# Patient Record
Sex: FEMALE | Race: WHITE | NOT HISPANIC OR LATINO
[De-identification: names, ages, dates, MRNs, and addresses within clinical notes are randomized per-mention and may not be internally consistent; named-entity substitution may affect disease eponyms.]

---

## 2017-06-22 ENCOUNTER — APPOINTMENT (OUTPATIENT)
Dept: OTOLARYNGOLOGY | Facility: CLINIC | Age: 57
End: 2017-06-22

## 2017-06-22 VITALS
DIASTOLIC BLOOD PRESSURE: 93 MMHG | SYSTOLIC BLOOD PRESSURE: 124 MMHG | HEIGHT: 59 IN | BODY MASS INDEX: 24.19 KG/M2 | WEIGHT: 120 LBS | HEART RATE: 91 BPM

## 2017-06-22 DIAGNOSIS — J31.0 CHRONIC RHINITIS: ICD-10-CM

## 2017-06-22 DIAGNOSIS — H90.71 MIXED CONDUCTIVE AND SENSORINEURAL HEARING LOSS, UNILATERAL, RIGHT EAR, WITH UNRESTRICTED HEARING ON THE CONTRALATERAL SIDE: ICD-10-CM

## 2017-06-22 DIAGNOSIS — Z82.2 FAMILY HISTORY OF DEAFNESS AND HEARING LOSS: ICD-10-CM

## 2017-06-22 DIAGNOSIS — J34.2 DEVIATED NASAL SEPTUM: ICD-10-CM

## 2017-06-22 DIAGNOSIS — Z86.39 PERSONAL HISTORY OF OTHER ENDOCRINE, NUTRITIONAL AND METABOLIC DISEASE: ICD-10-CM

## 2017-06-22 DIAGNOSIS — H60.509 UNSPECIFIED ACUTE NONINFECTIVE OTITIS EXTERNA, UNSPECIFIED EAR: ICD-10-CM

## 2017-06-22 DIAGNOSIS — H61.23 IMPACTED CERUMEN, BILATERAL: ICD-10-CM

## 2017-06-22 DIAGNOSIS — H65.31 CHRONIC MUCOID OTITIS MEDIA, RIGHT EAR: ICD-10-CM

## 2017-06-22 RX ORDER — METHYLPREDNISOLONE 4 MG/1
4 TABLET ORAL
Qty: 90 | Refills: 0 | Status: ACTIVE | COMMUNITY
Start: 2017-03-21

## 2017-06-22 RX ORDER — URSODIOL 300 MG/1
300 CAPSULE ORAL
Qty: 180 | Refills: 0 | Status: ACTIVE | COMMUNITY
Start: 2017-01-24

## 2017-06-22 RX ORDER — LAMOTRIGINE 100 MG/1
100 TABLET ORAL
Qty: 90 | Refills: 0 | Status: ACTIVE | COMMUNITY
Start: 2017-05-17

## 2017-06-22 RX ORDER — HYDROCODONE BITARTRATE AND ACETAMINOPHEN 5; 325 MG/1; MG/1
5-325 TABLET ORAL
Qty: 12 | Refills: 0 | Status: ACTIVE | COMMUNITY
Start: 2017-02-06

## 2017-06-22 RX ORDER — METHYLPREDNISOLONE 4 MG/1
4 TABLET ORAL
Qty: 1 | Refills: 0 | Status: ACTIVE | COMMUNITY
Start: 2017-06-22 | End: 1900-01-01

## 2017-06-22 RX ORDER — PANTOPRAZOLE 40 MG/1
40 TABLET, DELAYED RELEASE ORAL
Qty: 30 | Refills: 0 | Status: ACTIVE | COMMUNITY
Start: 2017-05-23

## 2017-06-22 RX ORDER — HYDROXYCHLOROQUINE SULFATE 200 MG/1
200 TABLET, FILM COATED ORAL
Qty: 180 | Refills: 0 | Status: ACTIVE | COMMUNITY
Start: 2017-02-03

## 2017-06-22 RX ORDER — DIAZEPAM 5 MG/1
5 TABLET ORAL
Qty: 10 | Refills: 0 | Status: ACTIVE | COMMUNITY
Start: 2017-02-21

## 2017-06-22 RX ORDER — CHLORHEXIDINE GLUCONATE, 0.12% ORAL RINSE 1.2 MG/ML
0.12 SOLUTION DENTAL
Qty: 480 | Refills: 0 | Status: ACTIVE | COMMUNITY
Start: 2017-02-06

## 2017-06-22 RX ORDER — PREDNISOLONE ACETATE 10 MG/ML
1 SUSPENSION/ DROPS OPHTHALMIC
Qty: 10 | Refills: 0 | Status: ACTIVE | COMMUNITY
Start: 2017-05-17

## 2017-06-22 RX ORDER — NITROGLYCERIN 20 MG/G
2 OINTMENT TOPICAL
Qty: 48 | Refills: 0 | Status: ACTIVE | COMMUNITY
Start: 2017-03-13

## 2017-06-22 RX ORDER — DILTIAZEM HYDROCHLORIDE 120 MG/1
120 CAPSULE, EXTENDED RELEASE ORAL
Qty: 30 | Refills: 0 | Status: ACTIVE | COMMUNITY
Start: 2017-05-08

## 2017-06-22 RX ORDER — PREDNISONE 1 MG/1
1 TABLET ORAL
Qty: 120 | Refills: 0 | Status: ACTIVE | COMMUNITY
Start: 2017-06-15

## 2017-06-22 RX ORDER — METOCLOPRAMIDE 10 MG/1
10 TABLET ORAL
Qty: 30 | Refills: 0 | Status: ACTIVE | COMMUNITY
Start: 2017-05-23

## 2017-06-22 RX ORDER — ACETAMINOPHEN AND CODEINE 300; 30 MG/1; MG/1
300-30 TABLET ORAL
Qty: 16 | Refills: 0 | Status: ACTIVE | COMMUNITY
Start: 2017-03-06

## 2017-06-22 RX ORDER — METHYLPREDNISOLONE 4 MG/1
4 TABLET ORAL
Qty: 21 | Refills: 0 | Status: ACTIVE | COMMUNITY
Start: 2016-12-20

## 2017-06-22 RX ORDER — HYDROMORPHONE HYDROCHLORIDE 4 MG/1
4 TABLET ORAL
Qty: 15 | Refills: 0 | Status: ACTIVE | COMMUNITY
Start: 2017-03-02

## 2017-07-05 ENCOUNTER — APPOINTMENT (OUTPATIENT)
Dept: OTOLARYNGOLOGY | Facility: CLINIC | Age: 57
End: 2017-07-05

## 2017-07-21 ENCOUNTER — APPOINTMENT (OUTPATIENT)
Dept: OTOLARYNGOLOGY | Facility: CLINIC | Age: 57
End: 2017-07-21

## 2017-08-22 ENCOUNTER — APPOINTMENT (OUTPATIENT)
Dept: ORTHOPEDIC SURGERY | Facility: CLINIC | Age: 57
End: 2017-08-22

## 2017-08-31 ENCOUNTER — APPOINTMENT (OUTPATIENT)
Dept: OTOLARYNGOLOGY | Facility: CLINIC | Age: 57
End: 2017-08-31

## 2017-09-11 ENCOUNTER — APPOINTMENT (OUTPATIENT)
Dept: OTOLARYNGOLOGY | Facility: CLINIC | Age: 57
End: 2017-09-11

## 2017-10-02 ENCOUNTER — EMERGENCY (EMERGENCY)
Facility: HOSPITAL | Age: 57
LOS: 1 days | Discharge: HOME | End: 2017-10-02
Attending: EMERGENCY MEDICINE | Admitting: EMERGENCY MEDICINE
Payer: COMMERCIAL

## 2017-10-02 VITALS
HEART RATE: 110 BPM | RESPIRATION RATE: 20 BRPM | OXYGEN SATURATION: 98 % | DIASTOLIC BLOOD PRESSURE: 91 MMHG | SYSTOLIC BLOOD PRESSURE: 142 MMHG | TEMPERATURE: 98 F

## 2017-10-02 DIAGNOSIS — Z90.49 ACQUIRED ABSENCE OF OTHER SPECIFIED PARTS OF DIGESTIVE TRACT: Chronic | ICD-10-CM

## 2017-10-02 DIAGNOSIS — Z98.89 OTHER SPECIFIED POSTPROCEDURAL STATES: Chronic | ICD-10-CM

## 2017-10-02 DIAGNOSIS — Z90.89 ACQUIRED ABSENCE OF OTHER ORGANS: Chronic | ICD-10-CM

## 2017-10-02 PROCEDURE — 73060 X-RAY EXAM OF HUMERUS: CPT | Mod: 26,LT

## 2017-10-02 PROCEDURE — 73090 X-RAY EXAM OF FOREARM: CPT | Mod: 26,LT

## 2017-10-02 PROCEDURE — 73030 X-RAY EXAM OF SHOULDER: CPT | Mod: 26,LT

## 2017-10-02 PROCEDURE — 99284 EMERGENCY DEPT VISIT MOD MDM: CPT | Mod: 25

## 2017-10-02 PROCEDURE — 71010: CPT | Mod: 26

## 2017-10-02 PROCEDURE — 93010 ELECTROCARDIOGRAM REPORT: CPT

## 2017-10-02 PROCEDURE — 73080 X-RAY EXAM OF ELBOW: CPT | Mod: 26,LT

## 2017-10-02 RX ORDER — OXYCODONE AND ACETAMINOPHEN 5; 325 MG/1; MG/1
2 TABLET ORAL ONCE
Qty: 0 | Refills: 0 | Status: DISCONTINUED | OUTPATIENT
Start: 2017-10-02 | End: 2017-10-02

## 2017-10-02 RX ADMIN — OXYCODONE AND ACETAMINOPHEN 2 TABLET(S): 5; 325 TABLET ORAL at 18:11

## 2017-10-02 NOTE — ED PROVIDER NOTE - CARE PLAN
Principal Discharge DX:	Elbow pain  Instructions for follow-up, activity and diet:	Rest, Advance activity as tolerated.  Continue all previously prescribed medications as directed.  Follow up with your primary care physician in 48-72 hours- bring copies of your results.  Follow up with Orthopedics as needed- referral list provided. Return to the Emergency Department for worsening or persistent symptoms OR ANY NEW OR CONCERNING SYMPTOMS.

## 2017-10-02 NOTE — ED PROVIDER NOTE - PLAN OF CARE
Rest, Advance activity as tolerated.  Continue all previously prescribed medications as directed.  Follow up with your primary care physician in 48-72 hours- bring copies of your results.  Follow up with Orthopedics as needed- referral list provided. Return to the Emergency Department for worsening or persistent symptoms OR ANY NEW OR CONCERNING SYMPTOMS.
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
## 2017-10-02 NOTE — ED PROVIDER NOTE - NEUROLOGICAL, MLM
Alert and oriented, no focal deficits, no motor or sensory deficits. Alert and oriented, no focal deficits, no motor or sensory deficits. Cn 2-12 intact. Normal gait. No spinal ttp

## 2017-10-02 NOTE — ED PROVIDER NOTE - PROGRESS NOTE DETAILS
edgar rowell: spoke to othopedics- no acute pathology- rec ROM exercises , sling and if pain continues outpatient f/u int he office. pt agrees with plan, will dc hmoe with pain control , PMD /fu and ortho referral. edgar rowell: spoke to othopedics- no acute pathology- rec ROM exercises , sling and if pain continues outpatient f/u int he office. pt agrees with plan, will dc home with pain control , PMD /fu and ortho referral. edgar rowell: spoke to othopedics who reviewed xray and state no acute fracture- rec ROM exercises , sling and if pain continues outpatient f/u int he office. pt agrees with plan, will dc home with pain control , PMD /fu and ortho referral. Pt has her own orthopedist she will call this week.

## 2017-10-02 NOTE — ED PROVIDER NOTE - UPPER EXTREMITY EXAM, LEFT
tenderness on the left shoulder, humorous and elbow, pain w/ flexion and extension, pain w/ pronation and supination, decreased strength secondary to pain, strong radial pulse b/l, sensation intact b/l, cap refill less than 2 secs tenderness on the left shoulder, humorus and elbow, pain w/ flexion and extension, pain w/ pronation and supination, decreased strength secondary to pain, strong radial pulse b/l, sensation intact b/l, cap refill less than 2 secs

## 2019-01-29 ENCOUNTER — RESULT REVIEW (OUTPATIENT)
Age: 59
End: 2019-01-29

## 2019-05-18 ENCOUNTER — EMERGENCY (EMERGENCY)
Facility: HOSPITAL | Age: 59
LOS: 1 days | Discharge: HOME | End: 2019-05-18
Attending: STUDENT IN AN ORGANIZED HEALTH CARE EDUCATION/TRAINING PROGRAM | Admitting: STUDENT IN AN ORGANIZED HEALTH CARE EDUCATION/TRAINING PROGRAM
Payer: COMMERCIAL

## 2019-05-18 VITALS
SYSTOLIC BLOOD PRESSURE: 134 MMHG | TEMPERATURE: 98 F | OXYGEN SATURATION: 99 % | DIASTOLIC BLOOD PRESSURE: 71 MMHG | RESPIRATION RATE: 16 BRPM | HEART RATE: 72 BPM

## 2019-05-18 VITALS
TEMPERATURE: 99 F | DIASTOLIC BLOOD PRESSURE: 63 MMHG | SYSTOLIC BLOOD PRESSURE: 111 MMHG | RESPIRATION RATE: 18 BRPM | HEART RATE: 82 BPM | OXYGEN SATURATION: 98 %

## 2019-05-18 DIAGNOSIS — Z90.89 ACQUIRED ABSENCE OF OTHER ORGANS: Chronic | ICD-10-CM

## 2019-05-18 DIAGNOSIS — Z90.49 ACQUIRED ABSENCE OF OTHER SPECIFIED PARTS OF DIGESTIVE TRACT: Chronic | ICD-10-CM

## 2019-05-18 DIAGNOSIS — Z98.89 OTHER SPECIFIED POSTPROCEDURAL STATES: Chronic | ICD-10-CM

## 2019-05-18 LAB
ALBUMIN SERPL ELPH-MCNC: 3.9 G/DL — SIGNIFICANT CHANGE UP (ref 3.3–5)
ALP SERPL-CCNC: 79 U/L — SIGNIFICANT CHANGE UP (ref 40–120)
ALT FLD-CCNC: 9 U/L — SIGNIFICANT CHANGE UP (ref 4–33)
ANION GAP SERPL CALC-SCNC: 11 MMO/L — SIGNIFICANT CHANGE UP (ref 7–14)
AST SERPL-CCNC: 13 U/L — SIGNIFICANT CHANGE UP (ref 4–32)
BASE EXCESS BLDV CALC-SCNC: 4.2 MMOL/L — SIGNIFICANT CHANGE UP
BASOPHILS # BLD AUTO: 0.07 K/UL — SIGNIFICANT CHANGE UP (ref 0–0.2)
BASOPHILS NFR BLD AUTO: 0.7 % — SIGNIFICANT CHANGE UP (ref 0–2)
BILIRUB SERPL-MCNC: < 0.2 MG/DL — LOW (ref 0.2–1.2)
BLOOD GAS VENOUS - CREATININE: 0.77 MG/DL — SIGNIFICANT CHANGE UP (ref 0.5–1.3)
BLOOD GAS VENOUS - FIO2: 21 — SIGNIFICANT CHANGE UP
BUN SERPL-MCNC: 11 MG/DL — SIGNIFICANT CHANGE UP (ref 7–23)
CALCIUM SERPL-MCNC: 9 MG/DL — SIGNIFICANT CHANGE UP (ref 8.4–10.5)
CHLORIDE BLDV-SCNC: 105 MMOL/L — SIGNIFICANT CHANGE UP (ref 96–108)
CHLORIDE SERPL-SCNC: 105 MMOL/L — SIGNIFICANT CHANGE UP (ref 98–107)
CO2 SERPL-SCNC: 28 MMOL/L — SIGNIFICANT CHANGE UP (ref 22–31)
CREAT SERPL-MCNC: 0.79 MG/DL — SIGNIFICANT CHANGE UP (ref 0.5–1.3)
EOSINOPHIL # BLD AUTO: 0.63 K/UL — HIGH (ref 0–0.5)
EOSINOPHIL NFR BLD AUTO: 6.3 % — HIGH (ref 0–6)
FLU A RESULT: NOT DETECTED — SIGNIFICANT CHANGE UP
FLU A RESULT: NOT DETECTED — SIGNIFICANT CHANGE UP
FLUAV AG NPH QL: NOT DETECTED — SIGNIFICANT CHANGE UP
FLUBV AG NPH QL: NOT DETECTED — SIGNIFICANT CHANGE UP
GAS PNL BLDV: 139 MMOL/L — SIGNIFICANT CHANGE UP (ref 136–146)
GLUCOSE BLDV-MCNC: 87 MG/DL — SIGNIFICANT CHANGE UP (ref 70–99)
GLUCOSE SERPL-MCNC: 92 MG/DL — SIGNIFICANT CHANGE UP (ref 70–99)
HCO3 BLDV-SCNC: 27 MMOL/L — SIGNIFICANT CHANGE UP (ref 20–27)
HCT VFR BLD CALC: 31.8 % — LOW (ref 34.5–45)
HCT VFR BLDV CALC: 31.1 % — LOW (ref 34.5–45)
HGB BLD-MCNC: 9.8 G/DL — LOW (ref 11.5–15.5)
HGB BLDV-MCNC: 10 G/DL — LOW (ref 11.5–15.5)
IMM GRANULOCYTES NFR BLD AUTO: 0.5 % — SIGNIFICANT CHANGE UP (ref 0–1.5)
LACTATE BLDV-MCNC: 1.6 MMOL/L — SIGNIFICANT CHANGE UP (ref 0.5–2)
LIDOCAIN IGE QN: 17.8 U/L — SIGNIFICANT CHANGE UP (ref 7–60)
LYMPHOCYTES # BLD AUTO: 1.07 K/UL — SIGNIFICANT CHANGE UP (ref 1–3.3)
LYMPHOCYTES # BLD AUTO: 10.7 % — LOW (ref 13–44)
MCHC RBC-ENTMCNC: 24.9 PG — LOW (ref 27–34)
MCHC RBC-ENTMCNC: 30.8 % — LOW (ref 32–36)
MCV RBC AUTO: 80.9 FL — SIGNIFICANT CHANGE UP (ref 80–100)
MONOCYTES # BLD AUTO: 0.89 K/UL — SIGNIFICANT CHANGE UP (ref 0–0.9)
MONOCYTES NFR BLD AUTO: 8.9 % — SIGNIFICANT CHANGE UP (ref 2–14)
NEUTROPHILS # BLD AUTO: 7.27 K/UL — SIGNIFICANT CHANGE UP (ref 1.8–7.4)
NEUTROPHILS NFR BLD AUTO: 72.9 % — SIGNIFICANT CHANGE UP (ref 43–77)
NRBC # FLD: 0 K/UL — SIGNIFICANT CHANGE UP (ref 0–0)
PCO2 BLDV: 56 MMHG — HIGH (ref 41–51)
PH BLDV: 7.35 PH — SIGNIFICANT CHANGE UP (ref 7.32–7.43)
PLATELET # BLD AUTO: 277 K/UL — SIGNIFICANT CHANGE UP (ref 150–400)
PMV BLD: 10 FL — SIGNIFICANT CHANGE UP (ref 7–13)
PO2 BLDV: 31 MMHG — LOW (ref 35–40)
POTASSIUM BLDV-SCNC: 3.7 MMOL/L — SIGNIFICANT CHANGE UP (ref 3.4–4.5)
POTASSIUM SERPL-MCNC: 3.9 MMOL/L — SIGNIFICANT CHANGE UP (ref 3.5–5.3)
POTASSIUM SERPL-SCNC: 3.9 MMOL/L — SIGNIFICANT CHANGE UP (ref 3.5–5.3)
PROT SERPL-MCNC: 6.2 G/DL — SIGNIFICANT CHANGE UP (ref 6–8.3)
RBC # BLD: 3.93 M/UL — SIGNIFICANT CHANGE UP (ref 3.8–5.2)
RBC # FLD: 15.2 % — HIGH (ref 10.3–14.5)
RSV RESULT: SIGNIFICANT CHANGE UP
RSV RNA RESP QL NAA+PROBE: SIGNIFICANT CHANGE UP
SAO2 % BLDV: 52.3 % — LOW (ref 60–85)
SODIUM SERPL-SCNC: 144 MMOL/L — SIGNIFICANT CHANGE UP (ref 135–145)
WBC # BLD: 9.98 K/UL — SIGNIFICANT CHANGE UP (ref 3.8–10.5)
WBC # FLD AUTO: 9.98 K/UL — SIGNIFICANT CHANGE UP (ref 3.8–10.5)

## 2019-05-18 PROCEDURE — 71045 X-RAY EXAM CHEST 1 VIEW: CPT | Mod: 26

## 2019-05-18 PROCEDURE — 74177 CT ABD & PELVIS W/CONTRAST: CPT | Mod: 26

## 2019-05-18 PROCEDURE — 99285 EMERGENCY DEPT VISIT HI MDM: CPT

## 2019-05-18 RX ORDER — SODIUM CHLORIDE 9 MG/ML
1000 INJECTION INTRAMUSCULAR; INTRAVENOUS; SUBCUTANEOUS ONCE
Refills: 0 | Status: COMPLETED | OUTPATIENT
Start: 2019-05-18 | End: 2019-05-18

## 2019-05-18 RX ORDER — SODIUM CHLORIDE 9 MG/ML
1000 INJECTION, SOLUTION INTRAVENOUS ONCE
Refills: 0 | Status: COMPLETED | OUTPATIENT
Start: 2019-05-18 | End: 2019-05-18

## 2019-05-18 RX ORDER — DIPHENHYDRAMINE HCL 50 MG
25 CAPSULE ORAL ONCE
Refills: 0 | Status: COMPLETED | OUTPATIENT
Start: 2019-05-18 | End: 2019-05-18

## 2019-05-18 RX ORDER — HYDROMORPHONE HYDROCHLORIDE 2 MG/ML
1 INJECTION INTRAMUSCULAR; INTRAVENOUS; SUBCUTANEOUS ONCE
Refills: 0 | Status: DISCONTINUED | OUTPATIENT
Start: 2019-05-18 | End: 2019-05-18

## 2019-05-18 RX ADMIN — HYDROMORPHONE HYDROCHLORIDE 1 MILLIGRAM(S): 2 INJECTION INTRAMUSCULAR; INTRAVENOUS; SUBCUTANEOUS at 12:59

## 2019-05-18 RX ADMIN — SODIUM CHLORIDE 1000 MILLILITER(S): 9 INJECTION INTRAMUSCULAR; INTRAVENOUS; SUBCUTANEOUS at 17:52

## 2019-05-18 RX ADMIN — Medication 25 MILLIGRAM(S): at 14:37

## 2019-05-18 RX ADMIN — HYDROMORPHONE HYDROCHLORIDE 1 MILLIGRAM(S): 2 INJECTION INTRAMUSCULAR; INTRAVENOUS; SUBCUTANEOUS at 12:35

## 2019-05-18 RX ADMIN — SODIUM CHLORIDE 1000 MILLILITER(S): 9 INJECTION, SOLUTION INTRAVENOUS at 12:35

## 2019-05-18 NOTE — ED ADULT NURSE REASSESSMENT NOTE - NS ED NURSE REASSESS COMMENT FT1
Pt c/o itchiness to back and right arm.  No rash noted.  MD Bauer aware, will evaluate pt.  Pt medicated as ordered for itching.

## 2019-05-18 NOTE — ED ADULT NURSE NOTE - NSIMPLEMENTINTERV_GEN_ALL_ED
Implemented All Fall Risk Interventions:  [place] to call system. Call bell, personal items and telephone within reach. Instruct patient to call for assistance. Room bathroom lighting operational. Non-slip footwear when patient is off stretcher. Physically safe environment: no spills, clutter or unnecessary equipment. Stretcher in lowest position, wheels locked, appropriate side rails in place. Provide visual cue, wrist band, yellow gown, etc. Monitor gait and stability. Monitor for mental status changes and reorient to person, place, and time. Review medications for side effects contributing to fall risk. Reinforce activity limits and safety measures with patient and family.

## 2019-05-18 NOTE — ED ADULT NURSE NOTE - CHIEF COMPLAINT QUOTE
Notification to r/o measles. Pt. c/o fevers, running red eyes and rash noted to back and legs x 1wk ago. Pt. brought to rm 6 through outside door.

## 2019-05-18 NOTE — ED ADULT NURSE REASSESSMENT NOTE - NS ED NURSE REASSESS COMMENT FT1
Per MD, pt does not have measles; Airborne precautions removed.  Pt placed on Droplet precautions pending RVP results.  Family of pt educated on handwashing and wearing masks; verbalizes understanding.  Droplet sign placed on door of room 6.

## 2019-05-18 NOTE — ED PROVIDER NOTE - OBJECTIVE STATEMENT
58F lupus, sjogren, primary biliary sclerosis, neuropathy, anemia with prior transfusions, pw 2 week fatigue, dizziness, intermittent fevers, vomiting, SOB with exertion, dry cough, and RUQ pain.  Patient does not have rash (contrary to triage note). dizziness and lightheadeness most noticble with position change, Fever 101 max 2-3 days ago. NBNB vomiting, 3-4 times, no diarrhea, no melena or BRBPR. Pt endorse decreased PO intake, denies CP, SOB at baseline, UTI symptoms. 1.5 weeks ago, pt evaluated @[facility] ED and was d.c home with no sig finding.

## 2019-05-18 NOTE — ED PROVIDER NOTE - NS ED ROS FT
GENERAL: + fevers, generalized weakness.   EYES: no vision changes, no discharge.   HEENT: no difficulty swallowing or speaking   CARDIAC: no chest pain/pressure, SOB, lower ex edema  PULMONARY: +cough, +SOB  GI: + abdominal pain, +n, V, no d/c  : no dysuria, frequency, hematuria  SKIN: no rashes, lesions, vesicles  NEURO: no headache, +lightheadedness, +paraesthesias.   MSK: No joint pain, myalgia, weakness.

## 2019-05-18 NOTE — ED PROVIDER NOTE - NSFOLLOWUPINSTRUCTIONS_ED_ALL_ED_FT
You were seen in the Emergency Department (ED) for generalized malaise. Your CT scan was negative for life threatening conditions.     Please take over the counter Tylenol 650mg every 4 hours.   Please follow up with your primary care doctor in the next 72 hours.    Please return to the ED if you experience any new or concerning symptoms, such as: worsening pain, chest pain, difficulty breathing, passing out, unable to eat or drink, unable to move or feel part of your body, fever, chills.     Thank you for choosing a [facility] ED.

## 2019-05-18 NOTE — ED PROVIDER NOTE - ATTENDING CONTRIBUTION TO CARE
59 yo female with PMH lupus, sjogren presents to ED for evaluation of 2 week h/o generalized weakness and fatigue, dizziness, fevers, cough, abd pain, nausea, sore throat. Patient denies any sick contacts. Denies chest pain, shortness of breath. States her symptoms have not been improved. Denies known measles exposure - states "if you think me visiting [place] is an exposure, that's my only exposure."  s/p appendectomy and cholecystectomy    Gen: tired appearing, awake and oriented x 3  Cardiac: regular rate and rhythm, +S1S2  HEENT: normocephalic, atraumatic, PERRL, EOMI, injected conjunctiva, no rhinorrhea, oropharynx mildly erythematous, no tonsillar swelling or exudates noted, no ant cervical lymphadenopathy  Pulm: Clear to auscultation bilaterally  Abd: soft, +ttp right side abd, nondistended, no guarding  Back: neg CVA ttp, nontender spine  Neuro: awake, alert, oriented x 3, sensorimotor intact    MDM  Female presents to ED for evaluation of multiple generalized symptoms, will check labs, imaging, medicate, reassess

## 2019-05-18 NOTE — ED PROVIDER NOTE - CLINICAL SUMMARY MEDICAL DECISION MAKING FREE TEXT BOX
58F lupus, sjogren, primary biliary sclerosis, anemia with prior transfusions, pw 2 week fatigue, dizziness, intermittent fevers, vomiting, SOB with exertion, dry cough, and RUQ pain. ddx: anemia, viral syndrome, URI, gastritis, pancreatits, hepatitis. CT abdomen and pelvis for RUQ tenderness, labs, CXR, fluids, pain control. Reassess for dispo.

## 2019-05-18 NOTE — ED PROVIDER NOTE - PHYSICAL EXAMINATION
General: Patient awake alert NAD.   HEENT: normocephalic, atraumatic, EMOI, no scleral icterus.   Cardiac: RRR, S1, S2, no murmur, rubs, gallop.   LUNGS: CTA B/L no wheeze, rhonchi, rales.   Abdomen: soft + RUQ TTP, no rebound no guarding, no CVA tenderness.   EXT: Moving all extremities, no edema.    Neuro: A&Ox3, gait normal, no focal neurological deficits, CN 2-12 grossly intact  Skin: warm, dry, no rash, no lesions

## 2019-05-19 ENCOUNTER — INPATIENT (INPATIENT)
Facility: HOSPITAL | Age: 59
LOS: 0 days | Discharge: HOME | End: 2019-05-20
Attending: INTERNAL MEDICINE | Admitting: INTERNAL MEDICINE
Payer: COMMERCIAL

## 2019-05-19 VITALS
RESPIRATION RATE: 18 BRPM | SYSTOLIC BLOOD PRESSURE: 126 MMHG | OXYGEN SATURATION: 99 % | DIASTOLIC BLOOD PRESSURE: 82 MMHG | HEART RATE: 107 BPM | TEMPERATURE: 98 F

## 2019-05-19 DIAGNOSIS — Z90.89 ACQUIRED ABSENCE OF OTHER ORGANS: Chronic | ICD-10-CM

## 2019-05-19 DIAGNOSIS — K74.3 PRIMARY BILIARY CIRRHOSIS: ICD-10-CM

## 2019-05-19 DIAGNOSIS — Z90.49 ACQUIRED ABSENCE OF OTHER SPECIFIED PARTS OF DIGESTIVE TRACT: Chronic | ICD-10-CM

## 2019-05-19 DIAGNOSIS — J45.20 MILD INTERMITTENT ASTHMA, UNCOMPLICATED: ICD-10-CM

## 2019-05-19 DIAGNOSIS — L29.9 PRURITUS, UNSPECIFIED: ICD-10-CM

## 2019-05-19 DIAGNOSIS — Z29.9 ENCOUNTER FOR PROPHYLACTIC MEASURES, UNSPECIFIED: ICD-10-CM

## 2019-05-19 DIAGNOSIS — G89.29 OTHER CHRONIC PAIN: ICD-10-CM

## 2019-05-19 DIAGNOSIS — N30.00 ACUTE CYSTITIS WITHOUT HEMATURIA: ICD-10-CM

## 2019-05-19 DIAGNOSIS — M35.00 SJOGREN SYNDROME, UNSPECIFIED: ICD-10-CM

## 2019-05-19 DIAGNOSIS — I73.00 RAYNAUD'S SYNDROME WITHOUT GANGRENE: ICD-10-CM

## 2019-05-19 DIAGNOSIS — Z98.89 OTHER SPECIFIED POSTPROCEDURAL STATES: Chronic | ICD-10-CM

## 2019-05-19 LAB
ALBUMIN SERPL ELPH-MCNC: 4.4 G/DL — SIGNIFICANT CHANGE UP (ref 3.3–5)
ALP SERPL-CCNC: 91 U/L — SIGNIFICANT CHANGE UP (ref 40–120)
ALT FLD-CCNC: 12 U/L — SIGNIFICANT CHANGE UP (ref 4–33)
ANION GAP SERPL CALC-SCNC: 14 MMO/L — SIGNIFICANT CHANGE UP (ref 7–14)
APPEARANCE UR: SIGNIFICANT CHANGE UP
APTT BLD: 26.5 SEC — LOW (ref 27.5–36.3)
AST SERPL-CCNC: 19 U/L — SIGNIFICANT CHANGE UP (ref 4–32)
B PERT DNA SPEC QL NAA+PROBE: NOT DETECTED — SIGNIFICANT CHANGE UP
BACTERIA # UR AUTO: SIGNIFICANT CHANGE UP
BASE EXCESS BLDV CALC-SCNC: 2 MMOL/L — SIGNIFICANT CHANGE UP
BASOPHILS # BLD AUTO: 0.07 K/UL — SIGNIFICANT CHANGE UP (ref 0–0.2)
BASOPHILS NFR BLD AUTO: 0.8 % — SIGNIFICANT CHANGE UP (ref 0–2)
BILIRUB SERPL-MCNC: < 0.2 MG/DL — LOW (ref 0.2–1.2)
BILIRUB UR-MCNC: NEGATIVE — SIGNIFICANT CHANGE UP
BLOOD GAS VENOUS - CREATININE: 0.7 MG/DL — SIGNIFICANT CHANGE UP (ref 0.5–1.3)
BLOOD GAS VENOUS - FIO2: 21 — SIGNIFICANT CHANGE UP
BLOOD UR QL VISUAL: NEGATIVE — SIGNIFICANT CHANGE UP
BUN SERPL-MCNC: 8 MG/DL — SIGNIFICANT CHANGE UP (ref 7–23)
C PNEUM DNA SPEC QL NAA+PROBE: NOT DETECTED — SIGNIFICANT CHANGE UP
CALCIUM SERPL-MCNC: 9.7 MG/DL — SIGNIFICANT CHANGE UP (ref 8.4–10.5)
CHLORIDE BLDV-SCNC: 105 MMOL/L — SIGNIFICANT CHANGE UP (ref 96–108)
CHLORIDE SERPL-SCNC: 102 MMOL/L — SIGNIFICANT CHANGE UP (ref 98–107)
CO2 SERPL-SCNC: 24 MMOL/L — SIGNIFICANT CHANGE UP (ref 22–31)
COLOR SPEC: SIGNIFICANT CHANGE UP
CREAT SERPL-MCNC: 0.74 MG/DL — SIGNIFICANT CHANGE UP (ref 0.5–1.3)
EOSINOPHIL # BLD AUTO: 0.73 K/UL — HIGH (ref 0–0.5)
EOSINOPHIL NFR BLD AUTO: 8.4 % — HIGH (ref 0–6)
FLUAV H1 2009 PAND RNA SPEC QL NAA+PROBE: NOT DETECTED — SIGNIFICANT CHANGE UP
FLUAV H1 RNA SPEC QL NAA+PROBE: NOT DETECTED — SIGNIFICANT CHANGE UP
FLUAV H3 RNA SPEC QL NAA+PROBE: NOT DETECTED — SIGNIFICANT CHANGE UP
FLUAV SUBTYP SPEC NAA+PROBE: NOT DETECTED — SIGNIFICANT CHANGE UP
FLUBV RNA SPEC QL NAA+PROBE: NOT DETECTED — SIGNIFICANT CHANGE UP
GAS PNL BLDV: 137 MMOL/L — SIGNIFICANT CHANGE UP (ref 136–146)
GLUCOSE BLDV-MCNC: 89 MG/DL — SIGNIFICANT CHANGE UP (ref 70–99)
GLUCOSE SERPL-MCNC: 86 MG/DL — SIGNIFICANT CHANGE UP (ref 70–99)
GLUCOSE UR-MCNC: NEGATIVE — SIGNIFICANT CHANGE UP
HADV DNA SPEC QL NAA+PROBE: NOT DETECTED — SIGNIFICANT CHANGE UP
HCO3 BLDV-SCNC: 26 MMOL/L — SIGNIFICANT CHANGE UP (ref 20–27)
HCOV PNL SPEC NAA+PROBE: SIGNIFICANT CHANGE UP
HCT VFR BLD CALC: 35.6 % — SIGNIFICANT CHANGE UP (ref 34.5–45)
HCT VFR BLDV CALC: 31.4 % — LOW (ref 34.5–45)
HGB BLD-MCNC: 10.9 G/DL — LOW (ref 11.5–15.5)
HGB BLDV-MCNC: 10.2 G/DL — LOW (ref 11.5–15.5)
HMPV RNA SPEC QL NAA+PROBE: NOT DETECTED — SIGNIFICANT CHANGE UP
HPIV1 RNA SPEC QL NAA+PROBE: NOT DETECTED — SIGNIFICANT CHANGE UP
HPIV2 RNA SPEC QL NAA+PROBE: NOT DETECTED — SIGNIFICANT CHANGE UP
HPIV3 RNA SPEC QL NAA+PROBE: NOT DETECTED — SIGNIFICANT CHANGE UP
HPIV4 RNA SPEC QL NAA+PROBE: NOT DETECTED — SIGNIFICANT CHANGE UP
HYALINE CASTS # UR AUTO: HIGH
IMM GRANULOCYTES NFR BLD AUTO: 0.6 % — SIGNIFICANT CHANGE UP (ref 0–1.5)
INR BLD: 0.88 — SIGNIFICANT CHANGE UP (ref 0.88–1.17)
KETONES UR-MCNC: NEGATIVE — SIGNIFICANT CHANGE UP
LACTATE BLDV-MCNC: 1.8 MMOL/L — SIGNIFICANT CHANGE UP (ref 0.5–2)
LEUKOCYTE ESTERASE UR-ACNC: SIGNIFICANT CHANGE UP
LYMPHOCYTES # BLD AUTO: 1.28 K/UL — SIGNIFICANT CHANGE UP (ref 1–3.3)
LYMPHOCYTES # BLD AUTO: 14.8 % — SIGNIFICANT CHANGE UP (ref 13–44)
MCHC RBC-ENTMCNC: 24.7 PG — LOW (ref 27–34)
MCHC RBC-ENTMCNC: 30.6 % — LOW (ref 32–36)
MCV RBC AUTO: 80.7 FL — SIGNIFICANT CHANGE UP (ref 80–100)
MONOCYTES # BLD AUTO: 0.9 K/UL — SIGNIFICANT CHANGE UP (ref 0–0.9)
MONOCYTES NFR BLD AUTO: 10.4 % — SIGNIFICANT CHANGE UP (ref 2–14)
NEUTROPHILS # BLD AUTO: 5.64 K/UL — SIGNIFICANT CHANGE UP (ref 1.8–7.4)
NEUTROPHILS NFR BLD AUTO: 65 % — SIGNIFICANT CHANGE UP (ref 43–77)
NITRITE UR-MCNC: NEGATIVE — SIGNIFICANT CHANGE UP
NRBC # FLD: 0 K/UL — SIGNIFICANT CHANGE UP (ref 0–0)
PCO2 BLDV: 42 MMHG — SIGNIFICANT CHANGE UP (ref 41–51)
PH BLDV: 7.41 PH — SIGNIFICANT CHANGE UP (ref 7.32–7.43)
PH UR: 7 — SIGNIFICANT CHANGE UP (ref 5–8)
PLATELET # BLD AUTO: 298 K/UL — SIGNIFICANT CHANGE UP (ref 150–400)
PMV BLD: 10.6 FL — SIGNIFICANT CHANGE UP (ref 7–13)
PO2 BLDV: 48 MMHG — HIGH (ref 35–40)
POTASSIUM BLDV-SCNC: 3.4 MMOL/L — SIGNIFICANT CHANGE UP (ref 3.4–4.5)
POTASSIUM SERPL-MCNC: 3.7 MMOL/L — SIGNIFICANT CHANGE UP (ref 3.5–5.3)
POTASSIUM SERPL-SCNC: 3.7 MMOL/L — SIGNIFICANT CHANGE UP (ref 3.5–5.3)
PROT SERPL-MCNC: 6.9 G/DL — SIGNIFICANT CHANGE UP (ref 6–8.3)
PROT UR-MCNC: 30 — SIGNIFICANT CHANGE UP
PROTHROM AB SERPL-ACNC: 10 SEC — SIGNIFICANT CHANGE UP (ref 9.8–13.1)
RBC # BLD: 4.41 M/UL — SIGNIFICANT CHANGE UP (ref 3.8–5.2)
RBC # FLD: 15.2 % — HIGH (ref 10.3–14.5)
RBC CASTS # UR COMP ASSIST: HIGH (ref 0–?)
RSV RNA SPEC QL NAA+PROBE: NOT DETECTED — SIGNIFICANT CHANGE UP
RV+EV RNA SPEC QL NAA+PROBE: NOT DETECTED — SIGNIFICANT CHANGE UP
SAO2 % BLDV: 83.9 % — SIGNIFICANT CHANGE UP (ref 60–85)
SODIUM SERPL-SCNC: 140 MMOL/L — SIGNIFICANT CHANGE UP (ref 135–145)
SP GR SPEC: 1.02 — SIGNIFICANT CHANGE UP (ref 1–1.04)
SQUAMOUS # UR AUTO: SIGNIFICANT CHANGE UP
TROPONIN T, HIGH SENSITIVITY: < 6 NG/L — SIGNIFICANT CHANGE UP (ref ?–14)
TSH SERPL-MCNC: 5.26 UIU/ML — HIGH (ref 0.27–4.2)
UROBILINOGEN FLD QL: NORMAL — SIGNIFICANT CHANGE UP
WBC # BLD: 8.67 K/UL — SIGNIFICANT CHANGE UP (ref 3.8–10.5)
WBC # FLD AUTO: 8.67 K/UL — SIGNIFICANT CHANGE UP (ref 3.8–10.5)
WBC UR QL: >50 — HIGH (ref 0–?)

## 2019-05-19 PROCEDURE — 99223 1ST HOSP IP/OBS HIGH 75: CPT

## 2019-05-19 PROCEDURE — 70450 CT HEAD/BRAIN W/O DYE: CPT | Mod: 26

## 2019-05-19 RX ORDER — LEVOTHYROXINE SODIUM 125 MCG
75 TABLET ORAL DAILY
Refills: 0 | Status: DISCONTINUED | OUTPATIENT
Start: 2019-05-19 | End: 2019-05-20

## 2019-05-19 RX ORDER — DILTIAZEM HCL 120 MG
120 CAPSULE, EXT RELEASE 24 HR ORAL DAILY
Refills: 0 | Status: DISCONTINUED | OUTPATIENT
Start: 2019-05-19 | End: 2019-05-20

## 2019-05-19 RX ORDER — CEFTRIAXONE 500 MG/1
1 INJECTION, POWDER, FOR SOLUTION INTRAMUSCULAR; INTRAVENOUS ONCE
Refills: 0 | Status: COMPLETED | OUTPATIENT
Start: 2019-05-19 | End: 2019-05-19

## 2019-05-19 RX ORDER — HYDROMORPHONE HYDROCHLORIDE 2 MG/ML
1 INJECTION INTRAMUSCULAR; INTRAVENOUS; SUBCUTANEOUS ONCE
Refills: 0 | Status: DISCONTINUED | OUTPATIENT
Start: 2019-05-19 | End: 2019-05-19

## 2019-05-19 RX ORDER — SALIVA SUBSTITUTE COMB NO.11 351 MG
5 POWDER IN PACKET (EA) MUCOUS MEMBRANE
Refills: 0 | Status: DISCONTINUED | OUTPATIENT
Start: 2019-05-19 | End: 2019-05-20

## 2019-05-19 RX ORDER — URSODIOL 250 MG/1
300 TABLET, FILM COATED ORAL
Refills: 0 | Status: DISCONTINUED | OUTPATIENT
Start: 2019-05-19 | End: 2019-05-20

## 2019-05-19 RX ORDER — LAMOTRIGINE 25 MG/1
175 TABLET, ORALLY DISINTEGRATING ORAL DAILY
Refills: 0 | Status: DISCONTINUED | OUTPATIENT
Start: 2019-05-19 | End: 2019-05-20

## 2019-05-19 RX ORDER — SODIUM CHLORIDE 9 MG/ML
1000 INJECTION INTRAMUSCULAR; INTRAVENOUS; SUBCUTANEOUS ONCE
Refills: 0 | Status: COMPLETED | OUTPATIENT
Start: 2019-05-19 | End: 2019-05-19

## 2019-05-19 RX ORDER — DIPHENHYDRAMINE HCL 50 MG
25 CAPSULE ORAL EVERY 6 HOURS
Refills: 0 | Status: DISCONTINUED | OUTPATIENT
Start: 2019-05-19 | End: 2019-05-20

## 2019-05-19 RX ORDER — IPRATROPIUM/ALBUTEROL SULFATE 18-103MCG
3 AEROSOL WITH ADAPTER (GRAM) INHALATION ONCE
Refills: 0 | Status: COMPLETED | OUTPATIENT
Start: 2019-05-19 | End: 2019-05-19

## 2019-05-19 RX ORDER — ENOXAPARIN SODIUM 100 MG/ML
40 INJECTION SUBCUTANEOUS EVERY 24 HOURS
Refills: 0 | Status: DISCONTINUED | OUTPATIENT
Start: 2019-05-19 | End: 2019-05-20

## 2019-05-19 RX ORDER — ACETAMINOPHEN 500 MG
650 TABLET ORAL ONCE
Refills: 0 | Status: COMPLETED | OUTPATIENT
Start: 2019-05-19 | End: 2019-05-19

## 2019-05-19 RX ORDER — HYDROMORPHONE HYDROCHLORIDE 2 MG/ML
4 INJECTION INTRAMUSCULAR; INTRAVENOUS; SUBCUTANEOUS EVERY 8 HOURS
Refills: 0 | Status: DISCONTINUED | OUTPATIENT
Start: 2019-05-19 | End: 2019-05-20

## 2019-05-19 RX ORDER — HYDROXYCHLOROQUINE SULFATE 200 MG
200 TABLET ORAL
Refills: 0 | Status: DISCONTINUED | OUTPATIENT
Start: 2019-05-19 | End: 2019-05-20

## 2019-05-19 RX ORDER — CEFTRIAXONE 500 MG/1
1 INJECTION, POWDER, FOR SOLUTION INTRAMUSCULAR; INTRAVENOUS EVERY 24 HOURS
Refills: 0 | Status: DISCONTINUED | OUTPATIENT
Start: 2019-05-20 | End: 2019-05-20

## 2019-05-19 RX ORDER — DIPHENHYDRAMINE HCL 50 MG
25 CAPSULE ORAL ONCE
Refills: 0 | Status: COMPLETED | OUTPATIENT
Start: 2019-05-19 | End: 2019-05-19

## 2019-05-19 RX ORDER — LANOLIN ALCOHOL/MO/W.PET/CERES
6 CREAM (GRAM) TOPICAL AT BEDTIME
Refills: 0 | Status: DISCONTINUED | OUTPATIENT
Start: 2019-05-19 | End: 2019-05-20

## 2019-05-19 RX ORDER — DILTIAZEM HCL 120 MG
1 CAPSULE, EXT RELEASE 24 HR ORAL
Qty: 0 | Refills: 0 | DISCHARGE

## 2019-05-19 RX ORDER — SODIUM CHLORIDE 9 MG/ML
1000 INJECTION INTRAMUSCULAR; INTRAVENOUS; SUBCUTANEOUS
Refills: 0 | Status: DISCONTINUED | OUTPATIENT
Start: 2019-05-19 | End: 2019-05-20

## 2019-05-19 RX ADMIN — SODIUM CHLORIDE 75 MILLILITER(S): 9 INJECTION INTRAMUSCULAR; INTRAVENOUS; SUBCUTANEOUS at 19:03

## 2019-05-19 RX ADMIN — Medication 3 MILLILITER(S): at 12:08

## 2019-05-19 RX ADMIN — SODIUM CHLORIDE 1000 MILLILITER(S): 9 INJECTION INTRAMUSCULAR; INTRAVENOUS; SUBCUTANEOUS at 08:05

## 2019-05-19 RX ADMIN — Medication 25 MILLIGRAM(S): at 19:03

## 2019-05-19 RX ADMIN — Medication 3 MILLILITER(S): at 15:52

## 2019-05-19 RX ADMIN — HYDROMORPHONE HYDROCHLORIDE 1 MILLIGRAM(S): 2 INJECTION INTRAMUSCULAR; INTRAVENOUS; SUBCUTANEOUS at 16:04

## 2019-05-19 RX ADMIN — HYDROMORPHONE HYDROCHLORIDE 1 MILLIGRAM(S): 2 INJECTION INTRAMUSCULAR; INTRAVENOUS; SUBCUTANEOUS at 08:05

## 2019-05-19 RX ADMIN — HYDROMORPHONE HYDROCHLORIDE 4 MILLIGRAM(S): 2 INJECTION INTRAMUSCULAR; INTRAVENOUS; SUBCUTANEOUS at 22:15

## 2019-05-19 RX ADMIN — HYDROMORPHONE HYDROCHLORIDE 1 MILLIGRAM(S): 2 INJECTION INTRAMUSCULAR; INTRAVENOUS; SUBCUTANEOUS at 08:20

## 2019-05-19 RX ADMIN — Medication 6 MILLIGRAM(S): at 22:13

## 2019-05-19 RX ADMIN — SODIUM CHLORIDE 1000 MILLILITER(S): 9 INJECTION INTRAMUSCULAR; INTRAVENOUS; SUBCUTANEOUS at 13:00

## 2019-05-19 RX ADMIN — HYDROMORPHONE HYDROCHLORIDE 1 MILLIGRAM(S): 2 INJECTION INTRAMUSCULAR; INTRAVENOUS; SUBCUTANEOUS at 19:03

## 2019-05-19 RX ADMIN — HYDROMORPHONE HYDROCHLORIDE 1 MILLIGRAM(S): 2 INJECTION INTRAMUSCULAR; INTRAVENOUS; SUBCUTANEOUS at 15:52

## 2019-05-19 RX ADMIN — CEFTRIAXONE 100 GRAM(S): 500 INJECTION, POWDER, FOR SOLUTION INTRAMUSCULAR; INTRAVENOUS at 15:53

## 2019-05-19 RX ADMIN — Medication 75 MICROGRAM(S): at 19:03

## 2019-05-19 RX ADMIN — Medication 650 MILLIGRAM(S): at 09:34

## 2019-05-19 RX ADMIN — Medication 120 MILLIGRAM(S): at 19:03

## 2019-05-19 RX ADMIN — Medication 25 MILLIGRAM(S): at 08:05

## 2019-05-19 NOTE — H&P ADULT - NSICDXPASTMEDICALHX_GEN_ALL_CORE_FT
PAST MEDICAL HISTORY:  Asthma     Primary biliary cirrhosis     Sjogren's syndrome PAST MEDICAL HISTORY:  Asthma     Autoimmune skin disease     Hypothyroid     Neuropathy     Primary biliary cirrhosis     Sjogren's syndrome

## 2019-05-19 NOTE — H&P ADULT - PROBLEM SELECTOR PLAN 1
UA+, +CVAT on exam possible pyelo. CT from 5/18 w/o abscess or collections.   c/w IVF  c/w Ceftriaxone (h/o pcn allergy unknown. tolerated Ceftriaxone in ED)  f/u cultures and adjust abx

## 2019-05-19 NOTE — ED ADULT NURSE NOTE - ED STAT RN HANDOFF DETAILS
Patient is A&Ox4, aware of plan of care, in NAD, and has room available.  Report given to nurse on floor via phone.  Patient awaiting transportation.  Will continue to monitor patient closely. FRIEDA Green R.N.

## 2019-05-19 NOTE — ED PROVIDER NOTE - ATTENDING CONTRIBUTION TO CARE
58 F hx lupus, sjogren, PBS (seen in Emergency Department yesterday for 2wk generalized weakness and fatigue, dizziness, fevers, cough, abd pain, nausea, sore throat, itching now p/w same.    Gen: nontoxic, mildly uncomfortable.    HEENT: dry mucous membranes   Head: NC/AT  Neck: trachea midline  Resp:  No distress  Ext: no deformities  Neuro:  A&O appears non focal  Skin:  Warm and dry as visualized  Psych:  Normal affect and mood     MDM  bounceback for same multiple generalized symptoms, possible progression of mult comborbidities but now failing outpt management., will check labs, imaging, medicate, admit.  Pt & family @bedside agree to this plan. 58 F hx lupus, sjogren, PBS (seen in Emergency Department yesterday for 2wk generalized weakness and fatigue, dizziness, fevers, cough, abd pain, nausea, sore throat, itching now p/w same.    Gen: nontoxic, mildly uncomfortable.    HEENT: dry mucous membranes   Head: NC/AT  Neck: trachea midline  Resp:  No distress  Ext: no deformities  Neuro:  A&O appears non focal  Skin:  Warm and dry as visualized. no overt jaundice  Psych:  blunted    MDM  bounceback for same multiple generalized symptoms, possible progression of mult comorbidities but now failing outpt management., will check labs, imaging, medicate, admit.  Pt & family @bedside agree to this plan.

## 2019-05-19 NOTE — H&P ADULT - NSHPREVIEWOFSYSTEMS_GEN_ALL_CORE
CONSTITUTIONAL: +fever; +chills; No night sweats; No weight loss; +fatigue  EYES: No eye pain; No blurry vision  ENMT:  No difficulty hearing; No sinus or throat pain  NECK: No pain or stiffness  RESPIRATORY: No cough; No wheezing; No hemoptysis; No shortness of breath; No sputum production  CARDIOVASCULAR: No chest pain; No palpitations; No leg swelling  GASTROINTESTINAL: +abdominal pain; +nausea; No vomiting; No hematemesis; No diarrhea; No constipation. No melena or hematochezia.  GENITOURINARY: No dysuria; No frequency; No hematuria; No incontinence  NEUROLOGICAL: No headaches; No loss of strength; +numbness  SKIN: No itching/burning; No rashes or lesions   MUSCULOSKELETAL: No joint pain or swelling; No muscle, back, or extremity pain  HEME/LYMPH: No easy bruising, or bleeding gums

## 2019-05-19 NOTE — ED ADULT NURSE NOTE - NSIMPLEMENTINTERV_GEN_ALL_ED
Implemented All Universal Safety Interventions:  [place] to call system. Call bell, personal items and telephone within reach. Instruct patient to call for assistance. Room bathroom lighting operational. Non-slip footwear when patient is off stretcher. Physically safe environment: no spills, clutter or unnecessary equipment. Stretcher in lowest position, wheels locked, appropriate side rails in place. Implemented All Fall Risk Interventions:  [place] to call system. Call bell, personal items and telephone within reach. Instruct patient to call for assistance. Room bathroom lighting operational. Non-slip footwear when patient is off stretcher. Physically safe environment: no spills, clutter or unnecessary equipment. Stretcher in lowest position, wheels locked, appropriate side rails in place. Provide visual cue, wrist band, yellow gown, etc. Monitor gait and stability. Monitor for mental status changes and reorient to person, place, and time. Review medications for side effects contributing to fall risk. Reinforce activity limits and safety measures with patient and family.

## 2019-05-19 NOTE — H&P ADULT - ASSESSMENT
58F with Sjogren's Raynaud's, PBS, presented with metabolic encephalopathy secondary to UTI and clinical pyelonephritis.

## 2019-05-19 NOTE — ED PROVIDER NOTE - CLINICAL SUMMARY MEDICAL DECISION MAKING FREE TEXT BOX
izzy for same multiple generalized symptoms, possible progression of mult comborbidities but now failing outpt management., will check labs, imaging, medicate, admit.  Pt & family @bedside agree to this plan.

## 2019-05-19 NOTE — ED ADULT NURSE NOTE - OBJECTIVE STATEMENT
received pt to room 22 aox3 in no apparent distress. Pt discharged home from ED yesterday after complaints of itchiness, SOB, chest tightness, nausea, fatigue SOB, cough. Pt states when left yesterday felt better but coming back because symptoms returned.  Pt noted with EKG changes in tirage. Friend at bedside pt has not been acting herself during symptoms, states not being as interactive as usual, pt alert and oriented at this time but poor historian. Pt endorses dry cough endorses painful swallowing but no difficulty. Pt denies any nausea, vomiting, diarrhea, fever chills since discharge. Pt states itchiness is all over body. Sclera of eyes appear red. Breaths equal and unlabored, VSS, superficial scratches noted to upper and lower extremities from pt scratching . NSR on cardiac monitor afebrile, 18 G IV R AC placed labs sent, pt endorsed to primary JAGDEEP Fishman

## 2019-05-19 NOTE — H&P ADULT - NSHPPHYSICALEXAM_GEN_ALL_CORE
T(C): 36.8 (05-19-19 @ 17:18), Max: 37.1 (05-19-19 @ 07:20)  HR: 92 (05-19-19 @ 17:18) (92 - 107)  BP: 138/76 (05-19-19 @ 17:18) (117/67 - 138/76)  RR: 18 (05-19-19 @ 17:18) (17 - 18)  SpO2: 97% (05-19-19 @ 17:18) (97% - 100%)    GENERAL: no apparent distress, on room air  HEAD:  Atraumatic, Normocephalic  EYES: EOMI, PERRLA, conjunctiva and sclera clear b/l  NECK: No cervical lymphadenopathy; no obvious masses.   CHEST/LUNG: Clear to auscultation bilaterally; No wheezing or crackles  HEART: Regular rate and rhythm; No obvious murmurs; nl S1/S2; No peripheral edema  ABDOMEN: Soft, Nondistended; Bowel sounds present; TTP over R abdomen. +R CVAT.  EXTREMITIES:  2+ Peripheral Pulses; No joint swelling.  PSYCH: normal affect, calm demeanor  NEUROLOGY: Awake and alert; CN 2-12 grossly intact; no obvious FND  SKIN: No rashes or lesions

## 2019-05-19 NOTE — H&P ADULT - NSICDXPASTSURGICALHX_GEN_ALL_CORE_FT
PAST SURGICAL HISTORY:  S/P appendectomy     S/P cholecystectomy     S/P knee surgery     S/P tonsillectomy

## 2019-05-19 NOTE — ED ADULT TRIAGE NOTE - CHIEF COMPLAINT QUOTE
Pt KATHERIEN Wall from home, reports Shortness of breath, Itchiness all over her body, Weakness, Abdominal pain, dry cough Fever Tmax 101F at home, Seen here yesterday for same complaints and D/C and claimed symptoms worsened given Benadryl Dilaudid. Pt KATHERINE Wall from home, reports Shortness of breath, Itchiness all over her body, Weakness, Abdominal pain, dry cough Fever Tmax 101F at home, Seen here yesterday for same complaints and D/C and claimed symptoms worsened given Benadryl Dilaudid. Addendum,0650hrs, EKG seen by  Red Attending. with changes

## 2019-05-19 NOTE — H&P ADULT - NSHPLABSRESULTS_GEN_ALL_CORE
CAPILLARY BLOOD GLUCOSE                              10.9   8.67  )-----------( 298      ( 19 May 2019 07:15 )             35.6         140  |  102  |  8   ----------------------------<  86  3.7   |  24  |  0.74    Ca    9.7      19 May 2019 07:15    TPro  6.9  /  Alb  4.4  /  TBili  < 0.2<L>  /  DBili  x   /  AST  19  /  ALT  12  /  AlkPhos  91      PT/INR - ( 19 May 2019 07:15 )   PT: 10.0 SEC;   INR: 0.88          PTT - ( 19 May 2019 07:15 )  PTT:26.5 SEC      Urinalysis Basic - ( 19 May 2019 12:30 )    Color: LIGHT YELLOW / Appearance: Lt TURBID / S.023 / pH: 7.0  Gluc: NEGATIVE / Ketone: NEGATIVE  / Bili: NEGATIVE / Urobili: NORMAL   Blood: NEGATIVE / Protein: 30 / Nitrite: NEGATIVE   Leuk Esterase: LARGE / RBC: 11-25 / WBC >50   Sq Epi: FEW / Non Sq Epi: x / Bacteria: 1+    < from: CT Abdomen and Pelvis w/ IV Cont (19 @ 16:35) >    FINDINGS:    LOWER CHEST: The lung bases are clear. The visualized heart is normal in   size.    LIVER: Hepatic steatosis.  BILE DUCTS: Mild intrahepatic and extrahepatic biliary ductal dilatation.  GALLBLADDER: Cholecystectomy.  SPLEEN: The hypodense lesion in the spleen measures 3.9 cm and contains   peripheral calcifications.  PANCREAS: Within normal limits.  ADRENALS: Within normal limits.  KIDNEYS/URETERS: No hydronephrosis. Symmetrical bilateral enhancement.    BLADDER: Within normal limits.  REPRODUCTIVE ORGANS: Status post pessary placement. The adnexa are   unremarkable. Within the uterus is a subtle 5 mm hypervascular region   (series 2 image 84; series 6 on image 70). The remainder the uterus is   unremarkable.    BOWEL: The stomach is unremarkable. The small bowel is normal in caliber.   The large bowel is normal in caliber. No bowel obstruction. The appendix   is not visualized.  PERITONEUM: No ascites.  VESSELS:  Aorta is normal in caliber. The major branches of the aorta are   proximally patent. The portal vein and hepatic veins are patent.  RETROPERITONEUM: No lymphadenopathy.    ABDOMINAL WALL: Within normal limits.  BONES: Within normal limits.    IMPRESSION:     1.  No bowel obstruction.  2.  Subtle hypervascular lesion within the endometrial cavity. Ultrasound   is recommended after the acute symptoms resolve.    < end of copied text >

## 2019-05-19 NOTE — ED PROVIDER NOTE - OBJECTIVE STATEMENT
59yo F w/ pmhx of lupus, sjogren, primary biliary sclerosis, neuropathy, anemia, was seen yesterday in the ER for 1 week of fatigue, dizziness, intermittent fevers, vomiting, abd pain and itchiness, labs and CTap were unremarkable, brought in today by her friend, stating that itchiness returned and now worsening, despite taking Benadryl, pt is also newly altered, AAOx2, thought it was 2018, which is not her baseline, she is reporting headache as well. Denies any other symptoms.

## 2019-05-19 NOTE — ED ADULT NURSE NOTE - CHIEF COMPLAINT QUOTE
Pt KATHERINE Wall from home, reports Shortness of breath, Itchiness all over her body, Weakness, Abdominal pain, dry cough Fever Tmax 101F at home, Seen here yesterday for same complaints and D/C and claimed symptoms worsened given Benadryl Dilaudid. Addendum,0650hrs, EKG seen by  Red Attending. with changes

## 2019-05-20 ENCOUNTER — TRANSCRIPTION ENCOUNTER (OUTPATIENT)
Age: 59
End: 2019-05-20

## 2019-05-20 VITALS
HEART RATE: 78 BPM | HEIGHT: 59 IN | DIASTOLIC BLOOD PRESSURE: 81 MMHG | RESPIRATION RATE: 18 BRPM | SYSTOLIC BLOOD PRESSURE: 131 MMHG | OXYGEN SATURATION: 100 % | WEIGHT: 130.07 LBS | TEMPERATURE: 99 F

## 2019-05-20 DIAGNOSIS — E03.9 HYPOTHYROIDISM, UNSPECIFIED: ICD-10-CM

## 2019-05-20 LAB
BASOPHILS # BLD AUTO: 0.05 K/UL — SIGNIFICANT CHANGE UP (ref 0–0.2)
BASOPHILS NFR BLD AUTO: 0.6 % — SIGNIFICANT CHANGE UP (ref 0–2)
EOSINOPHIL # BLD AUTO: 0.59 K/UL — HIGH (ref 0–0.5)
EOSINOPHIL NFR BLD AUTO: 7.2 % — HIGH (ref 0–6)
HBA1C BLD-MCNC: 5.6 % — SIGNIFICANT CHANGE UP (ref 4–5.6)
HCT VFR BLD CALC: 33.7 % — LOW (ref 34.5–45)
HGB BLD-MCNC: 10.3 G/DL — LOW (ref 11.5–15.5)
IMM GRANULOCYTES NFR BLD AUTO: 0.4 % — SIGNIFICANT CHANGE UP (ref 0–1.5)
LYMPHOCYTES # BLD AUTO: 1.25 K/UL — SIGNIFICANT CHANGE UP (ref 1–3.3)
LYMPHOCYTES # BLD AUTO: 15.3 % — SIGNIFICANT CHANGE UP (ref 13–44)
MCHC RBC-ENTMCNC: 24.6 PG — LOW (ref 27–34)
MCHC RBC-ENTMCNC: 30.6 % — LOW (ref 32–36)
MCV RBC AUTO: 80.6 FL — SIGNIFICANT CHANGE UP (ref 80–100)
MONOCYTES # BLD AUTO: 0.79 K/UL — SIGNIFICANT CHANGE UP (ref 0–0.9)
MONOCYTES NFR BLD AUTO: 9.6 % — SIGNIFICANT CHANGE UP (ref 2–14)
NEUTROPHILS # BLD AUTO: 5.48 K/UL — SIGNIFICANT CHANGE UP (ref 1.8–7.4)
NEUTROPHILS NFR BLD AUTO: 66.9 % — SIGNIFICANT CHANGE UP (ref 43–77)
NRBC # FLD: 0 K/UL — SIGNIFICANT CHANGE UP (ref 0–0)
PLATELET # BLD AUTO: 278 K/UL — SIGNIFICANT CHANGE UP (ref 150–400)
PMV BLD: 10.4 FL — SIGNIFICANT CHANGE UP (ref 7–13)
RBC # BLD: 4.18 M/UL — SIGNIFICANT CHANGE UP (ref 3.8–5.2)
RBC # FLD: 15 % — HIGH (ref 10.3–14.5)
SPECIMEN SOURCE: SIGNIFICANT CHANGE UP
WBC # BLD: 8.19 K/UL — SIGNIFICANT CHANGE UP (ref 3.8–10.5)
WBC # FLD AUTO: 8.19 K/UL — SIGNIFICANT CHANGE UP (ref 3.8–10.5)

## 2019-05-20 PROCEDURE — 99239 HOSP IP/OBS DSCHRG MGMT >30: CPT

## 2019-05-20 RX ORDER — IPRATROPIUM/ALBUTEROL SULFATE 18-103MCG
3 AEROSOL WITH ADAPTER (GRAM) INHALATION ONCE
Refills: 0 | Status: COMPLETED | OUTPATIENT
Start: 2019-05-20 | End: 2019-05-20

## 2019-05-20 RX ORDER — ONDANSETRON 8 MG/1
4 TABLET, FILM COATED ORAL ONCE
Refills: 0 | Status: COMPLETED | OUTPATIENT
Start: 2019-05-20 | End: 2019-05-20

## 2019-05-20 RX ORDER — OXYCODONE HYDROCHLORIDE 5 MG/1
5 TABLET ORAL ONCE
Refills: 0 | Status: DISCONTINUED | OUTPATIENT
Start: 2019-05-20 | End: 2019-05-20

## 2019-05-20 RX ORDER — CEPHALEXIN 500 MG
1 CAPSULE ORAL
Qty: 6 | Refills: 0
Start: 2019-05-20 | End: 2019-05-22

## 2019-05-20 RX ORDER — LAMOTRIGINE 25 MG/1
1 TABLET, ORALLY DISINTEGRATING ORAL
Qty: 0 | Refills: 0 | DISCHARGE

## 2019-05-20 RX ORDER — LAMOTRIGINE 25 MG/1
7 TABLET, ORALLY DISINTEGRATING ORAL
Qty: 0 | Refills: 0 | DISCHARGE
Start: 2019-05-20

## 2019-05-20 RX ORDER — HYDROMORPHONE HYDROCHLORIDE 2 MG/ML
0.5 INJECTION INTRAMUSCULAR; INTRAVENOUS; SUBCUTANEOUS ONCE
Refills: 0 | Status: DISCONTINUED | OUTPATIENT
Start: 2019-05-20 | End: 2019-05-20

## 2019-05-20 RX ORDER — LANOLIN ALCOHOL/MO/W.PET/CERES
2 CREAM (GRAM) TOPICAL
Qty: 0 | Refills: 0 | DISCHARGE
Start: 2019-05-20

## 2019-05-20 RX ADMIN — HYDROMORPHONE HYDROCHLORIDE 4 MILLIGRAM(S): 2 INJECTION INTRAMUSCULAR; INTRAVENOUS; SUBCUTANEOUS at 06:06

## 2019-05-20 RX ADMIN — ENOXAPARIN SODIUM 40 MILLIGRAM(S): 100 INJECTION SUBCUTANEOUS at 06:05

## 2019-05-20 RX ADMIN — Medication 3 MILLILITER(S): at 06:06

## 2019-05-20 RX ADMIN — Medication 200 MILLIGRAM(S): at 08:55

## 2019-05-20 RX ADMIN — CEFTRIAXONE 100 GRAM(S): 500 INJECTION, POWDER, FOR SOLUTION INTRAMUSCULAR; INTRAVENOUS at 14:10

## 2019-05-20 RX ADMIN — Medication 120 MILLIGRAM(S): at 06:06

## 2019-05-20 RX ADMIN — Medication 25 MILLIGRAM(S): at 02:14

## 2019-05-20 RX ADMIN — Medication 75 MICROGRAM(S): at 06:06

## 2019-05-20 RX ADMIN — HYDROMORPHONE HYDROCHLORIDE 4 MILLIGRAM(S): 2 INJECTION INTRAMUSCULAR; INTRAVENOUS; SUBCUTANEOUS at 14:19

## 2019-05-20 RX ADMIN — HYDROMORPHONE HYDROCHLORIDE 4 MILLIGRAM(S): 2 INJECTION INTRAMUSCULAR; INTRAVENOUS; SUBCUTANEOUS at 00:10

## 2019-05-20 RX ADMIN — ONDANSETRON 4 MILLIGRAM(S): 8 TABLET, FILM COATED ORAL at 08:49

## 2019-05-20 RX ADMIN — HYDROMORPHONE HYDROCHLORIDE 4 MILLIGRAM(S): 2 INJECTION INTRAMUSCULAR; INTRAVENOUS; SUBCUTANEOUS at 13:01

## 2019-05-20 RX ADMIN — OXYCODONE HYDROCHLORIDE 5 MILLIGRAM(S): 5 TABLET ORAL at 01:20

## 2019-05-20 RX ADMIN — HYDROMORPHONE HYDROCHLORIDE 0.5 MILLIGRAM(S): 2 INJECTION INTRAMUSCULAR; INTRAVENOUS; SUBCUTANEOUS at 09:19

## 2019-05-20 RX ADMIN — LAMOTRIGINE 175 MILLIGRAM(S): 25 TABLET, ORALLY DISINTEGRATING ORAL at 13:35

## 2019-05-20 RX ADMIN — URSODIOL 300 MILLIGRAM(S): 250 TABLET, FILM COATED ORAL at 06:06

## 2019-05-20 RX ADMIN — OXYCODONE HYDROCHLORIDE 5 MILLIGRAM(S): 5 TABLET ORAL at 00:24

## 2019-05-20 RX ADMIN — HYDROMORPHONE HYDROCHLORIDE 0.5 MILLIGRAM(S): 2 INJECTION INTRAMUSCULAR; INTRAVENOUS; SUBCUTANEOUS at 08:49

## 2019-05-20 RX ADMIN — Medication 25 MILLIGRAM(S): at 10:19

## 2019-05-20 NOTE — DISCHARGE NOTE NURSING/CASE MANAGEMENT/SOCIAL WORK - NSDCDPATPORTLINK_GEN_ALL_CORE
You can access the [facility] Portal, offered by [facility], by registering with the following website: [email or web address]

## 2019-05-20 NOTE — DISCHARGE NOTE PROVIDER - HOSPITAL COURSE
58F with Sjogren's Raynaud's, PBS, presented with metabolic encephalopathy secondary to UTI and clinical pyelonephritis.             Hospital Course:            Acute cystitis without hematuria.      UA+, +CVAT on exam possible pyelo. CT from 5/18 w/o abscess or collections.     c/w IVF    c/w Ceftriaxone (h/o pcn allergy unknown. tolerated Ceftriaxone in ED)    f/u cultures and adjust abx.             Primary biliary cirrhosis.      c/w home ursodiol.              Mild intermittent asthma, unspecified whether complicated.      Duonebs prn    currently w/o any exacerbation.              Other chronic pain.       c/w dilaudid prn.                 Raynaud's disease without gangrene.      c/w home CCB.    BP hypertensive.         Dispo-  Stable for discharge home with PCP follow up in 1 week.  Continue Keflex for 3 additional days              Sjogren's syndrome, with unspecified organ involvement. Prophylactic measure      c/w Plaquenil. 58F with Sjogren's Raynaud's, PBS, presented with metabolic encephalopathy secondary to UTI and clinical pyelonephritis.             Hospital Course:            Acute cystitis without hematuria.      UA+,  CT from 5/18 w/o abscess or collections.     c/w IVF    c/w Ceftriaxone (h/o pcn allergy unknown. tolerated Ceftriaxone in ED)                Primary biliary cirrhosis.      c/w home ursodiol.              Mild intermittent asthma, unspecified whether complicated.      Duonebs prn    currently w/o any exacerbation.              Other chronic pain.       c/w dilaudid prn.                 Raynaud's disease without gangrene.      c/w home CCB.    BP hypertensive.         Dispo-  Stable for discharge home with PCP follow up in 1 week.  Continue Keflex for 3 additional days              Sjogren's syndrome, with unspecified organ involvement. Prophylactic measure      c/w Plaquenil.

## 2019-05-20 NOTE — PROGRESS NOTE ADULT - SUBJECTIVE AND OBJECTIVE BOX
Patient is a 58y old  Female who presents with a chief complaint of weakness (19 May 2019 18:52)      SUBJECTIVE / OVERNIGHT EVENTS: Pt in NAD no acute fever ON. denies N/V/D     MEDICATIONS  (STANDING):  cefTRIAXone   IVPB 1 Gram(s) IV Intermittent every 24 hours  diltiazem    milliGRAM(s) Oral daily  enoxaparin Injectable 40 milliGRAM(s) SubCutaneous every 24 hours  HYDROmorphone   Tablet 4 milliGRAM(s) Oral every 8 hours  hydroxychloroquine 200 milliGRAM(s) Oral two times a day  lamoTRIgine 175 milliGRAM(s) Oral daily  levothyroxine 75 MICROGram(s) Oral daily  melatonin 6 milliGRAM(s) Oral at bedtime  sodium chloride 0.9%. 1000 milliLiter(s) (75 mL/Hr) IV Continuous <Continuous>  ursodiol Capsule 300 milliGRAM(s) Oral two times a day    MEDICATIONS  (PRN):  Biotene Dry Mouth Oral Rinse 5 milliLiter(s) Swish and Spit four times a day PRN Dry mouth  diphenhydrAMINE 25 milliGRAM(s) Oral every 6 hours PRN Rash and/or Itching        CAPILLARY BLOOD GLUCOSE        I&O's Summary      T(C): 37.2 (19 @ 13:39), Max: 37.2 (19 @ 13:39)  HR: 78 (19 @ 13:39) (78 - 98)  BP: 131/81 (-19 @ 13:39) (123/66 - 141/89)  RR: 18 (19 @ 13:39) (17 - 18)  SpO2: 100% (19 @ 13:39) (97% - 100%)    PHYSICAL EXAM:  GENERAL: NAD, well-developed  HEAD:  Atraumatic, Normocephalic  EYES: EOMI, PERRLA, conjunctiva and sclera clear  NECK: Supple, No JVD  CHEST/LUNG: Clear to auscultation bilaterally; No wheeze  HEART: Regular rate and rhythm; No murmurs, rubs, or gallops  ABDOMEN: Soft,  Bowel sounds present; chronic RT sided pain    EXTREMITIES:  2+ Peripheral Pulses, No clubbing, cyanosis, or edema  PSYCH: AAOx3 currently at baseline as per   NEUROLOGY: non-focal  SKIN: No rashes or lesions    LABS:                        10.3   8.19  )-----------( 278      ( 20 May 2019 06:20 )             33.7         140  |  102  |  8   ----------------------------<  86  3.7   |  24  |  0.74    Ca    9.7      19 May 2019 07:15    TPro  6.9  /  Alb  4.4  /  TBili  < 0.2<L>  /  DBili  x   /  AST  19  /  ALT  12  /  AlkPhos  91      PT/INR - ( 19 May 2019 07:15 )   PT: 10.0 SEC;   INR: 0.88          PTT - ( 19 May 2019 07:15 )  PTT:26.5 SEC      Urinalysis Basic - ( 19 May 2019 12:30 )    Color: LIGHT YELLOW / Appearance: Lt TURBID / S.023 / pH: 7.0  Gluc: NEGATIVE / Ketone: NEGATIVE  / Bili: NEGATIVE / Urobili: NORMAL   Blood: NEGATIVE / Protein: 30 / Nitrite: NEGATIVE   Leuk Esterase: LARGE / RBC: 11-25 / WBC >50   Sq Epi: FEW / Non Sq Epi: x / Bacteria: 1+          RADIOLOGY & ADDITIONAL TESTS:    Imaging Personally Reviewed:    Consultant(s) Notes Reviewed:      Care Discussed with Consultants/Other Providers:

## 2019-05-20 NOTE — PROGRESS NOTE ADULT - PROBLEM SELECTOR PLAN 1
UA+, UCx neg +CVAT on exam as per rheumatologist Dr Gerald Vasquez has chronic RT side pain on pain meds. CT from 5/18 w/o abscess or collections.   Clarified with  that no fever prior to coming to hospital as documented in triage note. Pt and  would like to go home as afebrile and now back to baseline case also d/w Dr Vasquez [phone] who agreeable to plan. discharge on Keflex 500 mg PO q12 x 5 days total

## 2019-05-20 NOTE — DISCHARGE NOTE PROVIDER - NSDCCPCAREPLAN_GEN_ALL_CORE_FT
PRINCIPAL DISCHARGE DIAGNOSIS  Diagnosis: Acute cystitis without hematuria  Assessment and Plan of Treatment: Continue Keflex 500 mg every 12 hours for 3 days.   follow up with your PCP within 1 week of discharge.      SECONDARY DISCHARGE DIAGNOSES  Diagnosis: Acquired hypothyroidism  Assessment and Plan of Treatment: Continue Synthroid as prescribed.

## 2019-05-21 LAB — BACTERIA UR CULT: SIGNIFICANT CHANGE UP

## 2019-06-05 ENCOUNTER — INPATIENT (INPATIENT)
Facility: HOSPITAL | Age: 59
LOS: 1 days | Discharge: HOME | End: 2019-06-07
Attending: INTERNAL MEDICINE | Admitting: INTERNAL MEDICINE
Payer: COMMERCIAL

## 2019-06-05 VITALS
DIASTOLIC BLOOD PRESSURE: 76 MMHG | RESPIRATION RATE: 16 BRPM | OXYGEN SATURATION: 98 % | HEART RATE: 94 BPM | TEMPERATURE: 98 F | SYSTOLIC BLOOD PRESSURE: 113 MMHG

## 2019-06-05 DIAGNOSIS — Z29.9 ENCOUNTER FOR PROPHYLACTIC MEASURES, UNSPECIFIED: ICD-10-CM

## 2019-06-05 DIAGNOSIS — Z90.49 ACQUIRED ABSENCE OF OTHER SPECIFIED PARTS OF DIGESTIVE TRACT: Chronic | ICD-10-CM

## 2019-06-05 DIAGNOSIS — K74.3 PRIMARY BILIARY CIRRHOSIS: ICD-10-CM

## 2019-06-05 DIAGNOSIS — I73.00 RAYNAUD'S SYNDROME WITHOUT GANGRENE: ICD-10-CM

## 2019-06-05 DIAGNOSIS — Z98.89 OTHER SPECIFIED POSTPROCEDURAL STATES: Chronic | ICD-10-CM

## 2019-06-05 DIAGNOSIS — J45.20 MILD INTERMITTENT ASTHMA, UNCOMPLICATED: ICD-10-CM

## 2019-06-05 DIAGNOSIS — E03.9 HYPOTHYROIDISM, UNSPECIFIED: ICD-10-CM

## 2019-06-05 DIAGNOSIS — N30.00 ACUTE CYSTITIS WITHOUT HEMATURIA: ICD-10-CM

## 2019-06-05 DIAGNOSIS — M35.00 SJOGREN SYNDROME, UNSPECIFIED: ICD-10-CM

## 2019-06-05 DIAGNOSIS — R55 SYNCOPE AND COLLAPSE: ICD-10-CM

## 2019-06-05 DIAGNOSIS — N39.0 URINARY TRACT INFECTION, SITE NOT SPECIFIED: ICD-10-CM

## 2019-06-05 DIAGNOSIS — Z90.89 ACQUIRED ABSENCE OF OTHER ORGANS: Chronic | ICD-10-CM

## 2019-06-05 PROBLEM — D89.89 OTHER SPECIFIED DISORDERS INVOLVING THE IMMUNE MECHANISM, NOT ELSEWHERE CLASSIFIED: Chronic | Status: ACTIVE | Noted: 2019-05-20

## 2019-06-05 PROBLEM — G62.9 POLYNEUROPATHY, UNSPECIFIED: Chronic | Status: ACTIVE | Noted: 2019-05-20

## 2019-06-05 LAB
ALBUMIN SERPL ELPH-MCNC: 4.5 G/DL — SIGNIFICANT CHANGE UP (ref 3.3–5)
ALP SERPL-CCNC: 89 U/L — SIGNIFICANT CHANGE UP (ref 40–120)
ALT FLD-CCNC: 11 U/L — SIGNIFICANT CHANGE UP (ref 4–33)
AMMONIA BLD-MCNC: 48 UMOL/L — SIGNIFICANT CHANGE UP (ref 11–55)
AMPHET UR-MCNC: NEGATIVE — SIGNIFICANT CHANGE UP
ANION GAP SERPL CALC-SCNC: 14 MMO/L — SIGNIFICANT CHANGE UP (ref 7–14)
APAP SERPL-MCNC: < 15 UG/ML — LOW (ref 15–25)
APPEARANCE UR: CLEAR — SIGNIFICANT CHANGE UP
AST SERPL-CCNC: 13 U/L — SIGNIFICANT CHANGE UP (ref 4–32)
BACTERIA # UR AUTO: HIGH
BARBITURATES UR SCN-MCNC: NEGATIVE — SIGNIFICANT CHANGE UP
BASE EXCESS BLDV CALC-SCNC: 3.7 MMOL/L — SIGNIFICANT CHANGE UP
BASOPHILS # BLD AUTO: 0.07 K/UL — SIGNIFICANT CHANGE UP (ref 0–0.2)
BASOPHILS NFR BLD AUTO: 0.7 % — SIGNIFICANT CHANGE UP (ref 0–2)
BENZODIAZ UR-MCNC: NEGATIVE — SIGNIFICANT CHANGE UP
BILIRUB SERPL-MCNC: < 0.2 MG/DL — LOW (ref 0.2–1.2)
BILIRUB UR-MCNC: NEGATIVE — SIGNIFICANT CHANGE UP
BLOOD GAS VENOUS - CREATININE: 0.79 MG/DL — SIGNIFICANT CHANGE UP (ref 0.5–1.3)
BLOOD UR QL VISUAL: NEGATIVE — SIGNIFICANT CHANGE UP
BUN SERPL-MCNC: 19 MG/DL — SIGNIFICANT CHANGE UP (ref 7–23)
CALCIUM SERPL-MCNC: 9.4 MG/DL — SIGNIFICANT CHANGE UP (ref 8.4–10.5)
CANNABINOIDS UR-MCNC: NEGATIVE — SIGNIFICANT CHANGE UP
CHLORIDE BLDV-SCNC: 102 MMOL/L — SIGNIFICANT CHANGE UP (ref 96–108)
CHLORIDE SERPL-SCNC: 100 MMOL/L — SIGNIFICANT CHANGE UP (ref 98–107)
CK MB BLD-MCNC: < 1 NG/ML — LOW (ref 1–4.7)
CK SERPL-CCNC: 50 U/L — SIGNIFICANT CHANGE UP (ref 25–170)
CO2 SERPL-SCNC: 27 MMOL/L — SIGNIFICANT CHANGE UP (ref 22–31)
COCAINE METAB.OTHER UR-MCNC: NEGATIVE — SIGNIFICANT CHANGE UP
COLOR SPEC: YELLOW — SIGNIFICANT CHANGE UP
CREAT SERPL-MCNC: 0.78 MG/DL — SIGNIFICANT CHANGE UP (ref 0.5–1.3)
EOSINOPHIL # BLD AUTO: 0.57 K/UL — HIGH (ref 0–0.5)
EOSINOPHIL NFR BLD AUTO: 5.6 % — SIGNIFICANT CHANGE UP (ref 0–6)
ETHANOL BLD-MCNC: < 10 MG/DL — SIGNIFICANT CHANGE UP
GAS PNL BLDV: 137 MMOL/L — SIGNIFICANT CHANGE UP (ref 136–146)
GLUCOSE BLDV-MCNC: 88 MG/DL — SIGNIFICANT CHANGE UP (ref 70–99)
GLUCOSE SERPL-MCNC: 91 MG/DL — SIGNIFICANT CHANGE UP (ref 70–99)
GLUCOSE UR-MCNC: NEGATIVE — SIGNIFICANT CHANGE UP
HCO3 BLDV-SCNC: 25 MMOL/L — SIGNIFICANT CHANGE UP (ref 20–27)
HCT VFR BLD CALC: 35.6 % — SIGNIFICANT CHANGE UP (ref 34.5–45)
HCT VFR BLDV CALC: 34.5 % — SIGNIFICANT CHANGE UP (ref 34.5–45)
HGB BLD-MCNC: 10.6 G/DL — LOW (ref 11.5–15.5)
HGB BLDV-MCNC: 11.2 G/DL — LOW (ref 11.5–15.5)
HYALINE CASTS # UR AUTO: HIGH
IMM GRANULOCYTES NFR BLD AUTO: 0.7 % — SIGNIFICANT CHANGE UP (ref 0–1.5)
KETONES UR-MCNC: NEGATIVE — SIGNIFICANT CHANGE UP
LACTATE BLDV-MCNC: 1.5 MMOL/L — SIGNIFICANT CHANGE UP (ref 0.5–2)
LEUKOCYTE ESTERASE UR-ACNC: SIGNIFICANT CHANGE UP
LYMPHOCYTES # BLD AUTO: 1.25 K/UL — SIGNIFICANT CHANGE UP (ref 1–3.3)
LYMPHOCYTES # BLD AUTO: 12.4 % — LOW (ref 13–44)
MCHC RBC-ENTMCNC: 23.9 PG — LOW (ref 27–34)
MCHC RBC-ENTMCNC: 29.8 % — LOW (ref 32–36)
MCV RBC AUTO: 80.4 FL — SIGNIFICANT CHANGE UP (ref 80–100)
METHADONE UR-MCNC: NEGATIVE — SIGNIFICANT CHANGE UP
MONOCYTES # BLD AUTO: 0.93 K/UL — HIGH (ref 0–0.9)
MONOCYTES NFR BLD AUTO: 9.2 % — SIGNIFICANT CHANGE UP (ref 2–14)
NEUTROPHILS # BLD AUTO: 7.23 K/UL — SIGNIFICANT CHANGE UP (ref 1.8–7.4)
NEUTROPHILS NFR BLD AUTO: 71.4 % — SIGNIFICANT CHANGE UP (ref 43–77)
NITRITE UR-MCNC: NEGATIVE — SIGNIFICANT CHANGE UP
NRBC # FLD: 0.02 K/UL — SIGNIFICANT CHANGE UP (ref 0–0)
OPIATES UR-MCNC: POSITIVE — SIGNIFICANT CHANGE UP
OXYCODONE UR-MCNC: NEGATIVE — SIGNIFICANT CHANGE UP
PCO2 BLDV: 59 MMHG — HIGH (ref 41–51)
PCP UR-MCNC: NEGATIVE — SIGNIFICANT CHANGE UP
PH BLDV: 7.32 PH — SIGNIFICANT CHANGE UP (ref 7.32–7.43)
PH UR: 6 — SIGNIFICANT CHANGE UP (ref 5–8)
PLATELET # BLD AUTO: 334 K/UL — SIGNIFICANT CHANGE UP (ref 150–400)
PMV BLD: 10.3 FL — SIGNIFICANT CHANGE UP (ref 7–13)
PO2 BLDV: 21 MMHG — LOW (ref 35–40)
POTASSIUM BLDV-SCNC: 3.4 MMOL/L — SIGNIFICANT CHANGE UP (ref 3.4–4.5)
POTASSIUM SERPL-MCNC: 3.6 MMOL/L — SIGNIFICANT CHANGE UP (ref 3.5–5.3)
POTASSIUM SERPL-SCNC: 3.6 MMOL/L — SIGNIFICANT CHANGE UP (ref 3.5–5.3)
PROT SERPL-MCNC: 7.1 G/DL — SIGNIFICANT CHANGE UP (ref 6–8.3)
PROT UR-MCNC: 70 — SIGNIFICANT CHANGE UP
RBC # BLD: 4.43 M/UL — SIGNIFICANT CHANGE UP (ref 3.8–5.2)
RBC # FLD: 14.6 % — HIGH (ref 10.3–14.5)
RBC CASTS # UR COMP ASSIST: SIGNIFICANT CHANGE UP (ref 0–?)
SALICYLATES SERPL-MCNC: < 5 MG/DL — LOW (ref 15–30)
SAO2 % BLDV: 24 % — LOW (ref 60–85)
SODIUM SERPL-SCNC: 141 MMOL/L — SIGNIFICANT CHANGE UP (ref 135–145)
SP GR SPEC: 1.03 — SIGNIFICANT CHANGE UP (ref 1–1.04)
SQUAMOUS # UR AUTO: SIGNIFICANT CHANGE UP
TROPONIN T, HIGH SENSITIVITY: < 6 NG/L — SIGNIFICANT CHANGE UP (ref ?–14)
TROPONIN T, HIGH SENSITIVITY: < 6 NG/L — SIGNIFICANT CHANGE UP (ref ?–14)
TSH SERPL-MCNC: 8.32 UIU/ML — HIGH (ref 0.27–4.2)
UROBILINOGEN FLD QL: NORMAL — SIGNIFICANT CHANGE UP
WBC # BLD: 10.12 K/UL — SIGNIFICANT CHANGE UP (ref 3.8–10.5)
WBC # FLD AUTO: 10.12 K/UL — SIGNIFICANT CHANGE UP (ref 3.8–10.5)
WBC UR QL: HIGH (ref 0–?)

## 2019-06-05 PROCEDURE — 71046 X-RAY EXAM CHEST 2 VIEWS: CPT | Mod: 26

## 2019-06-05 PROCEDURE — 70450 CT HEAD/BRAIN W/O DYE: CPT | Mod: 26

## 2019-06-05 RX ORDER — LEVOTHYROXINE SODIUM 125 MCG
75 TABLET ORAL
Refills: 0 | Status: DISCONTINUED | OUTPATIENT
Start: 2019-06-05 | End: 2019-06-07

## 2019-06-05 RX ORDER — ONDANSETRON 8 MG/1
4 TABLET, FILM COATED ORAL ONCE
Refills: 0 | Status: COMPLETED | OUTPATIENT
Start: 2019-06-05 | End: 2019-06-05

## 2019-06-05 RX ORDER — CEFTRIAXONE 500 MG/1
1 INJECTION, POWDER, FOR SOLUTION INTRAMUSCULAR; INTRAVENOUS ONCE
Refills: 0 | Status: COMPLETED | OUTPATIENT
Start: 2019-06-05 | End: 2019-06-05

## 2019-06-05 RX ORDER — SODIUM CHLORIDE 9 MG/ML
1000 INJECTION INTRAMUSCULAR; INTRAVENOUS; SUBCUTANEOUS ONCE
Refills: 0 | Status: COMPLETED | OUTPATIENT
Start: 2019-06-05 | End: 2019-06-05

## 2019-06-05 RX ORDER — LAMOTRIGINE 25 MG/1
175 TABLET, ORALLY DISINTEGRATING ORAL AT BEDTIME
Refills: 0 | Status: DISCONTINUED | OUTPATIENT
Start: 2019-06-05 | End: 2019-06-07

## 2019-06-05 RX ORDER — IPRATROPIUM/ALBUTEROL SULFATE 18-103MCG
3 AEROSOL WITH ADAPTER (GRAM) INHALATION EVERY 6 HOURS
Refills: 0 | Status: DISCONTINUED | OUTPATIENT
Start: 2019-06-05 | End: 2019-06-07

## 2019-06-05 RX ORDER — ACETAMINOPHEN 500 MG
650 TABLET ORAL ONCE
Refills: 0 | Status: COMPLETED | OUTPATIENT
Start: 2019-06-05 | End: 2019-06-05

## 2019-06-05 RX ORDER — URSODIOL 250 MG/1
300 TABLET, FILM COATED ORAL
Refills: 0 | Status: DISCONTINUED | OUTPATIENT
Start: 2019-06-05 | End: 2019-06-07

## 2019-06-05 RX ORDER — HEPARIN SODIUM 5000 [USP'U]/ML
5000 INJECTION INTRAVENOUS; SUBCUTANEOUS EVERY 12 HOURS
Refills: 0 | Status: DISCONTINUED | OUTPATIENT
Start: 2019-06-05 | End: 2019-06-07

## 2019-06-05 RX ORDER — CEFTRIAXONE 500 MG/1
1 INJECTION, POWDER, FOR SOLUTION INTRAMUSCULAR; INTRAVENOUS EVERY 24 HOURS
Refills: 0 | Status: DISCONTINUED | OUTPATIENT
Start: 2019-06-06 | End: 2019-06-07

## 2019-06-05 RX ORDER — DILTIAZEM HCL 120 MG
120 CAPSULE, EXT RELEASE 24 HR ORAL DAILY
Refills: 0 | Status: DISCONTINUED | OUTPATIENT
Start: 2019-06-05 | End: 2019-06-06

## 2019-06-05 RX ORDER — HYDROXYCHLOROQUINE SULFATE 200 MG
200 TABLET ORAL
Refills: 0 | Status: DISCONTINUED | OUTPATIENT
Start: 2019-06-05 | End: 2019-06-07

## 2019-06-05 RX ADMIN — CEFTRIAXONE 1 GRAM(S): 500 INJECTION, POWDER, FOR SOLUTION INTRAMUSCULAR; INTRAVENOUS at 14:43

## 2019-06-05 RX ADMIN — Medication 1 MILLIGRAM(S): at 13:39

## 2019-06-05 RX ADMIN — ONDANSETRON 4 MILLIGRAM(S): 8 TABLET, FILM COATED ORAL at 09:49

## 2019-06-05 RX ADMIN — ONDANSETRON 4 MILLIGRAM(S): 8 TABLET, FILM COATED ORAL at 17:44

## 2019-06-05 RX ADMIN — Medication 650 MILLIGRAM(S): at 09:49

## 2019-06-05 RX ADMIN — SODIUM CHLORIDE 2000 MILLILITER(S): 9 INJECTION INTRAMUSCULAR; INTRAVENOUS; SUBCUTANEOUS at 09:50

## 2019-06-05 RX ADMIN — LAMOTRIGINE 175 MILLIGRAM(S): 25 TABLET, ORALLY DISINTEGRATING ORAL at 23:05

## 2019-06-05 RX ADMIN — CEFTRIAXONE 100 GRAM(S): 500 INJECTION, POWDER, FOR SOLUTION INTRAMUSCULAR; INTRAVENOUS at 12:22

## 2019-06-05 RX ADMIN — SODIUM CHLORIDE 1000 MILLILITER(S): 9 INJECTION INTRAMUSCULAR; INTRAVENOUS; SUBCUTANEOUS at 14:43

## 2019-06-05 RX ADMIN — Medication 650 MILLIGRAM(S): at 20:29

## 2019-06-05 RX ADMIN — Medication 650 MILLIGRAM(S): at 21:12

## 2019-06-05 NOTE — ED PROVIDER NOTE - PMH
Asthma    Autoimmune skin disease    Hypothyroid    Neuropathy    Primary biliary cirrhosis    Sjogren's syndrome

## 2019-06-05 NOTE — ED PROVIDER NOTE - PROGRESS NOTE DETAILS
Spoke with Dr. Melara regarding patient - will admit to his service pending labs/ct. Will call Dr. Melara with results prior to admission.

## 2019-06-05 NOTE — H&P ADULT - PROBLEM SELECTOR PLAN 1
Admit to telemetry, serial CE's, serial EKG  HgbA1C, TSH, lipid profile, CBC, CMP in am   TTE ordered to evaluate LVEF   Orthostatics ordered, if positive will start patient on maintenance IVF   Fall precautions  Neuro checks q4hrs

## 2019-06-05 NOTE — ED PROVIDER NOTE - ATTENDING CONTRIBUTION TO CARE
57 yo female presents to ED for evaluation of ?syncopal fall. Patient reports she fell in the bathroom, cannot recall if she tripped or why she fell. +LOC. Denies taking AC. Patient reports she has been having visual hallucinations. +dysuria and increased urinary frequency.    Gen: no acute distress, well appearing, awake  Cardiac: regular rate and rhythm, +S1S2  Pulm: Clear to auscultation bilaterally  Abd: soft, nontender, nondistended, no guarding  Back: neg CVA ttp, nontender spine  Neuro: awake, alerts ensorimotor intact    MDM  Female presents to ED for evaluation of ?syncopal fall and multiple other complaints - Will check labs, EKG, imaging, medicate, admit

## 2019-06-05 NOTE — ED ADULT TRIAGE NOTE - CHIEF COMPLAINT QUOTE
Ambulatory via hospital wheelchair s/p slip and fall in the bathroom and hitting her posterior skull. (+) LOC. Reports blurry vision, worse on the R, HA, dizziness and "conversations with people that aren't there." Reports that she feels very confused. Patient is lethargic in triage. PMH cholangitis, hypothyroid. Denies anticoagulation. EKG in progress.

## 2019-06-05 NOTE — H&P ADULT - NEGATIVE OPHTHALMOLOGIC SYMPTOMS
no discharge R/no lacrimation L/no lacrimation R/no discharge L/no photophobia/no blurred vision L/no blurred vision R/no diplopia

## 2019-06-05 NOTE — H&P ADULT - PROBLEM SELECTOR PLAN 2
Patient with recent admission for UTI about 1 month ago and was treated with IV Ceftriaxone without any adverse side effects.   UA revealed Moderate leuks, negative nitrites. Patient endorsed of dysuria and frequency. No WBC count and not febrile on vital signs   Will continue with IV Ceftriaxone for now (h/o pcn allergy-but patient is not sure what happens when she gets pcn drugs). Tolerated Ceftriaxone in ED today and last month as well)  Urine cultures ordered

## 2019-06-05 NOTE — H&P ADULT - RS GEN PE MLT RESP DETAILS PC
clear to auscultation bilaterally/no intercostal retractions/normal/respirations non-labored/no rales/good air movement/no chest wall tenderness/no rhonchi/no wheezes/breath sounds equal/airway patent

## 2019-06-05 NOTE — ED ADULT NURSE NOTE - OBJECTIVE STATEMENT
Pt s/p fall hit her head. pt co dizziness with nausea no vomiting noted vs wnl Pt is NSR on cardiac monitor. Pt is awake and alert x 4. Tylenol given for pain awaking dipo.

## 2019-06-05 NOTE — H&P ADULT - NSICDXPASTMEDICALHX_GEN_ALL_CORE_FT
PAST MEDICAL HISTORY:  Asthma     Autoimmune skin disease     Hypothyroid     Neuropathy     Primary biliary cirrhosis     Sjogren's syndrome

## 2019-06-05 NOTE — H&P ADULT - NEGATIVE NEUROLOGICAL SYMPTOMS
no generalized seizures/no tremors/no hemiparesis/no transient paralysis/no weakness/no vertigo/no focal seizures/no paresthesias/no loss of sensation/no difficulty walking

## 2019-06-05 NOTE — H&P ADULT - ASSESSMENT
57 y/o F with PMH of Sjogren's, Raynaud's, Primary Biliary sclerosis, Anemia, Asthma presented to the ED for fall with possible syncope today.

## 2019-06-05 NOTE — H&P ADULT - NEGATIVE GASTROINTESTINAL SYMPTOMS
no nausea/no vomiting/no diarrhea/no hematochezia/no constipation/no melena/no change in bowel habits/no abdominal pain

## 2019-06-05 NOTE — ED PROVIDER NOTE - CLINICAL SUMMARY MEDICAL DECISION MAKING FREE TEXT BOX
57 y/o female w/ generalized fatigues and ?sycopal episodes/head trauma/LOC  -possible encephalopathy secondry to uti w/ syncope, r/o intracranial pathology  -labs, ua, cxt, ct head,  -probable admission

## 2019-06-05 NOTE — H&P ADULT - NSHPLABSRESULTS_GEN_ALL_CORE
10.6   10.12 )-----------( 334      ( 05 Jun 2019 08:50 )             35.6     06-05    141  |  100  |  19  ----------------------------<  91  3.6   |  27  |  0.78    Ca    9.4      05 Jun 2019 08:50    TPro  7.1  /  Alb  4.5  /  TBili  < 0.2<L>  /  DBili  x   /  AST  13  /  ALT  11  /  AlkPhos  89  06-05    CT head: No acute intracranial hemorrhage. No interval change. Microvascular disease.    CXR:  No acute pulmonary pathology.    EKG: NSR at a rate of 90 with TWI in leads V2-V4, III and QTc of 472

## 2019-06-05 NOTE — H&P ADULT - HISTORY OF PRESENT ILLNESS
57 y/o F with PMH of Sjogren's, Raynaud's, Primary Biliary sclerosis, Anemia, Asthma presented to the ED for fall with possible syncope today. As per the patient for the past few days she has been experiencing generalized weakness and fatigue. Patient stated that she was walking to the bathroom this morning when she thinks she had lightheadedness or dizziness and then fell on the floor hitting her head. Patient is unsure how long she is was on the floor or if she had LOC and who helped her up. Patient stated that since her fall she is having visual hallucinations(where she is arguing with people who are not there). Patient also endorsed of dysuria and frequency and was admitted last month for UTI. Patient also endorsed of headache after the fall. Patient endorsed of ANGUIANO and stated that when she is climbing a flight of stairs she would get tired and winded and would have to stop to catch her breath. Patient also endorsed of intermittent chest heaviness. Patient denied any fevers, chills, N/V/D/C, abdominal pain, dysuria, melena, hematochezia, recent travel, sick contact, pleuritic or positional chest pain.     On ED admission EKG revealed NSR at a rate of 90 with TWI in leads V2-V4, III and QTc of 472, CE x 1: Negative, H&H: 10.6/35.6, TSH: 8.32, UA: Moderate leuks, negative nitrites, Serum tox: Negative. CT head: No acute intracranial hemorrhage. No interval change. Microvascular disease. CXR:  No acute pulmonary pathology. In the ED patient received IV Ceftriaxone and 1L of fluid. When examined patient is resting in the stretcher and denied any current complaints.

## 2019-06-05 NOTE — H&P ADULT - NEGATIVE ENMT SYMPTOMS
no tinnitus/no sinus symptoms/no nasal congestion/no vertigo/no nasal discharge/no hearing difficulty/no ear pain

## 2019-06-05 NOTE — ED PROVIDER NOTE - OBJECTIVE STATEMENT
58F with PMHx Sjogren's syndrome, Raynaud's, primary biliary sclerosis, anemia, neuropathy presents to ED c/o fall/?syncope. Pt. states for the past few days has been experiencing worsening generalized weakness/fatigue. States today while in the bathroom fell to the ground- usnure if she tripped or if she passed out causing her to fall and hit back of head on tile floor. +LOC. Pt. states since fall feels like she is having visual hallucinations and having "conversations with people who aren't there". Pt. also admits to recnet dysuria and frequency as well. (recent admission for metabolic encephatlopathy/UTI). C/o headache and dizziness as well. also stats feels chest pressure as well. Denies fever chills sob couhh recent uri.

## 2019-06-06 LAB
BACTERIA UR CULT: SIGNIFICANT CHANGE UP
CHOLEST SERPL-MCNC: 244 MG/DL — HIGH (ref 120–199)
GLUCOSE BLDC GLUCOMTR-MCNC: 101 MG/DL — HIGH (ref 70–99)
HBA1C BLD-MCNC: 5.6 % — SIGNIFICANT CHANGE UP (ref 4–5.6)
HCT VFR BLD CALC: 31.9 % — LOW (ref 34.5–45)
HCV AB S/CO SERPL IA: 0.05 S/CO — SIGNIFICANT CHANGE UP (ref 0–0.99)
HCV AB SERPL-IMP: SIGNIFICANT CHANGE UP
HDLC SERPL-MCNC: 50 MG/DL — SIGNIFICANT CHANGE UP (ref 45–65)
HGB BLD-MCNC: 9.6 G/DL — LOW (ref 11.5–15.5)
LIPID PNL WITH DIRECT LDL SERPL: 195 MG/DL — SIGNIFICANT CHANGE UP
MCHC RBC-ENTMCNC: 24.4 PG — LOW (ref 27–34)
MCHC RBC-ENTMCNC: 30.1 % — LOW (ref 32–36)
MCV RBC AUTO: 81.2 FL — SIGNIFICANT CHANGE UP (ref 80–100)
NRBC # FLD: 0 K/UL — SIGNIFICANT CHANGE UP (ref 0–0)
PLATELET # BLD AUTO: 256 K/UL — SIGNIFICANT CHANGE UP (ref 150–400)
PMV BLD: 11.3 FL — SIGNIFICANT CHANGE UP (ref 7–13)
RBC # BLD: 3.93 M/UL — SIGNIFICANT CHANGE UP (ref 3.8–5.2)
RBC # FLD: 14.8 % — HIGH (ref 10.3–14.5)
SPECIMEN SOURCE: SIGNIFICANT CHANGE UP
SPECIMEN SOURCE: SIGNIFICANT CHANGE UP
T3 SERPL-MCNC: 75.5 NG/DL — LOW (ref 80–200)
T4 AB SER-ACNC: 5.38 UG/DL — SIGNIFICANT CHANGE UP (ref 5.1–13)
TRIGL SERPL-MCNC: 107 MG/DL — SIGNIFICANT CHANGE UP (ref 10–149)
TSH SERPL-MCNC: 2.01 UIU/ML — SIGNIFICANT CHANGE UP (ref 0.27–4.2)
WBC # BLD: 8.64 K/UL — SIGNIFICANT CHANGE UP (ref 3.8–10.5)
WBC # FLD AUTO: 8.64 K/UL — SIGNIFICANT CHANGE UP (ref 3.8–10.5)

## 2019-06-06 PROCEDURE — 93306 TTE W/DOPPLER COMPLETE: CPT | Mod: 26

## 2019-06-06 RX ORDER — SODIUM CHLORIDE 9 MG/ML
1000 INJECTION INTRAMUSCULAR; INTRAVENOUS; SUBCUTANEOUS
Refills: 0 | Status: DISCONTINUED | OUTPATIENT
Start: 2019-06-06 | End: 2019-06-07

## 2019-06-06 RX ORDER — ONDANSETRON 8 MG/1
4 TABLET, FILM COATED ORAL ONCE
Refills: 0 | Status: COMPLETED | OUTPATIENT
Start: 2019-06-06 | End: 2019-06-06

## 2019-06-06 RX ORDER — HYDROMORPHONE HYDROCHLORIDE 2 MG/ML
2 INJECTION INTRAMUSCULAR; INTRAVENOUS; SUBCUTANEOUS
Refills: 0 | Status: DISCONTINUED | OUTPATIENT
Start: 2019-06-06 | End: 2019-06-07

## 2019-06-06 RX ORDER — SODIUM CHLORIDE 9 MG/ML
1000 INJECTION INTRAMUSCULAR; INTRAVENOUS; SUBCUTANEOUS
Refills: 0 | Status: DISCONTINUED | OUTPATIENT
Start: 2019-06-06 | End: 2019-06-06

## 2019-06-06 RX ADMIN — ONDANSETRON 4 MILLIGRAM(S): 8 TABLET, FILM COATED ORAL at 09:11

## 2019-06-06 RX ADMIN — LAMOTRIGINE 175 MILLIGRAM(S): 25 TABLET, ORALLY DISINTEGRATING ORAL at 22:10

## 2019-06-06 RX ADMIN — SODIUM CHLORIDE 75 MILLILITER(S): 9 INJECTION INTRAMUSCULAR; INTRAVENOUS; SUBCUTANEOUS at 17:09

## 2019-06-06 RX ADMIN — URSODIOL 300 MILLIGRAM(S): 250 TABLET, FILM COATED ORAL at 17:08

## 2019-06-06 RX ADMIN — Medication 200 MILLIGRAM(S): at 05:49

## 2019-06-06 RX ADMIN — URSODIOL 300 MILLIGRAM(S): 250 TABLET, FILM COATED ORAL at 05:49

## 2019-06-06 RX ADMIN — Medication 75 MICROGRAM(S): at 05:49

## 2019-06-06 RX ADMIN — CEFTRIAXONE 100 GRAM(S): 500 INJECTION, POWDER, FOR SOLUTION INTRAMUSCULAR; INTRAVENOUS at 12:56

## 2019-06-06 RX ADMIN — SODIUM CHLORIDE 75 MILLILITER(S): 9 INJECTION INTRAMUSCULAR; INTRAVENOUS; SUBCUTANEOUS at 11:15

## 2019-06-06 RX ADMIN — Medication 200 MILLIGRAM(S): at 17:08

## 2019-06-06 RX ADMIN — HEPARIN SODIUM 5000 UNIT(S): 5000 INJECTION INTRAVENOUS; SUBCUTANEOUS at 05:45

## 2019-06-06 NOTE — PROVIDER CONTACT NOTE (OTHER) - BACKGROUND
patient has been positive orthostatics, has been lethargic on and off throughout day, says shes just sleepy and tired.

## 2019-06-06 NOTE — PROGRESS NOTE ADULT - SUBJECTIVE AND OBJECTIVE BOX
Patient is a 58y old  Female who presents with a chief complaint of Fall with possible syncope (05 Jun 2019 20:04)      INTERVAL HPI/OVERNIGHT EVENTS:    MEDICATIONS  (STANDING):  cefTRIAXone   IVPB 1 Gram(s) IV Intermittent every 24 hours  diltiazem    milliGRAM(s) Oral daily  heparin  Injectable 5000 Unit(s) SubCutaneous every 12 hours  hydroxychloroquine 200 milliGRAM(s) Oral two times a day  lamoTRIgine 175 milliGRAM(s) Oral at bedtime  levothyroxine 75 MICROGram(s) Oral <User Schedule>  ursodiol Capsule 300 milliGRAM(s) Oral two times a day    MEDICATIONS  (PRN):  ALBUTerol/ipratropium for Nebulization 3 milliLiter(s) Nebulizer every 6 hours PRN Shortness of Breath and/or Wheezing  LORazepam     Tablet 1 milliGRAM(s) Oral two times a day PRN Anxiety          aspirin (Unknown)  Levaquin (Anaphylaxis)  penicillin (Unknown)    Intolerances        REVIEW OF SYSTEMS:  CARDIOVASCULAR: No chest pain, palpitations, dizziness, or leg swelling; no shortness of breath     RESPIRATORY: No cough, wheezing, chills or hemoptysis; No shortness of breath    GASTROINTESTINAL: No abdominal or epigastric pain. No nausea, vomiting, or hematemesis; No diarrhea or constipation. No melena or hematochezia.    NEUROLOGICAL: No headaches, memory loss, loss of strength, numbness      PHYSICAL EXAM:  Vital Signs Last 24 Hrs  T(C): 36.7 (06 Jun 2019 05:43), Max: 37.1 (05 Jun 2019 13:19)  T(F): 98 (06 Jun 2019 05:43), Max: 98.7 (05 Jun 2019 13:19)  HR: 89 (06 Jun 2019 09:06) (75 - 89)  BP: 119/60 (06 Jun 2019 09:06) (101/45 - 136/75)  BP(mean): --  RR: 18 (06 Jun 2019 09:06) (16 - 18)  SpO2: 100% (06 Jun 2019 09:06) (98% - 100%)    GENERAL: NAD, well-groomed, well-developed  HEAD:  Atraumatic, Normocephalic  EYES: EOMI, PERRLA, conjunctiva and sclera clear  NECK: Supple, No JVD, Normal thyroid  NERVOUS SYSTEM:  Alert & Oriented X3, Good concentration;  and symmetric  CHEST/LUNG: Clear to auscultation bilaterally; No rales, rhonchi, wheezing, or rubs  HEART: S4S1S2 regular, without murmur, rub   ABDOMEN: Soft, Nontender, Nondistended; Bowel sounds present  EXTREMITIES:  2+ Peripheral Pulses, No clubbing, cyanosis, or edema      LABS:                        9.6    8.64  )-----------( 256      ( 06 Jun 2019 06:00 )             31.9       Ca    9.4        05 Jun 2019 08:50        CAPILLARY BLOOD GLUCOSE          TELEMETRY: NSR    < from: 12 Lead ECG (06.05.19 @ 08:02) >  Diagnosis Line Normal sinus rhythm  Cannot rule out Anterior infarct , age undetermined  Abnormal ECG    < end of copied text >        assessment:  syncope.       Plan:   for echo.  Monitor.  CT head unchanged.  Nuclear stress test given ECG changes.   Pain management.   If bp controlled, D/C Diltizam. Patient is a 58y old  Female who presents with a chief complaint of Fall with possible syncope (05 Jun 2019 20:04)      INTERVAL HPI/OVERNIGHT EVENTS:    MEDICATIONS  (STANDING):  cefTRIAXone   IVPB 1 Gram(s) IV Intermittent every 24 hours  diltiazem    milliGRAM(s) Oral daily  heparin  Injectable 5000 Unit(s) SubCutaneous every 12 hours  hydroxychloroquine 200 milliGRAM(s) Oral two times a day  lamoTRIgine 175 milliGRAM(s) Oral at bedtime  levothyroxine 75 MICROGram(s) Oral <User Schedule>  ursodiol Capsule 300 milliGRAM(s) Oral two times a day    MEDICATIONS  (PRN):  ALBUTerol/ipratropium for Nebulization 3 milliLiter(s) Nebulizer every 6 hours PRN Shortness of Breath and/or Wheezing  LORazepam     Tablet 1 milliGRAM(s) Oral two times a day PRN Anxiety          aspirin (Unknown)  Levaquin (Anaphylaxis)  penicillin (Unknown)    Intolerances        REVIEW OF SYSTEMS:  CARDIOVASCULAR: No chest pain, palpitations, dizziness, or leg swelling; no shortness of breath     RESPIRATORY: No cough, wheezing, chills or hemoptysis; No shortness of breath    GASTROINTESTINAL: No abdominal or epigastric pain. No nausea, vomiting, or hematemesis; No diarrhea or constipation. No melena or hematochezia.    NEUROLOGICAL: No headaches, memory loss, loss of strength, numbness      PHYSICAL EXAM:  Vital Signs Last 24 Hrs  T(C): 36.7 (06 Jun 2019 05:43), Max: 37.1 (05 Jun 2019 13:19)  T(F): 98 (06 Jun 2019 05:43), Max: 98.7 (05 Jun 2019 13:19)  HR: 89 (06 Jun 2019 09:06) (75 - 89)  BP: 119/60 (06 Jun 2019 09:06) (101/45 - 136/75)  BP(mean): --  RR: 18 (06 Jun 2019 09:06) (16 - 18)  SpO2: 100% (06 Jun 2019 09:06) (98% - 100%)    GENERAL: NAD, well-groomed, well-developed  HEAD:  Atraumatic, Normocephalic  EYES: EOMI, PERRLA, conjunctiva and sclera clear  NECK: Supple, No JVD, Normal thyroid  NERVOUS SYSTEM:  Alert & Oriented X3, Good concentration;  and symmetric  CHEST/LUNG: Clear to auscultation bilaterally; No rales, rhonchi, wheezing, or rubs  HEART: S4S1S2 regular, without murmur, rub   ABDOMEN: Soft, Nontender, Nondistended; Bowel sounds present  EXTREMITIES:  2+ Peripheral Pulses, No clubbing, cyanosis, or edema      LABS:                        9.6    8.64  )-----------( 256      ( 06 Jun 2019 06:00 )             31.9       Ca    9.4        05 Jun 2019 08:50        CAPILLARY BLOOD GLUCOSE          TELEMETRY: NSR    < from: 12 Lead ECG (06.05.19 @ 08:02) >  Diagnosis Line Normal sinus rhythm  Cannot rule out Anterior infarct , age undetermined  Abnormal ECG    < end of copied text >        assessment:  syncope.   UTI     Plan:   for echo.  Monitor.  CT head unchanged.  Nuclear stress test given ECG changes.   Pain management.   If bp controlled, D/C Diltizam.   continue ceftriaxone; check culture

## 2019-06-06 NOTE — CHART NOTE - NSCHARTNOTEFT_GEN_A_CORE
Requested to consult over the phone on this patient for pain management.        GENERAL: n/a         Impression/Plan: Requested by Tele PA to help manage pain. In discussion with tele PA patient is not complaining of pain at this time but has been taking 4mg of dilaudid on average twice a day for her autoimmune skin pain and joint pain. Concerns were that since she was drowsy prior that her pain medications may make her even more drowsy. Recommending to continue home dose of dilaudid 4mg BID as needed for pain, if patient is drowsy or not alert may decrease dose to 2mg BID. Consider adding tylenol 650mg q6hrs standing for 2 days and a NSAID of choice q8hrs standing for 2 days. If patient is taking ativan then patient should be on continuos pulse ox to monitor O2 sat. May call Pain Service if further assistance needed.

## 2019-06-06 NOTE — CHART NOTE - NSCHARTNOTEFT_GEN_A_CORE
Patient noted to be somnolent during rounds this am around 11am. Patient arousable to painful stimuli but continuously dozing off mid conversation. RN noted that patient was seen talking and awake and alert in no distress prior to this event.   VS: 100.3, 84, 110/64, R17, 95% RA , no events on tele NSR. Patient does not endorse any discomfort denies CP, dizziness, lightheadedness, N/V, abdominal Pain but states that she is just tired.   +orthostatics which patient was started on IVF prior to this events   GENERAL: NAD, well-groomed, well-developed  EYES: EOMI, pupils slow to react to light, conjunctiva and sclera clear  CHEST/LUNG: Clear to auscultation bilaterally; No rales, rhonchi, wheezing  HEART: RRR S1S2 regular  ABDOMEN: Soft, Nontender, Nondistended; Bowel sounds present  EXTREMITIES: no edema   Neuro: Somnolent, unable to participate in exam, moving all limbs    Labs: Tox screen significant for opiates    Patient was questioned if she had taken any of her own pain medications since Dilaudid in hospital was held secondary to her syncopal episode on admission. Patient denied taking her own medications but admitted to having them in her bag, Medications were confiscated from patient and sent to pharmacy to verify meds since they were not in there bottles.     As per RN patient awake and alert later on and helped to bathroom and sent for Echo around 2pm.     Patient now again noted to be somnolent. VSS: 98.7, 82, 108/60, R18, 99% RA. Patient arousable to tactile and verbal stimuli. Head CT obtained on admission which was WNL, BCx/UCx: negative, On IV ceftriaxone for UTI.   D/W Dr. Melara will obtain a formal neuro c/s in am, consider EEG ? postictal, MRI H. Pain c/s obtained and Dilaudid dose decreased to 2mg BID since patient has been on opiates for years and can go into withdrawal without it, although currently held for somnolence. Will continue to monitor.

## 2019-06-07 ENCOUNTER — TRANSCRIPTION ENCOUNTER (OUTPATIENT)
Age: 59
End: 2019-06-07

## 2019-06-07 VITALS
DIASTOLIC BLOOD PRESSURE: 72 MMHG | SYSTOLIC BLOOD PRESSURE: 124 MMHG | TEMPERATURE: 98 F | OXYGEN SATURATION: 100 % | RESPIRATION RATE: 16 BRPM | HEART RATE: 83 BPM

## 2019-06-07 LAB
ANION GAP SERPL CALC-SCNC: 15 MMO/L — HIGH (ref 7–14)
BUN SERPL-MCNC: 10 MG/DL — SIGNIFICANT CHANGE UP (ref 7–23)
CALCIUM SERPL-MCNC: 9.5 MG/DL — SIGNIFICANT CHANGE UP (ref 8.4–10.5)
CHLORIDE SERPL-SCNC: 107 MMOL/L — SIGNIFICANT CHANGE UP (ref 98–107)
CO2 SERPL-SCNC: 19 MMOL/L — LOW (ref 22–31)
CREAT SERPL-MCNC: 0.64 MG/DL — SIGNIFICANT CHANGE UP (ref 0.5–1.3)
GLUCOSE SERPL-MCNC: 94 MG/DL — SIGNIFICANT CHANGE UP (ref 70–99)
HCT VFR BLD CALC: 35.3 % — SIGNIFICANT CHANGE UP (ref 34.5–45)
HGB BLD-MCNC: 10.6 G/DL — LOW (ref 11.5–15.5)
MAGNESIUM SERPL-MCNC: 2.3 MG/DL — SIGNIFICANT CHANGE UP (ref 1.6–2.6)
MCHC RBC-ENTMCNC: 24.1 PG — LOW (ref 27–34)
MCHC RBC-ENTMCNC: 30 % — LOW (ref 32–36)
MCV RBC AUTO: 80.4 FL — SIGNIFICANT CHANGE UP (ref 80–100)
NRBC # FLD: 0 K/UL — SIGNIFICANT CHANGE UP (ref 0–0)
PHOSPHATE SERPL-MCNC: 2.5 MG/DL — SIGNIFICANT CHANGE UP (ref 2.5–4.5)
PLATELET # BLD AUTO: 315 K/UL — SIGNIFICANT CHANGE UP (ref 150–400)
PMV BLD: 10.3 FL — SIGNIFICANT CHANGE UP (ref 7–13)
POTASSIUM SERPL-MCNC: 3.7 MMOL/L — SIGNIFICANT CHANGE UP (ref 3.5–5.3)
POTASSIUM SERPL-SCNC: 3.7 MMOL/L — SIGNIFICANT CHANGE UP (ref 3.5–5.3)
RBC # BLD: 4.39 M/UL — SIGNIFICANT CHANGE UP (ref 3.8–5.2)
RBC # FLD: 14.8 % — HIGH (ref 10.3–14.5)
SODIUM SERPL-SCNC: 141 MMOL/L — SIGNIFICANT CHANGE UP (ref 135–145)
WBC # BLD: 8.94 K/UL — SIGNIFICANT CHANGE UP (ref 3.8–10.5)
WBC # FLD AUTO: 8.94 K/UL — SIGNIFICANT CHANGE UP (ref 3.8–10.5)

## 2019-06-07 PROCEDURE — 78452 HT MUSCLE IMAGE SPECT MULT: CPT | Mod: 26

## 2019-06-07 PROCEDURE — 93016 CV STRESS TEST SUPVJ ONLY: CPT | Mod: GC

## 2019-06-07 PROCEDURE — 93018 CV STRESS TEST I&R ONLY: CPT | Mod: GC

## 2019-06-07 RX ORDER — ACETAMINOPHEN 500 MG
650 TABLET ORAL ONCE
Refills: 0 | Status: COMPLETED | OUTPATIENT
Start: 2019-06-07 | End: 2019-06-07

## 2019-06-07 RX ORDER — ONDANSETRON 8 MG/1
4 TABLET, FILM COATED ORAL ONCE
Refills: 0 | Status: COMPLETED | OUTPATIENT
Start: 2019-06-07 | End: 2019-06-07

## 2019-06-07 RX ORDER — DIPHENHYDRAMINE HCL 50 MG
1 CAPSULE ORAL
Qty: 0 | Refills: 0 | DISCHARGE

## 2019-06-07 RX ORDER — ONDANSETRON 8 MG/1
4 TABLET, FILM COATED ORAL ONCE
Refills: 0 | Status: DISCONTINUED | OUTPATIENT
Start: 2019-06-07 | End: 2019-06-07

## 2019-06-07 RX ORDER — AZTREONAM 2 G
1 VIAL (EA) INJECTION
Qty: 5 | Refills: 0
Start: 2019-06-07 | End: 2019-06-11

## 2019-06-07 RX ORDER — DILTIAZEM HCL 120 MG
1 CAPSULE, EXT RELEASE 24 HR ORAL
Qty: 0 | Refills: 0 | DISCHARGE

## 2019-06-07 RX ADMIN — Medication 650 MILLIGRAM(S): at 02:30

## 2019-06-07 RX ADMIN — Medication 650 MILLIGRAM(S): at 01:36

## 2019-06-07 RX ADMIN — HEPARIN SODIUM 5000 UNIT(S): 5000 INJECTION INTRAVENOUS; SUBCUTANEOUS at 05:59

## 2019-06-07 RX ADMIN — Medication 75 MICROGRAM(S): at 06:00

## 2019-06-07 RX ADMIN — Medication 200 MILLIGRAM(S): at 05:59

## 2019-06-07 RX ADMIN — HYDROMORPHONE HYDROCHLORIDE 2 MILLIGRAM(S): 2 INJECTION INTRAMUSCULAR; INTRAVENOUS; SUBCUTANEOUS at 05:59

## 2019-06-07 RX ADMIN — CEFTRIAXONE 100 GRAM(S): 500 INJECTION, POWDER, FOR SOLUTION INTRAMUSCULAR; INTRAVENOUS at 12:24

## 2019-06-07 RX ADMIN — HYDROMORPHONE HYDROCHLORIDE 2 MILLIGRAM(S): 2 INJECTION INTRAMUSCULAR; INTRAVENOUS; SUBCUTANEOUS at 06:59

## 2019-06-07 RX ADMIN — URSODIOL 300 MILLIGRAM(S): 250 TABLET, FILM COATED ORAL at 06:00

## 2019-06-07 RX ADMIN — ONDANSETRON 4 MILLIGRAM(S): 8 TABLET, FILM COATED ORAL at 15:23

## 2019-06-07 NOTE — CONSULT NOTE ADULT - ASSESSMENT
note is incomplete 57 y/o F with PMH of Sjogren's, Raynaud's, Primary Biliary sclerosis, Anemia, Asthma presented to the ED for fall with possible syncope today. Neuro exam unremarkable.     Impression     possible syncope however seizure is not ruled out as patient had LOC for unknown time     Plan     outpatient EEG/MRI brain   follow outpatient with Dr.Nissenbaum

## 2019-06-07 NOTE — DISCHARGE NOTE PROVIDER - HOSPITAL COURSE
57 y/o female with PMHx of Sjogren's, Raynaud's, Primary Biliary sclerosis, Anemia, Asthma who presented to the ED with possible syncope today.         1. Fall: Troponins negative x 2. Underwent echo. Results: 1. Normal mitral valve. Minimal mitral regurgitation. 2. Normal left ventricular internal dimensions and wall thicknesses. 3. Endocardium not well visualized; grossly normal left ventricular systolic function. 4. The right ventricle is not well visualized; grossly normal right ventricular systolic function.    Orthostatics were initially positive. Treated with IVF and have resolved. Head CT was negative for acute hemorrhage.     Was seen by neurology during this admission for possible post-ictal episode on 6/6/19. Spoke to neurology resident on the floor. No need to start anti-epileptic medication, other than Lamictal which patient takes at home for pain management.     Patient also underwent NST. Results: There is a medium sized, moderate defect in the anterior wall that corrects with prone imaging, suggestive of anterior chest wall attenuation artifact.        2. UTI: Treated with Ceftriaxone. Urine cultures negative at 24 hours. BCx negative at 24 hours. Will transition to oral therapy upon discharge.         3. Pain management: Recommended to continue home dose of dilaudid 4mg BID as needed for pain. Will follow up as an outpatient with pain management doctor.         Patient seen and evaluated, no acute somatic complaints. Medically cleared/stable for discharge as per Dr. Melara with close outpatient follow up within 1-2 weeks of discharge. Patient understands and agrees with plan of care.

## 2019-06-07 NOTE — DISCHARGE NOTE PROVIDER - NSDCCPCAREPLAN_GEN_ALL_CORE_FT
PRINCIPAL DISCHARGE DIAGNOSIS  Diagnosis: Syncope and collapse  Assessment and Plan of Treatment: Admitted after fall for syncope. Troponins negative x 2. Underwent echo. Results: 1. Normal mitral valve. Minimal mitral regurgitation. 2. Normal left ventricular internal dimensions and wall thicknesses. 3. Endocardium not well visualized; grossly normal left ventricular systolic function. 4. The right ventricle is not well visualized; grossly normal right ventricular systolic function.  Orthostatics were initially positive. Treated with IVF and have resolved. Head CT was negative for acute hemorrhage.   Was seen by neurology during this admission for possible post-ictal episode on 6/6/19. Spoke to neurology resident on the floor. No need to start anti-epileptic medication, other than Lamictal which patient takes at home for pain management.   Patient also underwent NST. Results: There is a medium sized, moderate defect in the anterior wall that corrects with prone imaging, suggestive of anterior chest wall attenuation artifact.      SECONDARY DISCHARGE DIAGNOSES  Diagnosis: Pain management  Assessment and Plan of Treatment: Recommended to continue home dose of dilaudid 4mg BID as needed for pain. Will follow up as an outpatient with pain management doctor.    Diagnosis: Urinary tract infection  Assessment and Plan of Treatment: Treated with Ceftriaxone. Urine cultures negative at 24 hours. BCx negative at 24 hours. Will transition to oral therapy upon discharge.

## 2019-06-07 NOTE — DISCHARGE NOTE PROVIDER - CARE PROVIDERS DIRECT ADDRESSES
,[email or web address]

## 2019-06-07 NOTE — PROGRESS NOTE ADULT - SUBJECTIVE AND OBJECTIVE BOX
Patient is a 58y old  Female who presents with a chief complaint of Fall with possible syncope (06 Jun 2019 09:11)      INTERVAL HPI/OVERNIGHT EVENTS: see event note     MEDICATIONS  (STANDING):  cefTRIAXone   IVPB 1 Gram(s) IV Intermittent every 24 hours  heparin  Injectable 5000 Unit(s) SubCutaneous every 12 hours  HYDROmorphone   Tablet 2 milliGRAM(s) Oral two times a day  hydroxychloroquine 200 milliGRAM(s) Oral two times a day  lamoTRIgine 175 milliGRAM(s) Oral at bedtime  levothyroxine 75 MICROGram(s) Oral <User Schedule>  sodium chloride 0.9%. 1000 milliLiter(s) (75 mL/Hr) IV Continuous <Continuous>  ursodiol Capsule 300 milliGRAM(s) Oral two times a day    MEDICATIONS  (PRN):  ALBUTerol/ipratropium for Nebulization 3 milliLiter(s) Nebulizer every 6 hours PRN Shortness of Breath and/or Wheezing  LORazepam     Tablet 1 milliGRAM(s) Oral two times a day PRN Anxiety        Allergies    aspirin (Unknown)  Levaquin (Anaphylaxis)  penicillin (Unknown)    Intolerances        REVIEW OF SYSTEMS:  CARDIOVASCULAR: No chest pain, palpitations, dizziness, or leg swelling; no shortness of breath     RESPIRATORY: No cough, wheezing, chills or hemoptysis; No shortness of breath    GASTROINTESTINAL: No abdominal or epigastric pain. No nausea, vomiting, or hematemesis; No diarrhea or constipation. No melena or hematochezia.    NEUROLOGICAL: No headaches, memory loss, loss of strength, numbness      PHYSICAL EXAM:  Vital Signs Last 24 Hrs  T(C): 36.9 (07 Jun 2019 05:50), Max: 37.9 (06 Jun 2019 11:25)  T(F): 98.4 (07 Jun 2019 05:50), Max: 100.3 (06 Jun 2019 11:25)  HR: 83 (07 Jun 2019 05:50) (75 - 91)  BP: 124/72 (07 Jun 2019 05:50) (96/66 - 124/72)  BP(mean): --  RR: 16 (07 Jun 2019 05:50) (16 - 18)  SpO2: 100% (07 Jun 2019 05:50) (95% - 100%)    GENERAL: NAD, well-groomed, well-developed  HEAD:  Atraumatic, Normocephalic  EYES: EOMI, PERRLA, conjunctiva and sclera clear  NECK: Supple, No JVD, Normal thyroid  NERVOUS SYSTEM:  Alert & Oriented X3, Good concentration;  and symmetric  CHEST/LUNG: Clear to auscultation bilaterally; No rales, rhonchi, wheezing, or rubs  HEART: S1S2 regular, without murmur, rub nor gallop  ABDOMEN: Soft, Nontender, Nondistended; Bowel sounds present  EXTREMITIES:  2+ Peripheral Pulses, No clubbing, cyanosis, or edema      LABS:                        10.6   8.94  )-----------( 315      ( 07 Jun 2019 08:05 )             35.3     07 Jun 2019 08:05    141    |  107    |  10     ----------------------------<  94     3.7     |  19     |  0.64     Ca    9.5        07 Jun 2019 08:05  Phos  2.5       07 Jun 2019 08:05  Mg     2.3       07 Jun 2019 08:05        CAPILLARY BLOOD GLUCOSE      POCT Blood Glucose.: 101 mg/dL (06 Jun 2019 11:09)      TELEMETRY: NSR      assessment:  pt stable.   was orthostatic yesterday.   Check orthostatics, after fluids and Discontinuation of Diltizam (was on this med for skin condition- ?Choban's)  Neuro eval, consider EEG.    For stress today.     Care Discussed with Consultants/Other Providers: PA

## 2019-06-07 NOTE — DISCHARGE NOTE NURSING/CASE MANAGEMENT/SOCIAL WORK - NSDCDPATPORTLINK_GEN_ALL_CORE
You can access the [facility] Patient Portal, offered by [facility], by registering with the following website: [email or web address]

## 2019-06-07 NOTE — CONSULT NOTE ADULT - SUBJECTIVE AND OBJECTIVE BOX
HPI:    59 y/o F with PMH of Sjogren's, Raynaud's, Primary Biliary sclerosis, Anemia, Asthma presented to the ED for fall with possible syncope today. As per the patient for the past few days she has been experiencing generalized weakness and fatigue. Patient stated that she was walking to the bathroom this morning when she thinks she had lightheadedness or dizziness and then fell on the floor hitting her head. Patient is unsure how long she is was on the floor or if she had LOC and who helped her up. Patient stated that since her fall she is having visual hallucinations(where she is arguing with people who are not there). Patient also endorsed of dysuria and frequency and was admitted last month for UTI. Patient also endorsed of headache after the fall. Patient endorsed of ANGUIANO and stated that when she is climbing a flight of stairs she would get tired and winded and would have to stop to catch her breath. Patient also endorsed of intermittent chest heaviness.     neurology was consulted for seizure workup.         MEDICATIONS  (STANDING):  cefTRIAXone   IVPB 1 Gram(s) IV Intermittent every 24 hours  heparin  Injectable 5000 Unit(s) SubCutaneous every 12 hours  HYDROmorphone   Tablet 2 milliGRAM(s) Oral two times a day  hydroxychloroquine 200 milliGRAM(s) Oral two times a day  lamoTRIgine 175 milliGRAM(s) Oral at bedtime  levothyroxine 75 MICROGram(s) Oral <User Schedule>  ondansetron Injectable 4 milliGRAM(s) IV Push once  sodium chloride 0.9%. 1000 milliLiter(s) (75 mL/Hr) IV Continuous <Continuous>  ursodiol Capsule 300 milliGRAM(s) Oral two times a day    MEDICATIONS  (PRN):  ALBUTerol/ipratropium for Nebulization 3 milliLiter(s) Nebulizer every 6 hours PRN Shortness of Breath and/or Wheezing  LORazepam     Tablet 1 milliGRAM(s) Oral two times a day PRN Anxiety      PAST MEDICAL & SURGICAL HISTORY:  Autoimmune skin disease  Neuropathy  Hypothyroid  Sjogren's syndrome  Primary biliary cirrhosis  Asthma  S/P appendectomy  S/P knee surgery  S/P tonsillectomy  S/P cholecystectomy      FAMILY HISTORY:  No pertinent family history in first degree relatives      Allergies    aspirin (Unknown)  Levaquin (Anaphylaxis)  penicillin (Unknown)    Intolerances          SHx - No smoking, No ETOH, No drug abuse      Review of Systems:  CONSTITUTIONAL:  No weight loss, fever, chills, weakness or fatigue.  HEENT:  Eyes:  No visual loss, blurred vision, double vision or yellow sclerae. Ears, Nose, Throat:  No hearing loss, sneezing, congestion, runny nose or sore throat.  SKIN:  No rash or itching.  CARDIOVASCULAR:  No chest pain, chest pressure or chest discomfort. No palpitations or edema.  RESPIRATORY:  No shortness of breath, cough or sputum.  GASTROINTESTINAL:  No anorexia, nausea, vomiting or diarrhea. No abdominal pain or blood.  GENITOURINARY:  NO Burning on urination.   NEUROLOGICAL: See HPI  MUSCULOSKELETAL:  No muscle, back pain, joint pain or stiffness.  HEMATOLOGIC:  No anemia, bleeding or bruising.  LYMPHATICS:  No enlarged nodes. No history of splenectomy.  PSYCHIATRIC:  No history of depression or anxiety.  ENDOCRINOLOGIC:  No reports of sweating, cold or heat intolerance. No polyuria or polydipsia.  ALLERGIES:  No history of asthma, hives, eczema or rhinitis.        Vital Signs Last 24 Hrs  T(C): 36.9 (07 Jun 2019 05:50), Max: 37.1 (06 Jun 2019 17:04)  T(F): 98.4 (07 Jun 2019 05:50), Max: 98.7 (06 Jun 2019 17:04)  HR: 83 (07 Jun 2019 05:50) (75 - 83)  BP: 124/72 (07 Jun 2019 05:50) (108/60 - 124/72)  BP(mean): --  RR: 16 (07 Jun 2019 05:50) (16 - 18)  SpO2: 100% (07 Jun 2019 05:50) (99% - 100%)                   Neurological Exam:        Other:    06-07    141  |  107  |  10  ----------------------------<  94  3.7   |  19<L>  |  0.64    Ca    9.5      07 Jun 2019 08:05  Phos  2.5     06-07  Mg     2.3     06-07 06-07    141  |  107  |  10  ----------------------------<  94  3.7   |  19<L>  |  0.64    Ca    9.5      07 Jun 2019 08:05  Phos  2.5     06-07  Mg     2.3     06-07                            10.6   8.94  )-----------( 315      ( 07 Jun 2019 08:05 )             35.3       Radiology    < from: CT Head No Cont (06.05.19 @ 09:19) >  IMPRESSION:  No acute intracranial hemorrhage.  No interval change.  Microvascular disease.      < end of copied text > HPI:    59 y/o F with PMH of Sjogren's, Raynaud's, Primary Biliary sclerosis, Anemia, Asthma presented to the ED for fall with possible syncope today. As per the patient for the past few days she has been experiencing generalized weakness and fatigue. Patient stated that she was walking to the bathroom this morning when she thinks she had lightheadedness or dizziness and then fell on the floor hitting her head. Patient is unsure how long she is was on the floor or if she had LOC and who helped her up. Patient stated that since her fall she is having visual hallucinations(where she is arguing with people who are not there). Patient also endorsed of dysuria and frequency and was admitted last month for UTI. Patient also endorsed of headache after the fall. Patient endorsed of ANGUIANO and stated that when she is climbing a flight of stairs she would get tired and winded and would have to stop to catch her breath. Patient also endorsed of intermittent chest heaviness.     neurology was consulted for seizure workup.         MEDICATIONS  (STANDING):  cefTRIAXone   IVPB 1 Gram(s) IV Intermittent every 24 hours  heparin  Injectable 5000 Unit(s) SubCutaneous every 12 hours  HYDROmorphone   Tablet 2 milliGRAM(s) Oral two times a day  hydroxychloroquine 200 milliGRAM(s) Oral two times a day  lamoTRIgine 175 milliGRAM(s) Oral at bedtime  levothyroxine 75 MICROGram(s) Oral <User Schedule>  ondansetron Injectable 4 milliGRAM(s) IV Push once  sodium chloride 0.9%. 1000 milliLiter(s) (75 mL/Hr) IV Continuous <Continuous>  ursodiol Capsule 300 milliGRAM(s) Oral two times a day    MEDICATIONS  (PRN):  ALBUTerol/ipratropium for Nebulization 3 milliLiter(s) Nebulizer every 6 hours PRN Shortness of Breath and/or Wheezing  LORazepam     Tablet 1 milliGRAM(s) Oral two times a day PRN Anxiety      PAST MEDICAL & SURGICAL HISTORY:  Autoimmune skin disease  Neuropathy  Hypothyroid  Sjogren's syndrome  Primary biliary cirrhosis  Asthma  S/P appendectomy  S/P knee surgery  S/P tonsillectomy  S/P cholecystectomy      FAMILY HISTORY:  No pertinent family history in first degree relatives      Allergies    aspirin (Unknown)  Levaquin (Anaphylaxis)  penicillin (Unknown)    Intolerances          SHx - No smoking, No ETOH, No drug abuse      Review of Systems:  CONSTITUTIONAL:  No weight loss, fever, chills, weakness or fatigue.  HEENT:  Eyes:  No visual loss, blurred vision, double vision or yellow sclerae. Ears, Nose, Throat:  No hearing loss, sneezing, congestion, runny nose or sore throat.  SKIN:  No rash or itching.  CARDIOVASCULAR:  No chest pain, chest pressure or chest discomfort. No palpitations or edema.  RESPIRATORY:  No shortness of breath, cough or sputum.  GASTROINTESTINAL:  No anorexia, nausea, vomiting or diarrhea. No abdominal pain or blood.  GENITOURINARY:  NO Burning on urination.   NEUROLOGICAL: See HPI  MUSCULOSKELETAL:  No muscle, back pain, joint pain or stiffness.  HEMATOLOGIC:  No anemia, bleeding or bruising.  LYMPHATICS:  No enlarged nodes. No history of splenectomy.  PSYCHIATRIC:  No history of depression or anxiety.  ENDOCRINOLOGIC:  No reports of sweating, cold or heat intolerance. No polyuria or polydipsia.  ALLERGIES:  No history of asthma, hives, eczema or rhinitis.        Vital Signs Last 24 Hrs  T(C): 36.9 (07 Jun 2019 05:50), Max: 37.1 (06 Jun 2019 17:04)  T(F): 98.4 (07 Jun 2019 05:50), Max: 98.7 (06 Jun 2019 17:04)  HR: 83 (07 Jun 2019 05:50) (75 - 83)  BP: 124/72 (07 Jun 2019 05:50) (108/60 - 124/72)  BP(mean): --  RR: 16 (07 Jun 2019 05:50) (16 - 18)  SpO2: 100% (07 Jun 2019 05:50) (99% - 100%)    PHYSICAL EXAMINATION:  General: Well-developed, well nourished, in no acute distress.  Eyes: Conjunctiva and sclera clear.  Neurologic:  - Mental Status:  Alert, awake, oriented to person, place, and time; Speech is fluent with intact naming, repetition, and comprehension; Good overall fund of knowledge;   - Cranial Nerves II-XII:    II:  Visual fields are full to confrontation; Pupils are equal, round, and reactive to light;   III, IV, VI:  Extraocular movements are intact without nystagmus.  V:  Facial sensation is intact in the V1-V3 distribution bilaterally.  VII:  Face is symmetric with normal eye closure and smile  VIII:  Hearing is intact to finger rub.  IX, X:  Uvula is midline and soft palate rises symmetrically  XI:  Head turning and shoulder shrug are intact.  XII:  Tongue protudes in the midline.  - Motor:  Strength is 5/5 throughout.  There is no pronator drift.  Normal muscle bulk and tone throughout.  - Reflexes:  2+ and symmetric at the biceps, triceps, brachioradialis, knees, and ankles.  Plantar responses flexor.  - Sensory:  Intact throughout to vibration, and joint-position, light touch, pin prick.  - Coordination:  Finger-nose-finger and heel-knee-shin intact without dysmetria.  Rapid alternating hand movements intact.    Other:    06-07    141  |  107  |  10  ----------------------------<  94  3.7   |  19<L>  |  0.64    Ca    9.5      07 Jun 2019 08:05  Phos  2.5     06-07  Mg     2.3     06-07      06-07    141  |  107  |  10  ----------------------------<  94  3.7   |  19<L>  |  0.64    Ca    9.5      07 Jun 2019 08:05  Phos  2.5     06-07  Mg     2.3     06-07                            10.6   8.94  )-----------( 315      ( 07 Jun 2019 08:05 )             35.3       Radiology    < from: CT Head No Cont (06.05.19 @ 09:19) >  IMPRESSION:  No acute intracranial hemorrhage.  No interval change.  Microvascular disease.      < end of copied text >

## 2019-06-07 NOTE — DISCHARGE NOTE PROVIDER - CARE PROVIDER_API CALL
Nissenbaum, Michael A (MD)  Neurology  [address]  [place] [address]  Phone: [phone]  Fax: [phone]  Follow Up Time:     Peter Melara (DO)  Internal Medicine  [address] [place] [address]  Phone: [phone]  Fax: [phone]  Follow Up Time:

## 2019-06-07 NOTE — DISCHARGE NOTE PROVIDER - NSDCFUADDAPPT_GEN_ALL_CORE_FT
Follow up with PCP within 1-2 weeks of discharge.   Follow up with neurology within 1-2 weeks of discharge.

## 2019-06-10 LAB — BACTERIA BLD CULT: SIGNIFICANT CHANGE UP

## 2019-12-20 ENCOUNTER — APPOINTMENT (OUTPATIENT)
Dept: OTOLARYNGOLOGY | Facility: CLINIC | Age: 59
End: 2019-12-20
Payer: COMMERCIAL

## 2019-12-20 VITALS
TEMPERATURE: 98.3 F | DIASTOLIC BLOOD PRESSURE: 85 MMHG | BODY MASS INDEX: 28.22 KG/M2 | WEIGHT: 140 LBS | HEIGHT: 59 IN | SYSTOLIC BLOOD PRESSURE: 128 MMHG | HEART RATE: 104 BPM | OXYGEN SATURATION: 96 %

## 2019-12-20 PROCEDURE — 99203 OFFICE O/P NEW LOW 30 MIN: CPT

## 2019-12-20 RX ORDER — MUPIROCIN 20 MG/G
2 OINTMENT TOPICAL 3 TIMES DAILY
Qty: 1 | Refills: 2 | Status: ACTIVE | COMMUNITY
Start: 2019-12-20 | End: 1900-01-01

## 2019-12-20 RX ORDER — COLISTIN SULFATE, NEOMYCIN SULFATE, THONZONIUM BROMIDE AND HYDROCORTISONE ACETATE 3; 3.3; .5; 1 MG/ML; MG/ML; MG/ML; MG/ML
3.3-3-10-0.5 SUSPENSION AURICULAR (OTIC) 4 TIMES DAILY
Qty: 2 | Refills: 3 | Status: ACTIVE | COMMUNITY
Start: 2019-12-20 | End: 1900-01-01

## 2020-01-06 ENCOUNTER — APPOINTMENT (OUTPATIENT)
Dept: OTOLARYNGOLOGY | Facility: CLINIC | Age: 60
End: 2020-01-06

## 2020-01-06 LAB — BACTERIA WND CULT: ABNORMAL

## 2020-01-06 NOTE — PROCEDURE
[] : Removal of Cerumen [FreeTextEntry5] : post auricular rt wax rt\par CnS done\par refuses Abx Rx mupirocin and ear gtta \par cerumen Rt

## 2020-01-06 NOTE — CONSULT LETTER
[Dear  ___] : Dear  [unfilled], [Consult Letter:] : I had the pleasure of evaluating your patient, [unfilled]. [Please see my note below.] : Please see my note below. [Sincerely,] : Sincerely, [Consult Closing:] : Thank you very much for allowing me to participate in the care of this patient.  If you have any questions, please do not hesitate to contact me. [DrVance  ___] : Dr. CHA [FreeTextEntry3] : Jacques Hastings MD, FACS\par Professor of Otolaryngology, [employer] at [facility]\par Director, [facility], [facility]\par , [facility], [facility]\par

## 2020-01-06 NOTE — PHYSICAL EXAM
[Normal] : lingual tonsils are normal [Midline] : trachea located in midline position [de-identified] : post auricular swelling drainage and pain\par CnS done\par refuses Abx Rx mupirocin and ear gtta \par cerumen removed\par get CT t bone

## 2020-01-06 NOTE — HISTORY OF PRESENT ILLNESS
[de-identified] : post auricular swelling drainage and pain\par CnS done\par refuses Abx Rx mupirocin and ear gtta \par cerumen removed\par get CT t bone

## 2020-01-06 NOTE — REASON FOR VISIT
[Initial Evaluation] : an initial evaluation for [FreeTextEntry2] : sp Rt sided mastoidectomy Dr Boyce two years ago, 1 Month hx drainin post auricular area w swelling pain

## 2020-01-14 ENCOUNTER — APPOINTMENT (OUTPATIENT)
Dept: OTOLARYNGOLOGY | Facility: CLINIC | Age: 60
End: 2020-01-14

## 2020-03-09 ENCOUNTER — EMERGENCY (EMERGENCY)
Facility: HOSPITAL | Age: 60
LOS: 1 days | Discharge: HOME | End: 2020-03-09
Attending: EMERGENCY MEDICINE | Admitting: EMERGENCY MEDICINE
Payer: COMMERCIAL

## 2020-03-09 VITALS
OXYGEN SATURATION: 100 % | SYSTOLIC BLOOD PRESSURE: 116 MMHG | HEART RATE: 88 BPM | RESPIRATION RATE: 18 BRPM | DIASTOLIC BLOOD PRESSURE: 69 MMHG

## 2020-03-09 VITALS
HEART RATE: 115 BPM | SYSTOLIC BLOOD PRESSURE: 147 MMHG | DIASTOLIC BLOOD PRESSURE: 103 MMHG | OXYGEN SATURATION: 100 % | RESPIRATION RATE: 18 BRPM | TEMPERATURE: 99 F

## 2020-03-09 DIAGNOSIS — Z90.89 ACQUIRED ABSENCE OF OTHER ORGANS: Chronic | ICD-10-CM

## 2020-03-09 DIAGNOSIS — Z90.49 ACQUIRED ABSENCE OF OTHER SPECIFIED PARTS OF DIGESTIVE TRACT: Chronic | ICD-10-CM

## 2020-03-09 DIAGNOSIS — Z98.89 OTHER SPECIFIED POSTPROCEDURAL STATES: Chronic | ICD-10-CM

## 2020-03-09 LAB
ALBUMIN SERPL ELPH-MCNC: 4.3 G/DL — SIGNIFICANT CHANGE UP (ref 3.3–5)
ALP SERPL-CCNC: 80 U/L — SIGNIFICANT CHANGE UP (ref 40–120)
ALT FLD-CCNC: 17 U/L — SIGNIFICANT CHANGE UP (ref 4–33)
ANION GAP SERPL CALC-SCNC: 14 MMO/L — SIGNIFICANT CHANGE UP (ref 7–14)
AST SERPL-CCNC: 17 U/L — SIGNIFICANT CHANGE UP (ref 4–32)
BILIRUB SERPL-MCNC: < 0.2 MG/DL — LOW (ref 0.2–1.2)
BUN SERPL-MCNC: 16 MG/DL — SIGNIFICANT CHANGE UP (ref 7–23)
CALCIUM SERPL-MCNC: 9.6 MG/DL — SIGNIFICANT CHANGE UP (ref 8.4–10.5)
CHLORIDE SERPL-SCNC: 104 MMOL/L — SIGNIFICANT CHANGE UP (ref 98–107)
CO2 SERPL-SCNC: 25 MMOL/L — SIGNIFICANT CHANGE UP (ref 22–31)
CREAT SERPL-MCNC: 0.79 MG/DL — SIGNIFICANT CHANGE UP (ref 0.5–1.3)
FLU A RESULT: NOT DETECTED — SIGNIFICANT CHANGE UP
FLU A RESULT: NOT DETECTED — SIGNIFICANT CHANGE UP
FLUAV AG NPH QL: NOT DETECTED — SIGNIFICANT CHANGE UP
FLUBV AG NPH QL: NOT DETECTED — SIGNIFICANT CHANGE UP
GLUCOSE SERPL-MCNC: 95 MG/DL — SIGNIFICANT CHANGE UP (ref 70–99)
HCT VFR BLD CALC: 34.5 % — SIGNIFICANT CHANGE UP (ref 34.5–45)
HGB BLD-MCNC: 9.7 G/DL — LOW (ref 11.5–15.5)
MCHC RBC-ENTMCNC: 22.6 PG — LOW (ref 27–34)
MCHC RBC-ENTMCNC: 28.1 % — LOW (ref 32–36)
MCV RBC AUTO: 80.4 FL — SIGNIFICANT CHANGE UP (ref 80–100)
NRBC # FLD: 0 K/UL — SIGNIFICANT CHANGE UP (ref 0–0)
PLATELET # BLD AUTO: 299 K/UL — SIGNIFICANT CHANGE UP (ref 150–400)
PMV BLD: 10.3 FL — SIGNIFICANT CHANGE UP (ref 7–13)
POTASSIUM SERPL-MCNC: 3.5 MMOL/L — SIGNIFICANT CHANGE UP (ref 3.5–5.3)
POTASSIUM SERPL-SCNC: 3.5 MMOL/L — SIGNIFICANT CHANGE UP (ref 3.5–5.3)
PROT SERPL-MCNC: 6.9 G/DL — SIGNIFICANT CHANGE UP (ref 6–8.3)
RBC # BLD: 4.29 M/UL — SIGNIFICANT CHANGE UP (ref 3.8–5.2)
RBC # FLD: 16.1 % — HIGH (ref 10.3–14.5)
RSV RESULT: SIGNIFICANT CHANGE UP
RSV RNA RESP QL NAA+PROBE: SIGNIFICANT CHANGE UP
SODIUM SERPL-SCNC: 143 MMOL/L — SIGNIFICANT CHANGE UP (ref 135–145)
WBC # BLD: 7.41 K/UL — SIGNIFICANT CHANGE UP (ref 3.8–10.5)
WBC # FLD AUTO: 7.41 K/UL — SIGNIFICANT CHANGE UP (ref 3.8–10.5)

## 2020-03-09 PROCEDURE — 99284 EMERGENCY DEPT VISIT MOD MDM: CPT

## 2020-03-09 PROCEDURE — 71046 X-RAY EXAM CHEST 2 VIEWS: CPT | Mod: 26

## 2020-03-09 RX ORDER — SODIUM CHLORIDE 9 MG/ML
1000 INJECTION, SOLUTION INTRAVENOUS ONCE
Refills: 0 | Status: COMPLETED | OUTPATIENT
Start: 2020-03-09 | End: 2020-03-09

## 2020-03-09 RX ADMIN — SODIUM CHLORIDE 1000 MILLILITER(S): 9 INJECTION, SOLUTION INTRAVENOUS at 09:45

## 2020-03-09 NOTE — ED PROVIDER NOTE - CLINICAL SUMMARY MEDICAL DECISION MAKING FREE TEXT BOX
60 y/o F with known flu A contact, symptoms consistent with viral illness. Given that patient is on immunosuppressive meds, will obtain flu swab, basic labs and give 1L fluids. Likely DC home with tamiflu vs supportive care pending swab - Markell Wu DO PGY-1

## 2020-03-09 NOTE — ED ADULT NURSE NOTE - NSIMPLEMENTINTERV_GEN_ALL_ED
Implemented All Fall Risk Interventions:  Thrall to call system. Call bell, personal items and telephone within reach. Instruct patient to call for assistance. Room bathroom lighting operational. Non-slip footwear when patient is off stretcher. Physically safe environment: no spills, clutter or unnecessary equipment. Stretcher in lowest position, wheels locked, appropriate side rails in place. Provide visual cue, wrist band, yellow gown, etc. Monitor gait and stability. Monitor for mental status changes and reorient to person, place, and time. Review medications for side effects contributing to fall risk. Reinforce activity limits and safety measures with patient and family.

## 2020-03-09 NOTE — ED PROVIDER NOTE - ATTENDING CONTRIBUTION TO CARE
I have personally performed a face to face bedside history and physical examination of this patient. I have discussed the history, examination, review of systems, assessment and plan of management with the resident. I have reviewed the electronic medical record and amended it to reflect my history, review of systems, physical exam, assessment and plan.    60 y/o F with PMH of Sjogren's, RA, hypothyroidism presenting with fatigue, myalgias, intermittent cough, chest pressure, SOB since Saturday. Patient tolerating PO. Coming in as PMD told her to come to ED as patient has known contact with friend with flu A. States she has low grade fevers of 99F at home. Has been taking tylenol with some relief, last took at 6 AM. Denies recent travel or known contacts with covid-19.  VSS AF.  Exam non-toxic appearing, aaox3, lungs clear b/l, heart sound no murmur.  EKG non-ischemic.  Suspect viral syndrome.  Will obtain x-ray for pna eval, supportive care.  Dispo pending.

## 2020-03-09 NOTE — ED PROVIDER NOTE - NS ED ROS FT
CONSTITUTIONAL: +fevers, chills, no lightheadedness, no dizziness  Eyes: no visual changes  Ears: no ear drainage, no ear pain  Nose: no nasal congestion  Mouth/Throat: +sore throat  CV: No chest pain, no palpitations  PULM: +SOB, cough  GI: +nausea  : no dysuria, no hematuria  SKIN: no rashes.  NEURO: no headache, no focal weakness or numbness  LYMPH/VASC: no LE swelling

## 2020-03-09 NOTE — ED ADULT TRIAGE NOTE - CHIEF COMPLAINT QUOTE
Pt c/o cough, fever, chills, abd pain, joint pain, dizziness, Chest heaviness, and HA since Saturday

## 2020-03-09 NOTE — ED PROVIDER NOTE - PATIENT PORTAL LINK FT
You can access the FollowMyHealth Patient Portal offered by [facility] by registering at the following website: [email or web address]. By joining [facility]’s FollowMyHealth portal, you will also be able to view your health information using other applications (apps) compatible with our system.

## 2020-03-09 NOTE — ED PROVIDER NOTE - OBJECTIVE STATEMENT
60 y/o F with PMH of Sjogren's, RA, hypothyroidism presenting with fatigue, myalgias, intermittent cough, chest pressure, SOB since Saturday. Patient tolerating PO. Coming in as PMD told her to come to ED as patient has known contact with friend with flu A. States she has low grade fevers of 99F at home. Has been taking tylenol with some relief. Denies recent travel. 58 y/o F with PMH of Sjogren's, RA, hypothyroidism presenting with fatigue, myalgias, intermittent cough, chest pressure, SOB since Saturday. Patient tolerating PO. Coming in as PMD told her to come to ED as patient has known contact with friend with flu A. States she has low grade fevers of 99F at home. Has been taking tylenol with some relief, last took at 6 AM. Denies recent travel.

## 2020-06-04 ENCOUNTER — INPATIENT (INPATIENT)
Facility: HOSPITAL | Age: 60
LOS: 6 days | Discharge: NURSING, REHAB OR LONG-TERM CARE | End: 2020-06-11
Attending: INTERNAL MEDICINE | Admitting: INTERNAL MEDICINE
Payer: COMMERCIAL

## 2020-06-04 VITALS
HEART RATE: 97 BPM | DIASTOLIC BLOOD PRESSURE: 80 MMHG | RESPIRATION RATE: 15 BRPM | OXYGEN SATURATION: 100 % | TEMPERATURE: 98 F | SYSTOLIC BLOOD PRESSURE: 136 MMHG

## 2020-06-04 DIAGNOSIS — K74.3 PRIMARY BILIARY CIRRHOSIS: ICD-10-CM

## 2020-06-04 DIAGNOSIS — D89.89 OTHER SPECIFIED DISORDERS INVOLVING THE IMMUNE MECHANISM, NOT ELSEWHERE CLASSIFIED: ICD-10-CM

## 2020-06-04 DIAGNOSIS — Z90.49 ACQUIRED ABSENCE OF OTHER SPECIFIED PARTS OF DIGESTIVE TRACT: Chronic | ICD-10-CM

## 2020-06-04 DIAGNOSIS — Z90.89 ACQUIRED ABSENCE OF OTHER ORGANS: Chronic | ICD-10-CM

## 2020-06-04 DIAGNOSIS — Z98.89 OTHER SPECIFIED POSTPROCEDURAL STATES: Chronic | ICD-10-CM

## 2020-06-04 DIAGNOSIS — M54.5 LOW BACK PAIN: ICD-10-CM

## 2020-06-04 DIAGNOSIS — M25.562 PAIN IN LEFT KNEE: ICD-10-CM

## 2020-06-04 DIAGNOSIS — M35.00 SJOGREN SYNDROME, UNSPECIFIED: ICD-10-CM

## 2020-06-04 DIAGNOSIS — E03.9 HYPOTHYROIDISM, UNSPECIFIED: ICD-10-CM

## 2020-06-04 LAB
ALBUMIN SERPL ELPH-MCNC: 4.3 G/DL — SIGNIFICANT CHANGE UP (ref 3.3–5)
ALP SERPL-CCNC: 77 U/L — SIGNIFICANT CHANGE UP (ref 40–120)
ALT FLD-CCNC: 7 U/L — SIGNIFICANT CHANGE UP (ref 4–33)
ANION GAP SERPL CALC-SCNC: 14 MMO/L — SIGNIFICANT CHANGE UP (ref 7–14)
APTT BLD: 27.2 SEC — LOW (ref 27.5–36.3)
AST SERPL-CCNC: 12 U/L — SIGNIFICANT CHANGE UP (ref 4–32)
BASOPHILS # BLD AUTO: 0.06 K/UL — SIGNIFICANT CHANGE UP (ref 0–0.2)
BASOPHILS NFR BLD AUTO: 0.7 % — SIGNIFICANT CHANGE UP (ref 0–2)
BILIRUB SERPL-MCNC: < 0.2 MG/DL — LOW (ref 0.2–1.2)
BUN SERPL-MCNC: 17 MG/DL — SIGNIFICANT CHANGE UP (ref 7–23)
CALCIUM SERPL-MCNC: 9.2 MG/DL — SIGNIFICANT CHANGE UP (ref 8.4–10.5)
CHLORIDE SERPL-SCNC: 102 MMOL/L — SIGNIFICANT CHANGE UP (ref 98–107)
CO2 SERPL-SCNC: 25 MMOL/L — SIGNIFICANT CHANGE UP (ref 22–31)
CREAT SERPL-MCNC: 0.84 MG/DL — SIGNIFICANT CHANGE UP (ref 0.5–1.3)
CRP SERPL-MCNC: < 4 MG/L — SIGNIFICANT CHANGE UP
EOSINOPHIL # BLD AUTO: 0.08 K/UL — SIGNIFICANT CHANGE UP (ref 0–0.5)
EOSINOPHIL NFR BLD AUTO: 0.9 % — SIGNIFICANT CHANGE UP (ref 0–6)
GLUCOSE SERPL-MCNC: 112 MG/DL — HIGH (ref 70–99)
HCT VFR BLD CALC: 35 % — SIGNIFICANT CHANGE UP (ref 34.5–45)
HGB BLD-MCNC: 10.5 G/DL — LOW (ref 11.5–15.5)
IMM GRANULOCYTES NFR BLD AUTO: 0.3 % — SIGNIFICANT CHANGE UP (ref 0–1.5)
INR BLD: 0.9 — SIGNIFICANT CHANGE UP (ref 0.88–1.17)
LYMPHOCYTES # BLD AUTO: 0.91 K/UL — LOW (ref 1–3.3)
LYMPHOCYTES # BLD AUTO: 10.2 % — LOW (ref 13–44)
MCHC RBC-ENTMCNC: 22.9 PG — LOW (ref 27–34)
MCHC RBC-ENTMCNC: 30 % — LOW (ref 32–36)
MCV RBC AUTO: 76.3 FL — LOW (ref 80–100)
MONOCYTES # BLD AUTO: 0.61 K/UL — SIGNIFICANT CHANGE UP (ref 0–0.9)
MONOCYTES NFR BLD AUTO: 6.9 % — SIGNIFICANT CHANGE UP (ref 2–14)
NEUTROPHILS # BLD AUTO: 7.2 K/UL — SIGNIFICANT CHANGE UP (ref 1.8–7.4)
NEUTROPHILS NFR BLD AUTO: 81 % — HIGH (ref 43–77)
NRBC # FLD: 0 K/UL — SIGNIFICANT CHANGE UP (ref 0–0)
PLATELET # BLD AUTO: 358 K/UL — SIGNIFICANT CHANGE UP (ref 150–400)
PMV BLD: 10.6 FL — SIGNIFICANT CHANGE UP (ref 7–13)
POTASSIUM SERPL-MCNC: 3.7 MMOL/L — SIGNIFICANT CHANGE UP (ref 3.5–5.3)
POTASSIUM SERPL-SCNC: 3.7 MMOL/L — SIGNIFICANT CHANGE UP (ref 3.5–5.3)
PROT SERPL-MCNC: 6.3 G/DL — SIGNIFICANT CHANGE UP (ref 6–8.3)
PROTHROM AB SERPL-ACNC: 10.2 SEC — SIGNIFICANT CHANGE UP (ref 9.8–13.1)
RBC # BLD: 4.59 M/UL — SIGNIFICANT CHANGE UP (ref 3.8–5.2)
RBC # FLD: 16.5 % — HIGH (ref 10.3–14.5)
SODIUM SERPL-SCNC: 141 MMOL/L — SIGNIFICANT CHANGE UP (ref 135–145)
WBC # BLD: 8.89 K/UL — SIGNIFICANT CHANGE UP (ref 3.8–10.5)
WBC # FLD AUTO: 8.89 K/UL — SIGNIFICANT CHANGE UP (ref 3.8–10.5)

## 2020-06-04 PROCEDURE — 99285 EMERGENCY DEPT VISIT HI MDM: CPT

## 2020-06-04 PROCEDURE — 93010 ELECTROCARDIOGRAM REPORT: CPT

## 2020-06-04 PROCEDURE — 73502 X-RAY EXAM HIP UNI 2-3 VIEWS: CPT | Mod: 26,LT

## 2020-06-04 PROCEDURE — 73562 X-RAY EXAM OF KNEE 3: CPT | Mod: 26,LT

## 2020-06-04 RX ORDER — HYDROMORPHONE HYDROCHLORIDE 2 MG/ML
2 INJECTION INTRAMUSCULAR; INTRAVENOUS; SUBCUTANEOUS ONCE
Refills: 0 | Status: DISCONTINUED | OUTPATIENT
Start: 2020-06-04 | End: 2020-06-04

## 2020-06-04 RX ORDER — LEVOTHYROXINE SODIUM 125 MCG
75 TABLET ORAL DAILY
Refills: 0 | Status: DISCONTINUED | OUTPATIENT
Start: 2020-06-04 | End: 2020-06-11

## 2020-06-04 RX ORDER — LIDOCAINE 4 G/100G
1 CREAM TOPICAL ONCE
Refills: 0 | Status: COMPLETED | OUTPATIENT
Start: 2020-06-04 | End: 2020-06-04

## 2020-06-04 RX ORDER — LANOLIN ALCOHOL/MO/W.PET/CERES
3 CREAM (GRAM) TOPICAL AT BEDTIME
Refills: 0 | Status: DISCONTINUED | OUTPATIENT
Start: 2020-06-04 | End: 2020-06-11

## 2020-06-04 RX ORDER — ACETAMINOPHEN 500 MG
1000 TABLET ORAL ONCE
Refills: 0 | Status: COMPLETED | OUTPATIENT
Start: 2020-06-04 | End: 2020-06-04

## 2020-06-04 RX ORDER — HYDROMORPHONE HYDROCHLORIDE 2 MG/ML
1 INJECTION INTRAMUSCULAR; INTRAVENOUS; SUBCUTANEOUS ONCE
Refills: 0 | Status: DISCONTINUED | OUTPATIENT
Start: 2020-06-04 | End: 2020-06-04

## 2020-06-04 RX ORDER — DIAZEPAM 5 MG
5 TABLET ORAL ONCE
Refills: 0 | Status: DISCONTINUED | OUTPATIENT
Start: 2020-06-04 | End: 2020-06-04

## 2020-06-04 RX ORDER — HYDROMORPHONE HYDROCHLORIDE 2 MG/ML
2 INJECTION INTRAMUSCULAR; INTRAVENOUS; SUBCUTANEOUS EVERY 6 HOURS
Refills: 0 | Status: DISCONTINUED | OUTPATIENT
Start: 2020-06-04 | End: 2020-06-05

## 2020-06-04 RX ORDER — LIDOCAINE 4 G/100G
2 CREAM TOPICAL ONCE
Refills: 0 | Status: COMPLETED | OUTPATIENT
Start: 2020-06-04 | End: 2020-06-04

## 2020-06-04 RX ORDER — URSODIOL 250 MG/1
300 TABLET, FILM COATED ORAL
Refills: 0 | Status: DISCONTINUED | OUTPATIENT
Start: 2020-06-04 | End: 2020-06-11

## 2020-06-04 RX ORDER — LAMOTRIGINE 25 MG/1
150 TABLET, ORALLY DISINTEGRATING ORAL DAILY
Refills: 0 | Status: DISCONTINUED | OUTPATIENT
Start: 2020-06-04 | End: 2020-06-11

## 2020-06-04 RX ORDER — HYDROXYCHLOROQUINE SULFATE 200 MG
200 TABLET ORAL
Refills: 0 | Status: DISCONTINUED | OUTPATIENT
Start: 2020-06-04 | End: 2020-06-11

## 2020-06-04 RX ORDER — LAMOTRIGINE 25 MG/1
25 TABLET, ORALLY DISINTEGRATING ORAL DAILY
Refills: 0 | Status: DISCONTINUED | OUTPATIENT
Start: 2020-06-04 | End: 2020-06-11

## 2020-06-04 RX ADMIN — HYDROMORPHONE HYDROCHLORIDE 1 MILLIGRAM(S): 2 INJECTION INTRAMUSCULAR; INTRAVENOUS; SUBCUTANEOUS at 11:12

## 2020-06-04 RX ADMIN — HYDROMORPHONE HYDROCHLORIDE 2 MILLIGRAM(S): 2 INJECTION INTRAMUSCULAR; INTRAVENOUS; SUBCUTANEOUS at 22:02

## 2020-06-04 RX ADMIN — HYDROMORPHONE HYDROCHLORIDE 2 MILLIGRAM(S): 2 INJECTION INTRAMUSCULAR; INTRAVENOUS; SUBCUTANEOUS at 08:00

## 2020-06-04 RX ADMIN — Medication 5 MILLIGRAM(S): at 13:03

## 2020-06-04 RX ADMIN — Medication 2 MILLIGRAM(S): at 22:02

## 2020-06-04 RX ADMIN — LIDOCAINE 1 PATCH: 4 CREAM TOPICAL at 18:39

## 2020-06-04 RX ADMIN — Medication 400 MILLIGRAM(S): at 18:29

## 2020-06-04 RX ADMIN — HYDROMORPHONE HYDROCHLORIDE 1 MILLIGRAM(S): 2 INJECTION INTRAMUSCULAR; INTRAVENOUS; SUBCUTANEOUS at 14:06

## 2020-06-04 RX ADMIN — LIDOCAINE 2 PATCH: 4 CREAM TOPICAL at 09:08

## 2020-06-04 RX ADMIN — LIDOCAINE 2 PATCH: 4 CREAM TOPICAL at 22:25

## 2020-06-04 RX ADMIN — LIDOCAINE 2 PATCH: 4 CREAM TOPICAL at 18:39

## 2020-06-04 RX ADMIN — LIDOCAINE 1 PATCH: 4 CREAM TOPICAL at 14:07

## 2020-06-04 RX ADMIN — HYDROMORPHONE HYDROCHLORIDE 1 MILLIGRAM(S): 2 INJECTION INTRAMUSCULAR; INTRAVENOUS; SUBCUTANEOUS at 17:12

## 2020-06-04 RX ADMIN — Medication 3 MILLIGRAM(S): at 23:59

## 2020-06-04 NOTE — ED PROVIDER NOTE - OBJECTIVE STATEMENT
59y Female with PMHx of sjorgens, primary biliary cholangitis, HLD presents with left knee and hip pain since yesterday. Pt reports right knee pain that started yesterday morning. Pain progressively worsened over the last day. This morning, pt reports 10/10 achy pain that starts in the L hip/back and radiates to the knee. Pain is described as constant pressure, with pain the worst in the hip. Patient unable to bare weight or tolerate ambulation secondary to pain. Pt able to move joint with severe discomfort. Pt took Dilaudid 8mg with minimal relief (typically takes 4mg Dilaudid for primary biliary cholangitis). Pt reports episode in the past but never this severe. Pt denies trauma or injury "to her recollection." Denies rash, fever, chills, CP, SOB, abdominal pain, diarrhea, pain with urination or increased frequency.

## 2020-06-04 NOTE — ED ADULT TRIAGE NOTE - HEART RATE (BEATS/MIN)
Forms received from: [employer]   Phone number listed: [phone]   Fax listed: [phone]  Karlee Mcclain         97

## 2020-06-04 NOTE — ED ADULT NURSE NOTE - OBJECTIVE STATEMENT
patient received to room 15. A&Ox4. not ambulatory. C/O left knee, left hip and lower back pain. started yesterday. pain was unrelieved by 8MG of Dilaudid. respirations even and unlabored, spo2 100%on room air. denies chest pain sinus rhythm on monitor. abdomen soft and nontender, denies nausea vomiting or diarrhea. history of neuropathy, hypothyroid, biliary cirrhosis. awaiting further MD orders. will continue to monitor.

## 2020-06-04 NOTE — ED PROVIDER NOTE - PROGRESS NOTE DETAILS
Valerio PA: Nurse reports patient is still having 10/10 pain even with 2mg Dilaudid IV around 8am. Lidoderm patches given - one for knee and one for lower back/hip pain. Pt is aware that if pain can not be controlled and patient can not walk, patient will be admitted for pain management and PT evaluation. Basics and COVID swab ordered. Pt understands plan and does not have any questions at this time. Pt is resting, in some discomfort, no distress. VSS. Valerio HAYES: Pt reports continued 7/10 knee pain and 10/10 knee pain regardless of 2mg Dilaudid IV and lidoderm patches. Pt reports history of sciatica and states this is not like her prior episodes of sciatica. Pt is "unable to describe it right now." Discussed need for admission for pain management, PT eval and MRI since there workup thus far has not provided a reason for the pain. Labs WNL. IMPRESSION of knee and hip/pelvis xray:  No acute bone or joint disease. Called Dr. Bipin Melara [phone] to discuss admission - he requests reevaluation after 1mg Dilaudid IV is given and would like an ESR/CRP despite discussing the low suspicion of septic joint and need for MRI to further evaluate where the patients pain is coming from. Also discussed PT eval and pain management. Will reevaluate patient 30 minutes after medication is given and follow up with Dr. Melara. Pt is resting comfortably, in no distress. VSS.

## 2020-06-04 NOTE — ED PROVIDER NOTE - CLINICAL SUMMARY MEDICAL DECISION MAKING FREE TEXT BOX
59y Female with PMHx of Sjogren's, primary biliary cholangitis, HLD presents with left knee and hip pain since yesterday. 10/10 pressure like pain, back/hip > knee. Dialudid 8mg taken with minimal relief. One episode of pain in the past but not this severe. No fever, chills or erythema over joint area. No increased temperature over joints. Pt able to move joints. Normal neurologic examination. Likely sciatica, less likely knee or hip sprain or fracture. 2mg IV Dilaudid, xrays, reassess.

## 2020-06-04 NOTE — H&P ADULT - NSHPPHYSICALEXAM_GEN_ALL_CORE
Vital Signs Last 24 Hrs  T(C): 37.2 (04 Jun 2020 17:00), Max: 37.2 (04 Jun 2020 17:00)  T(F): 98.9 (04 Jun 2020 17:00), Max: 98.9 (04 Jun 2020 17:00)  HR: 80 (04 Jun 2020 17:00) (80 - 99)  BP: 137/72 (04 Jun 2020 17:00) (117/67 - 137/72)  BP(mean): --  RR: 18 (04 Jun 2020 17:00) (15 - 18)  SpO2: 100% (04 Jun 2020 17:00) (99% - 100%)      PHYSICAL EXAM:   · CONSTITUTIONAL: - - -    · Manner: appropriate for situation  · Mentation: awake, alert, oriented to person, place, time/situation  · CARDIAC RHYTHM: regular  · CARDIAC SOUNDS: S1-S2    · CARDIAC CAPI REFILL: less than or equal to 2 seconds  · CARDIAC PULSES: normal bilaterally  · kybfa- bilatteral Breath Sounds: normal  · GASTROINTESTINAL: - - -  · Abdominal Exam: soft, nondistended  · Abdominal Nontender Location: left upper quadrant, right upper quadrant, left lower quadrant, right lower quadrant  · GENITOURINARY: - - -  · Bladder: non-tender  non-distended  · MUSCULOSKELETAL: - - -  · MUSC EXAM: atraumatic, TENDERNESS, neck supple, motor intact  · MUSC NECK: no deformity, pain or tenderness. no restriction of movement  · Spinal Exam: no deformity, pain or tenderness. no restriction of movement  · Costovertebral Angle Tenderness: no tenderness  · MUSC TENDER: none  · MSUC EXT EXAM: left lower extremity findings  · Left Lower Location: hip  knee  normal toes, foot and ankle  · Left Lower Extremity: LIMITED ROM, REDUCED STRENGTH, SWELLING, TENDERNESS, tenderness to left buttock area  · MUSC VASC: no vascular compromise  pulses full and equal bilaterally  · MUSC PEDAL EDEMA: none  · NEUROLOGICAL: Alert and oriented, no focal deficits, no motor or sensory deficits.  · SKIN: Skin normal color for race, warm, dry and intact. No evidence of rash.

## 2020-06-04 NOTE — PROVIDER CONTACT NOTE (OTHER) - SITUATION
Patient has c/o intolerable pain in left knee, hip, and back. patient given 1mg dilaudid on admit to floor and iv tylenol. Patient requesting denson b/c positioning of bedpan is too painful.

## 2020-06-04 NOTE — ED ADULT NURSE NOTE - NSIMPLEMENTINTERV_GEN_ALL_ED
Implemented All Fall Risk Interventions:  [place] to call system. Call bell, personal items and telephone within reach. Instruct patient to call for assistance. Room bathroom lighting operational. Non-slip footwear when patient is off stretcher. Physically safe environment: no spills, clutter or unnecessary equipment. Stretcher in lowest position, wheels locked, appropriate side rails in place. Provide visual cue, wrist band, yellow gown, etc. Monitor gait and stability. Monitor for mental status changes and reorient to person, place, and time. Review medications for side effects contributing to fall risk. Reinforce activity limits and safety measures with patient and family.

## 2020-06-04 NOTE — ED PROVIDER NOTE - CARE PLAN
Principal Discharge DX:	Acute left-sided low back pain, unspecified whether sciatica present  Secondary Diagnosis:	Acute pain of left knee

## 2020-06-04 NOTE — ED ADULT NURSE REASSESSMENT NOTE - NS ED NURSE REASSESS COMMENT FT1
Receiving pt. from day RN. Pt. is A&Ox3 and in visible pain. C/O 10/10 pain to left knee and back. MD Luo made aware and medication given. Will reassess. Receiving pt. from day RN. Pt. is A&Ox3 and in visible pain. C/O 10/10 pain to left knee and back. ABIGAIL Luo made aware and medication given. Will reassess.

## 2020-06-04 NOTE — CHART NOTE - NSCHARTNOTEFT_GEN_A_CORE
Notified by RN that patient is having pain Notified by RN that patient is having pain.    Patient is a 59y Female with PMHx of sjorgens, primary biliary cirrhosis and HLD who presented with left knee and hip pain over several days, awoke this morning with escalation or pain.  . Pain progressively worsened over the last day. Notified by RN that patient is having left knee pain.    Patient is a 59y female with PMHx of sjorgens, primary biliary cirrhosis and HLD who presented with left knee and hip pain. During the night patient was complaining of excruciating pain on the left side. She states that the pain is a 9/10 without movement and 10/10 when she moves.      Vital Signs Last 24 Hrs  T(C): 36.8 (04 Jun 2020 21:59), Max: 37.2 (04 Jun 2020 17:00)  T(F): 98.3 (04 Jun 2020 21:59), Max: 98.9 (04 Jun 2020 17:00)  HR: 69 (05 Jun 2020 03:12) (69 - 99)  BP: 126/83 (05 Jun 2020 03:12) (117/67 - 139/90)  RR: 18 (04 Jun 2020 21:59) (15 - 18)  SpO2: 100% (04 Jun 2020 21:59) (99% - 100%)    Patient was assessed at bedside  General: In acute distress  Chest: CTA b/l  Heart: s1 and s2 present  Musculoskeletal: Tender with palpation on left knee    Radiology  Left Hip Xray: No acute bone or joint disease  Left Knee Pain Xray: No acute bone or joint disease    A/P  58 y/o female with left hip and knee pain    -Dilaudid 2mg IV push q6  -consider pain management consult in am  -maikol porras in am    Will continue to monitor patient closely    Janae Hoyt PA-C  pager [phone] Notified by RN that patient is having left knee pain.    Patient is a 59y female with PMHx of sjorgens, primary biliary cirrhosis and HLD who presented with left knee and hip pain. During the night patient was complaining of excruciating pain on the left side. She states that the pain is a 9/10 without movement and 10/10 when she moves.      Vital Signs Last 24 Hrs  T(C): 36.8 (04 Jun 2020 21:59), Max: 37.2 (04 Jun 2020 17:00)  T(F): 98.3 (04 Jun 2020 21:59), Max: 98.9 (04 Jun 2020 17:00)  HR: 69 (05 Jun 2020 03:12) (69 - 99)  BP: 126/83 (05 Jun 2020 03:12) (117/67 - 139/90)  RR: 18 (04 Jun 2020 21:59) (15 - 18)  SpO2: 100% (04 Jun 2020 21:59) (99% - 100%)    Patient was assessed at bedside  General: In acute distress  Chest: CTA b/l  Heart: s1 and s2 present  Musculoskeletal: Tender with palpation on left knee    Radiology  Left Hip Xray: No acute bone or joint disease  Left Knee Pain Xray: No acute bone or joint disease    A/P  60 y/o female with left hip and knee pain    -Dilaudid 2mg IV push q6  -hot packs  -consider pain management consult in am  -maikol porras in am    Will continue to monitor patient closely    Janae Hoyt PA-C  pager [phone]

## 2020-06-04 NOTE — PROVIDER CONTACT NOTE (OTHER) - ASSESSMENT
patient has 10/10 pain and has no pain medications to be given at this time. Patient requesting denson since positioning on bedpan is too painful

## 2020-06-04 NOTE — ED ADULT TRIAGE NOTE - CHIEF COMPLAINT QUOTE
alert oriented c/o left knee left lower back and left hip pain  denies injury   unable to walk took Dilaudid 8mg at 0600 hx biliary cholangitis

## 2020-06-04 NOTE — ED ADULT NURSE REASSESSMENT NOTE - NS ED NURSE REASSESS COMMENT FT1
receiving pt from night RN. pt aox3. pt slow to answer questions. Pt c/o 10/10 pain to L knee (pain level reports to ABIGAIL RODRIGUEZ). pt reports pain to left lower back as well. pt denies headache, chest pain, SOB, abdominal pain, n/v/d.

## 2020-06-04 NOTE — H&P ADULT - HISTORY OF PRESENT ILLNESS
· HPI Objective Statement: 59y Female with PMHx of sjorgens, primary biliary cirrhosis, HLD presents with left knee and hip pain over several days, awoke this morning with escalation or pain.  . Pain progressively worsened over the last day. This morning, pt reports 10/10 achy pain that starts in the L hip/back and radiates to the knee. Pain is described as constant pressure, with pain the worst in the hip. Patient unable to bare weight or tolerate ambulation secondary to pain. Pt able to move joint with severe discomfort. Pt took Dilaudid 8mg with minimal relief (typically takes 4mg Dilaudid for primary biliary cholangitis). Pt reports episode in the past but never this severe. Pt denies trauma or injurydenied fever, chills, CP, SOB, abdominal pain, diarrhea, pain with urination or increased frequency.

## 2020-06-04 NOTE — H&P ADULT - NSHPLABSRESULTS_GEN_ALL_CORE
10.5   8.89  )-----------( 358      ( 04 Jun 2020 09:23 )             35.0     04 Jun 2020 09:33    141    |  102    |  17     ----------------------------<  112    3.7     |  25     |  0.84     Ca    9.2        04 Jun 2020 09:33    TPro  6.3    /  Alb  4.3    /  TBili  < 0.2  /  DBili  x      /  AST  12     /  ALT  7      /  AlkPhos  77     04 Jun 2020 09:33    PT/INR - ( 04 Jun 2020 09:23 )   PT: 10.2 SEC;   INR: 0.90          PTT - ( 04 Jun 2020 09:23 )  PTT:27.2 SEC    < from: Xray Knee 3 Views, Left (06.04.20 @ 09:05) >    EXAM:  XR HIP WITH PELV 2-3V LT      EXAM:  RAD KNEE 3 VIEWS LEFT        PROCEDURE DATE:  Jun 4 2020         INTERPRETATION:  TIME OF EXAM: June 4, 2020 at 9:04 AM    CLINICAL INFORMATION: Left lower extremity pain    TECHNIQUE:   AP pelvis; AP and lateral views of the left hip; AP, oblique and lateral radiographs of the left knee.    INTERPRETATION:     There is no acute fracture or dislocation. The joint spaces are intact. No joint effusion. Bone density and texture is normal.      COMPARISON:  None available      IMPRESSION:  No acute bone or joint disease.    < end of copied text >    < from: Xray Hip w/ Pelvis 2 or 3 Views, Left (06.04.20 @ 09:05) >        INTERPRETATION:  TIME OF EXAM: June 4, 2020 at 9:04 AM    CLINICAL INFORMATION: Left lower extremity pain    TECHNIQUE:   AP pelvis; AP and lateral views of the left hip; AP, oblique and lateral radiographs of the left knee.    INTERPRETATION:     There is no acute fracture or dislocation. The joint spaces are intact. No joint effusion. Bone density and texture is normal.      COMPARISON:  None available      IMPRESSION:  No acute bone or joint disease.    < end of copied text >

## 2020-06-04 NOTE — PROVIDER CONTACT NOTE (OTHER) - SITUATION
Patient received 1mg IV dilaudid upon arrival to floor for left knee and hip pain. Pt. c/o unrelieved pain and requesting zofran and tylenol for headache pain

## 2020-06-04 NOTE — ED ADULT NURSE REASSESSMENT NOTE - NS ED NURSE REASSESS COMMENT FT1
pt aox3. pt reports pain 9/10 to left hip, 7/10 to left knee. pt denies chest pain, sob, n/v/d. pt tba. MD GEORGE Johnson aware of pain and is to order pain med

## 2020-06-04 NOTE — ED PROVIDER NOTE - ATTENDING CONTRIBUTION TO CARE
58 yo F presenting with L knee pain which is severe that started yesterday morning.  Got worse over the course of the day and today is severe and constant and 10/10.  Present at rest and worse with movement.  This morning started having L hip pain as well.  That is worse with movement.  States unable to bear weight took 8mg Dilaudid PO prior to arrival and not helping.  No weakness and no bowel or bladder incontinence.  No trauma.    Gen: Well appearing in NAD  Head: NC/AT  Neck: trachea midline  Resp:  No distress  Ext: no deformities TTP over knee - mild swelling.  Able to straight leg raise from bed but pain in knee and hip with doing so  Neuro:  A&O appears non focal  Skin:  Warm and dry as visualized no erythema to knee or hip   Psych:  Normal affect and mood     58 yo with Knee and hip pain.  has a sound similar to sciatica.  However progression is atypical  Not consistent with a septic joint.  Nor does a pathological fracture.  Not enough swelling to do an arthrocentesis.  Will treat pain aggressively.  Depending on response will be able to discuss a dispo.

## 2020-06-05 LAB
ANION GAP SERPL CALC-SCNC: 12 MMO/L — SIGNIFICANT CHANGE UP (ref 7–14)
BUN SERPL-MCNC: 11 MG/DL — SIGNIFICANT CHANGE UP (ref 7–23)
CALCIUM SERPL-MCNC: 9.3 MG/DL — SIGNIFICANT CHANGE UP (ref 8.4–10.5)
CHLORIDE SERPL-SCNC: 96 MMOL/L — LOW (ref 98–107)
CO2 SERPL-SCNC: 25 MMOL/L — SIGNIFICANT CHANGE UP (ref 22–31)
CREAT SERPL-MCNC: 0.69 MG/DL — SIGNIFICANT CHANGE UP (ref 0.5–1.3)
GLUCOSE SERPL-MCNC: 100 MG/DL — HIGH (ref 70–99)
HCT VFR BLD CALC: 33.9 % — LOW (ref 34.5–45)
HGB BLD-MCNC: 10.1 G/DL — LOW (ref 11.5–15.5)
MAGNESIUM SERPL-MCNC: 2.2 MG/DL — SIGNIFICANT CHANGE UP (ref 1.6–2.6)
MCHC RBC-ENTMCNC: 22.3 PG — LOW (ref 27–34)
MCHC RBC-ENTMCNC: 29.8 % — LOW (ref 32–36)
MCV RBC AUTO: 75 FL — LOW (ref 80–100)
NRBC # FLD: 0 K/UL — SIGNIFICANT CHANGE UP (ref 0–0)
PHOSPHATE SERPL-MCNC: 3.5 MG/DL — SIGNIFICANT CHANGE UP (ref 2.5–4.5)
PLATELET # BLD AUTO: 332 K/UL — SIGNIFICANT CHANGE UP (ref 150–400)
PMV BLD: 10.4 FL — SIGNIFICANT CHANGE UP (ref 7–13)
POTASSIUM SERPL-MCNC: 3.5 MMOL/L — SIGNIFICANT CHANGE UP (ref 3.5–5.3)
POTASSIUM SERPL-SCNC: 3.5 MMOL/L — SIGNIFICANT CHANGE UP (ref 3.5–5.3)
RBC # BLD: 4.52 M/UL — SIGNIFICANT CHANGE UP (ref 3.8–5.2)
RBC # FLD: 16.5 % — HIGH (ref 10.3–14.5)
SARS-COV-2 RNA SPEC QL NAA+PROBE: SIGNIFICANT CHANGE UP
SODIUM SERPL-SCNC: 133 MMOL/L — LOW (ref 135–145)
WBC # BLD: 7.46 K/UL — SIGNIFICANT CHANGE UP (ref 3.8–10.5)
WBC # FLD AUTO: 7.46 K/UL — SIGNIFICANT CHANGE UP (ref 3.8–10.5)

## 2020-06-05 PROCEDURE — 51702 INSERT TEMP BLADDER CATH: CPT

## 2020-06-05 PROCEDURE — 99252 IP/OBS CONSLTJ NEW/EST SF 35: CPT

## 2020-06-05 PROCEDURE — 99255 IP/OBS CONSLTJ NEW/EST HI 80: CPT | Mod: GC

## 2020-06-05 RX ORDER — DIPHENHYDRAMINE HCL 50 MG
25 CAPSULE ORAL EVERY 6 HOURS
Refills: 0 | Status: DISCONTINUED | OUTPATIENT
Start: 2020-06-05 | End: 2020-06-11

## 2020-06-05 RX ORDER — HYDROMORPHONE HYDROCHLORIDE 2 MG/ML
4 INJECTION INTRAMUSCULAR; INTRAVENOUS; SUBCUTANEOUS EVERY 4 HOURS
Refills: 0 | Status: DISCONTINUED | OUTPATIENT
Start: 2020-06-05 | End: 2020-06-11

## 2020-06-05 RX ORDER — ENOXAPARIN SODIUM 100 MG/ML
40 INJECTION SUBCUTANEOUS DAILY
Refills: 0 | Status: DISCONTINUED | OUTPATIENT
Start: 2020-06-05 | End: 2020-06-11

## 2020-06-05 RX ORDER — HYDROMORPHONE HYDROCHLORIDE 2 MG/ML
6 INJECTION INTRAMUSCULAR; INTRAVENOUS; SUBCUTANEOUS EVERY 4 HOURS
Refills: 0 | Status: DISCONTINUED | OUTPATIENT
Start: 2020-06-05 | End: 2020-06-11

## 2020-06-05 RX ORDER — GABAPENTIN 400 MG/1
100 CAPSULE ORAL EVERY 8 HOURS
Refills: 0 | Status: DISCONTINUED | OUTPATIENT
Start: 2020-06-05 | End: 2020-06-05

## 2020-06-05 RX ORDER — LIDOCAINE 4 G/100G
2 CREAM TOPICAL DAILY
Refills: 0 | Status: DISCONTINUED | OUTPATIENT
Start: 2020-06-05 | End: 2020-06-11

## 2020-06-05 RX ORDER — ACETAMINOPHEN 500 MG
1000 TABLET ORAL ONCE
Refills: 0 | Status: COMPLETED | OUTPATIENT
Start: 2020-06-05 | End: 2020-06-05

## 2020-06-05 RX ORDER — TIZANIDINE 4 MG/1
2 TABLET ORAL EVERY 6 HOURS
Refills: 0 | Status: DISCONTINUED | OUTPATIENT
Start: 2020-06-05 | End: 2020-06-11

## 2020-06-05 RX ORDER — HYDROMORPHONE HYDROCHLORIDE 2 MG/ML
2 INJECTION INTRAMUSCULAR; INTRAVENOUS; SUBCUTANEOUS ONCE
Refills: 0 | Status: DISCONTINUED | OUTPATIENT
Start: 2020-06-05 | End: 2020-06-05

## 2020-06-05 RX ORDER — GABAPENTIN 400 MG/1
200 CAPSULE ORAL EVERY 8 HOURS
Refills: 0 | Status: DISCONTINUED | OUTPATIENT
Start: 2020-06-05 | End: 2020-06-11

## 2020-06-05 RX ORDER — GABAPENTIN 400 MG/1
300 CAPSULE ORAL EVERY 8 HOURS
Refills: 0 | Status: DISCONTINUED | OUTPATIENT
Start: 2020-06-05 | End: 2020-06-05

## 2020-06-05 RX ORDER — HYDROMORPHONE HYDROCHLORIDE 2 MG/ML
1 INJECTION INTRAMUSCULAR; INTRAVENOUS; SUBCUTANEOUS ONCE
Refills: 0 | Status: DISCONTINUED | OUTPATIENT
Start: 2020-06-05 | End: 2020-06-05

## 2020-06-05 RX ADMIN — HYDROMORPHONE HYDROCHLORIDE 2 MILLIGRAM(S): 2 INJECTION INTRAMUSCULAR; INTRAVENOUS; SUBCUTANEOUS at 07:36

## 2020-06-05 RX ADMIN — HYDROMORPHONE HYDROCHLORIDE 6 MILLIGRAM(S): 2 INJECTION INTRAMUSCULAR; INTRAVENOUS; SUBCUTANEOUS at 14:26

## 2020-06-05 RX ADMIN — GABAPENTIN 200 MILLIGRAM(S): 400 CAPSULE ORAL at 22:31

## 2020-06-05 RX ADMIN — LIDOCAINE 2 PATCH: 4 CREAM TOPICAL at 14:23

## 2020-06-05 RX ADMIN — LIDOCAINE 1 PATCH: 4 CREAM TOPICAL at 01:37

## 2020-06-05 RX ADMIN — Medication 3 MILLIGRAM(S): at 22:31

## 2020-06-05 RX ADMIN — GABAPENTIN 200 MILLIGRAM(S): 400 CAPSULE ORAL at 14:25

## 2020-06-05 RX ADMIN — Medication 200 MILLIGRAM(S): at 07:36

## 2020-06-05 RX ADMIN — LIDOCAINE 2 PATCH: 4 CREAM TOPICAL at 19:12

## 2020-06-05 RX ADMIN — Medication 400 MILLIGRAM(S): at 01:14

## 2020-06-05 RX ADMIN — HYDROMORPHONE HYDROCHLORIDE 1 MILLIGRAM(S): 2 INJECTION INTRAMUSCULAR; INTRAVENOUS; SUBCUTANEOUS at 11:30

## 2020-06-05 RX ADMIN — Medication 25 MILLIGRAM(S): at 10:33

## 2020-06-05 RX ADMIN — Medication 2 MILLIGRAM(S): at 10:32

## 2020-06-05 RX ADMIN — TIZANIDINE 2 MILLIGRAM(S): 4 TABLET ORAL at 22:31

## 2020-06-05 RX ADMIN — Medication 25 MILLIGRAM(S): at 22:38

## 2020-06-05 RX ADMIN — Medication 75 MICROGRAM(S): at 07:36

## 2020-06-05 RX ADMIN — HYDROMORPHONE HYDROCHLORIDE 6 MILLIGRAM(S): 2 INJECTION INTRAMUSCULAR; INTRAVENOUS; SUBCUTANEOUS at 23:20

## 2020-06-05 RX ADMIN — HYDROMORPHONE HYDROCHLORIDE 2 MILLIGRAM(S): 2 INJECTION INTRAMUSCULAR; INTRAVENOUS; SUBCUTANEOUS at 03:13

## 2020-06-05 RX ADMIN — LAMOTRIGINE 150 MILLIGRAM(S): 25 TABLET, ORALLY DISINTEGRATING ORAL at 14:26

## 2020-06-05 RX ADMIN — URSODIOL 300 MILLIGRAM(S): 250 TABLET, FILM COATED ORAL at 19:11

## 2020-06-05 RX ADMIN — TIZANIDINE 2 MILLIGRAM(S): 4 TABLET ORAL at 14:25

## 2020-06-05 RX ADMIN — LAMOTRIGINE 25 MILLIGRAM(S): 25 TABLET, ORALLY DISINTEGRATING ORAL at 14:25

## 2020-06-05 RX ADMIN — HYDROMORPHONE HYDROCHLORIDE 6 MILLIGRAM(S): 2 INJECTION INTRAMUSCULAR; INTRAVENOUS; SUBCUTANEOUS at 19:12

## 2020-06-05 RX ADMIN — ENOXAPARIN SODIUM 40 MILLIGRAM(S): 100 INJECTION SUBCUTANEOUS at 14:24

## 2020-06-05 RX ADMIN — URSODIOL 300 MILLIGRAM(S): 250 TABLET, FILM COATED ORAL at 07:36

## 2020-06-05 RX ADMIN — Medication 200 MILLIGRAM(S): at 19:11

## 2020-06-05 NOTE — CONSULT NOTE ADULT - ASSESSMENT
58 y/o F with PMH of Sjogren's, Raynaud's, Chilblain's, Primary Biliary sclerosis, hypothyroidism, Anemia, Asthma, HLD presents with progressively worsening left knee and left hip pain over last 2 weeks.   Xray hip and Knee unremarkable.   No fever, leukocytosis or elevated inflammatory markers.     Impression:   # Left hip pain radiating to left knee:  neuropathic vs articular pain  - on limited exam at the bedside due to pain there is no clear evidence for acute inflammatory knee arthritis.- will obtain MRI hip and knee   - In addition normal ESR and lack of leukocytosis are reassuring for infectious vs inflammatory process.   - pain management follows- agree with Gabapentin for neuropathic pain- should monitor for worsening urinary retention as side effect    # Urinary retention  - could be due to pain medication   - however in view of lower back pain and radicular pain should consider myelopathy in our differentials- obtain MRI lumbosacral    Recommendation:   - Pain management input appreciated   - please send SILVERIO, dsDNA, Sjogren Ab, ROEL, RF, CCP, APLs   - Please obtain Lumbosacral MRI r/o myelopathy   - please obtain MRI left hip. If found to have any effusion noted, it needs to be drained by IR     Rheum will follow  Case was seen and discussed with Dr. Madison Ansari MD  Rheum fellow PGY 4   Pager [phone]/ [phone] 60 y/o F with PMH of Sjogren's, Raynaud's, Chilblain's, Primary Biliary sclerosis, hypothyroidism, Anemia, Asthma, HLD presents with progressively worsening left knee and left hip pain over last 2 weeks.   Xray hip and Knee unremarkable.   No fever, leukocytosis or elevated inflammatory markers.     Impression:   # Left hip pain left knee:  neuropathic vs articular pain  - on limited exam at the bedside due to pain there is no clear evidence for acute inflammatory knee arthritis.- will obtain MRI hip and knee   - In addition normal ESR and lack of leukocytosis are reassuring for infectious vs inflammatory process.   - pain management follows- agree with Gabapentin for neuropathic pain- should monitor for worsening urinary retention as side effect  - hypesthesia over the skin suspicious for neuropathic pain vs pre-zoster eruption    # Urinary retention  - could be due to pain medication   - however in view of lower back pain and radicular pain should consider myelopathy in our differentials- obtain MRI lumbosacral    Recommendation:   - Pain management input appreciated   - please send SILVERIO, dsDNA, Sjogren Ab, ROEL, RF, CCP, APLs   - monitor for any skin eruption   - Please obtain Lumbosacral MRI r/o myelopathy   - please obtain MRI left hip. If found to have any effusion noted, it needs to be drained by IR     Rheum will follow  Case was seen and discussed with Dr. Madison Ansari MD  Rheum fellow PGY 4   Pager [phone]/ [phone] 60 y/o F with PMH of Sjogren's, Raynaud's, Chilblain's, Primary Biliary sclerosis, hypothyroidism, Anemia, Asthma, HLD presents with progressively worsening left knee and left hip pain over last 2 weeks.   Xray hip and Knee unremarkable.   No fever, leukocytosis or elevated inflammatory markers.     Impression:   # Left hip pain left knee:  neuropathic vs articular pain.   - Level of pain and location atypical for Sjogrens.   not on steroids currently reducing risk of AVN  - on limited exam at the bedside due to pain there is no clear evidence for acute inflammatory knee arthritis.- will obtain MRI hip and knee   - In addition normal ESR and lack of leukocytosis are reassuring for infectious vs inflammatory process.   - pain management follows- agree with Gabapentin for neuropathic pain- should monitor for worsening urinary retention as side effect  - hypesthesia over the skin suspicious for neuropathic pain vs pre-zoster eruption    # Urinary retention  - could be due to pain medication   - however in view of lower back pain and radicular pain should consider myelopathy in our differentials- obtain MRI lumbosacral    #Sjogrens.  without PBC but no other features of systemic organ involvement in the past.  - will obtain collateral from primary rheumatologist    Recommendation:   - Pain management input appreciated   - please send SILVERIO, dsDNA, Sjogren Ab, ROEL, RF, CCP, APLs   - monitor for any skin eruption   - Please obtain Lumbosacral MRI r/o myelopathy   - please obtain MRI left hip. If found to have any effusion noted, it needs to be drained by IR     Rheum will follow  Case was seen and discussed with Dr. Madison Ansari MD  Rheum fellow PGY 4   Pager [phone]/ [phone]

## 2020-06-05 NOTE — PROCEDURE NOTE - NSSIZEINFR_GEN_A_CORE
"Requested Prescriptions   Pending Prescriptions Disp Refills     levothyroxine (SYNTHROID/LEVOTHROID) 137 MCG tablet [Pharmacy Med Name: LEVOTHYROXINE 0.137MG (137MCG) TAB] 90 tablet 0        Last Written Prescription Date:  4.30.18  Last Fill Quantity: 90 tablet,  # refills: 3   Last office visit: 2/22/2019 with prescribing provider:  Prudencio Conde MD       Future Office Visit:   Next 5 appointments (look out 90 days)    May 03, 2019  9:40 AM CDT  PHYSICAL with Prudencio Conde MD  [facility] [address] [place] [id number] [phone]            Sig: TAKE 1 TABLET(137 MCG) BY MOUTH DAILY    Thyroid Protocol Passed - 3/28/2019  2:23 PM       Passed - Patient is 12 years or older       Passed - Recent (12 mo) or future (30 days) visit within the authorizing provider's specialty    Patient had office visit in the last 12 months or has a visit in the next 30 days with authorizing provider or within the authorizing provider's specialty.  See \"Patient Info\" tab in inbasket, or \"Choose Columns\" in Meds & Orders section of the refill encounter.             Passed - Medication is active on med list       Passed - Normal TSH on file in past 12 months    Recent Labs   Lab Test 04/30/18  1021   TSH 2.08             Passed - No active pregnancy on record    If patient is pregnant or has had a positive pregnancy test, please check TSH.         Passed - No positive pregnancy test in past 12 months    If patient is pregnant or has had a positive pregnancy test, please check TSH.          " 16/Fr coude

## 2020-06-05 NOTE — PROVIDER CONTACT NOTE (OTHER) - ASSESSMENT
Pt appears to be in pain. Pt Pt states they have 8/10 pain. Pt denies chest pain, dizziness or shortness of breath.

## 2020-06-05 NOTE — PROVIDER CONTACT NOTE (OTHER) - SITUATION
Patient had 16 Fr Denson Catheter placed by urology, need an order to be placed for patient to have denson

## 2020-06-05 NOTE — PROVIDER CONTACT NOTE (OTHER) - RECOMMENDATIONS
Contact provider to prescribe pain medication between dilaudid doses. Provide patient with hot packs to relieve pain.

## 2020-06-05 NOTE — PROVIDER CONTACT NOTE (OTHER) - BACKGROUND
Pt is a 59 yr old female w/ pmhx of hypothyroid, sjorgen's syndrome, primary biliary cirrhosis and asthma presenting with L back pain

## 2020-06-05 NOTE — CONSULT NOTE ADULT - SUBJECTIVE AND OBJECTIVE BOX
Patient is a 59y old  Female who presents with a chief complaint of left knee and hip pain (05 Jun 2020 13:24)      HPI:  Objective Statement: 59y Female with PMHx of sjorgens, primary biliary cirrhosis, HLD presented on 6/4 with 2-3 weeks of progressive left knee and 1 day of hip pain.  Pain was worsening and patient was unable to ambulate so she came to the ED.   Pt reported 10/10 achy pain, also with 10/10 knee. Pain is described as constant pressure, with pain the worst in the hip. Pt reports pain today is 6/10.  Pt had urinary retention during admission and had denson placement, which relieved some of her pain.  Denies history of fall or trauma.      REVIEW OF SYSTEMS  Constitutional - No fever, No weight loss, No fatigue  HEENT - No eye pain, No visual disturbances, No difficulty hearing, No tinnitus, No vertigo, No neck pain  Respiratory - No cough, No wheezing, No shortness of breath  Cardiovascular - No chest pain, No palpitations  Gastrointestinal - No abdominal pain, No nausea, No vomiting, No diarrhea,  constipation   Genitourinary - +urinary retention, No dysuria, No frequency, No hematuria, No incontinence  Neurological - No headaches, No memory loss, No loss of strength, No numbness, No tremors  Skin - No itching, No rashes, No lesions   Endocrine - No temperature intolerance  Musculoskeletal - +joint pain, No joint swelling, No muscle pain  Psychiatric - No depression, No anxiety    PAST MEDICAL & SURGICAL HISTORY  Autoimmune skin disease  Neuropathy  Hypothyroid  Sjogren's syndrome  Lupus  Primary biliary cirrhosis  Asthma  S/P appendectomy  S/P knee surgery (r medial meniscus)  S/P tonsillectomy  S/P cholecystectomy      SOCIAL HISTORY  Smoking - Denied  EtOH - Denied   Drugs - Denied    FUNCTIONAL HISTORY  Lives alone in a 2 family house with 1 flight of stairs to enter  Independent at baseline for ambulation and adls    CURRENT FUNCTIONAL STATUS  can stand with assistance, limited by pain    FAMILY HISTORY   Family history of coronary artery disease  No pertinent family history in first degree relatives      RECENT LABS/IMAGING  < from: Xray Hip w/ Pelvis 2 or 3 Views, Left (06.04.20 @ 09:05) >    EXAM:  XR HIP WITH PELV 2-3V LT      EXAM:  RAD KNEE 3 VIEWS LEFT        PROCEDURE DATE:  Jun 4 2020         INTERPRETATION:  TIME OF EXAM: June 4, 2020 at 9:04 AM    CLINICAL INFORMATION: Left lower extremity pain    TECHNIQUE:   AP pelvis; AP and lateral views of the left hip; AP, oblique and lateral radiographs of the left knee.    INTERPRETATION:     There is no acute fracture or dislocation. The joint spaces are intact. No joint effusion. Bone density and texture is normal.      COMPARISON:  None available      IMPRESSION:  No acute bone or joint disease.      < end of copied text >      < from: Xray Knee 3 Views, Left (06.04.20 @ 09:05) >  EXAM:  XR HIP WITH PELV 2-3V LT      EXAM:  RAD KNEE 3 VIEWS LEFT        PROCEDURE DATE:  Jun 4 2020         INTERPRETATION:  TIME OF EXAM: June 4, 2020 at 9:04 AM    CLINICAL INFORMATION: Left lower extremity pain    TECHNIQUE:   AP pelvis; AP and lateral views of the left hip; AP, oblique and lateral radiographs of the left knee.    INTERPRETATION:     There is no acute fracture or dislocation. The joint spaces are intact. No joint effusion. Bone density and texture is normal.      COMPARISON:  None available      IMPRESSION:  No acute bone or joint disease.    < end of copied text >    CBC Full  -  ( 05 Jun 2020 05:40 )  WBC Count : 7.46 K/uL  RBC Count : 4.52 M/uL  Hemoglobin : 10.1 g/dL  Hematocrit : 33.9 %  Platelet Count - Automated : 332 K/uL  Mean Cell Volume : 75.0 fL  Mean Cell Hemoglobin : 22.3 pg  Mean Cell Hemoglobin Concentration : 29.8 %  Auto Neutrophil # : x  Auto Lymphocyte # : x  Auto Monocyte # : x  Auto Eosinophil # : x  Auto Basophil # : x  Auto Neutrophil % : x  Auto Lymphocyte % : x  Auto Monocyte % : x  Auto Eosinophil % : x  Auto Basophil % : x    06-05    133<L>  |  96<L>  |  11  ----------------------------<  100<H>  3.5   |  25  |  0.69    Ca    9.3      05 Jun 2020 05:40  Phos  3.5     06-05  Mg     2.2     06-05    TPro  6.3  /  Alb  4.3  /  TBili  < 0.2<L>  /  DBili  x   /  AST  12  /  ALT  7   /  AlkPhos  77  06-04        VITALS  T(C): 36.8 (06-05-20 @ 14:19), Max: 37.2 (06-04-20 @ 17:00)  HR: 87 (06-05-20 @ 14:19) (69 - 87)  BP: 130/74 (06-05-20 @ 14:19) (124/57 - 148/90)  RR: 16 (06-05-20 @ 14:19) (16 - 18)  SpO2: 98% (06-05-20 @ 14:19) (98% - 100%)  Wt(kg): --    ALLERGIES  aspirin (Anaphylaxis)  ibuprofen (Unknown)  Levaquin (Anaphylaxis)  mirtazapine (Angioedema; Rash)  penicillin (Unknown)      MEDICATIONS   diphenhydrAMINE 25 milliGRAM(s) Oral every 6 hours PRN  enoxaparin Injectable 40 milliGRAM(s) SubCutaneous daily  gabapentin 200 milliGRAM(s) Oral every 8 hours  HYDROmorphone   Tablet 4 milliGRAM(s) Oral every 4 hours PRN  HYDROmorphone   Tablet 6 milliGRAM(s) Oral every 4 hours PRN  hydroxychloroquine 200 milliGRAM(s) Oral two times a day  lamoTRIgine 150 milliGRAM(s) Oral daily  lamoTRIgine 25 milliGRAM(s) Oral daily  levothyroxine 75 MICROGram(s) Oral daily  lidocaine   Patch 2 Patch Transdermal daily  LORazepam     Tablet 2 milliGRAM(s) Oral two times a day PRN  melatonin 3 milliGRAM(s) Oral at bedtime  tiZANidine 2 milliGRAM(s) Oral every 6 hours PRN  ursodiol Capsule 300 milliGRAM(s) Oral two times a day      ----------------------------------------------------------------------------------------  PHYSICAL EXAM- unable to tolerate full MSK exam due to pain.   Constitutional - NAD, Comfortable  HEENT - NCAT, EOMI  Neck - Supple, No limited ROM  Chest - no edema  Cardiovascular - RRR, S1S2, No murmurs  Abdomen - BS+, Soft, + suprapubic tenderness  Extremities - No C/C/E, No calf tenderness + left knee medial tenderness to palpation, no erythema noted, no warmth. mild edema,  + ttp at left greater trochanteric bursa.  no pain with log roll   Neurologic Exam -                    Cognitive - Awake, Alert, AAO to self, place, date, year, situation     Communication - Fluent, No dysarthria     Cranial Nerves - CN 2-12 intact     Motor - +unable tolerate SLRT on the left, reports left sided pain with SLRT on the right.  unable to fully extend LLE                    LEFT    UE - ShAB 5/5, EF 5/5, EE 5/5, WE 5/5,  5/5                    RIGHT UE - ShAB 5/5, EF 5/5, EE 5/5, WE 5/5,  5/5                    LEFT    LE - unable to perform hip flexion or knee flexion 2/5 limited by pain,  DF 2/5 (pt states limited by pain), PF 5/5                    RIGHT LE - HF 4/5, KE 4/5, DF 4/5, PF 4/5   (pain on left hip with moving RLE)     Sensory - Intact to LT     Coordination - FTN intact     Psychiatric - Mood stable, Affect WNL  ----------------------------------------------------------------------------------------  ASSESSMENT/PLAN  59 year old female presenting with left hip and knee pain, also with urinary retention.  LS spine, L hip and L knee MRI pending      Pain -gabapentin, lidocaine, dilaudid 6mg po q 4 prn, zanaflex  DVT PPX - lovenox  recommend PT/OT pending MRI, brace for stability Patient is a 59y old  Female who presents with a chief complaint of left knee and hip pain (05 Jun 2020 13:24)      HPI:    59y Female with PMHx of sjorgens, primary biliary cirrhosis, HLD presented on 6/4 with 2-3 weeks of progressive left knee and 1 day of hip pain.  Pain was worsening and patient was unable to ambulate so she came to the ED.   Pt reported 10/10 achy pain, also with 10/10 knee. Pain is described as constant pressure, with pain the worst in the hip. Pt reports pain today is 6/10.  Pt had urinary retention during admission and had denson placement, which relieved some of her pain.  Denies history of fall or trauma.      REVIEW OF SYSTEMS  Constitutional - No fever, No weight loss, No fatigue  HEENT - No eye pain, No visual disturbances, No difficulty hearing, No tinnitus, No vertigo, No neck pain  Respiratory - No cough, No wheezing, No shortness of breath  Cardiovascular - No chest pain, No palpitations  Gastrointestinal - No abdominal pain, No nausea, No vomiting, No diarrhea,  constipation   Genitourinary - +urinary retention, No dysuria, No frequency, No hematuria, No incontinence  Neurological - No headaches, No memory loss, No loss of strength, No numbness, No tremors  Skin - No itching, No rashes, No lesions   Endocrine - No temperature intolerance  Musculoskeletal - +joint pain, No joint swelling, No muscle pain  Psychiatric - No depression, No anxiety    PAST MEDICAL & SURGICAL HISTORY  Autoimmune skin disease  Neuropathy  Hypothyroid  Sjogren's syndrome  Lupus  Primary biliary cirrhosis  Asthma  S/P appendectomy  S/P knee surgery (r medial meniscus)  S/P tonsillectomy  S/P cholecystectomy      SOCIAL HISTORY  Smoking - Denied  EtOH - Denied   Drugs - Denied    FUNCTIONAL HISTORY  Lives alone in a 2 family house with 1 flight of stairs to enter  Independent at baseline for ambulation and adls    CURRENT FUNCTIONAL STATUS  can stand with assistance, limited by pain    FAMILY HISTORY   Family history of coronary artery disease  No pertinent family history in first degree relatives      RECENT LABS/IMAGING  < from: Xray Hip w/ Pelvis 2 or 3 Views, Left (06.04.20 @ 09:05) >    EXAM:  XR HIP WITH PELV 2-3V LT      EXAM:  RAD KNEE 3 VIEWS LEFT        PROCEDURE DATE:  Jun 4 2020         INTERPRETATION:  TIME OF EXAM: June 4, 2020 at 9:04 AM    CLINICAL INFORMATION: Left lower extremity pain    TECHNIQUE:   AP pelvis; AP and lateral views of the left hip; AP, oblique and lateral radiographs of the left knee.    INTERPRETATION:     There is no acute fracture or dislocation. The joint spaces are intact. No joint effusion. Bone density and texture is normal.      COMPARISON:  None available      IMPRESSION:  No acute bone or joint disease.      < end of copied text >      < from: Xray Knee 3 Views, Left (06.04.20 @ 09:05) >  EXAM:  XR HIP WITH PELV 2-3V LT      EXAM:  RAD KNEE 3 VIEWS LEFT        PROCEDURE DATE:  Jun 4 2020         INTERPRETATION:  TIME OF EXAM: June 4, 2020 at 9:04 AM    CLINICAL INFORMATION: Left lower extremity pain    TECHNIQUE:   AP pelvis; AP and lateral views of the left hip; AP, oblique and lateral radiographs of the left knee.    INTERPRETATION:     There is no acute fracture or dislocation. The joint spaces are intact. No joint effusion. Bone density and texture is normal.      COMPARISON:  None available      IMPRESSION:  No acute bone or joint disease.    < end of copied text >    CBC Full  -  ( 05 Jun 2020 05:40 )  WBC Count : 7.46 K/uL  RBC Count : 4.52 M/uL  Hemoglobin : 10.1 g/dL  Hematocrit : 33.9 %  Platelet Count - Automated : 332 K/uL  Mean Cell Volume : 75.0 fL  Mean Cell Hemoglobin : 22.3 pg  Mean Cell Hemoglobin Concentration : 29.8 %  Auto Neutrophil # : x  Auto Lymphocyte # : x  Auto Monocyte # : x  Auto Eosinophil # : x  Auto Basophil # : x  Auto Neutrophil % : x  Auto Lymphocyte % : x  Auto Monocyte % : x  Auto Eosinophil % : x  Auto Basophil % : x    06-05    133<L>  |  96<L>  |  11  ----------------------------<  100<H>  3.5   |  25  |  0.69    Ca    9.3      05 Jun 2020 05:40  Phos  3.5     06-05  Mg     2.2     06-05    TPro  6.3  /  Alb  4.3  /  TBili  < 0.2<L>  /  DBili  x   /  AST  12  /  ALT  7   /  AlkPhos  77  06-04        VITALS  T(C): 36.8 (06-05-20 @ 14:19), Max: 37.2 (06-04-20 @ 17:00)  HR: 87 (06-05-20 @ 14:19) (69 - 87)  BP: 130/74 (06-05-20 @ 14:19) (124/57 - 148/90)  RR: 16 (06-05-20 @ 14:19) (16 - 18)  SpO2: 98% (06-05-20 @ 14:19) (98% - 100%)  Wt(kg): --    ALLERGIES  aspirin (Anaphylaxis)  ibuprofen (Unknown)  Levaquin (Anaphylaxis)  mirtazapine (Angioedema; Rash)  penicillin (Unknown)      MEDICATIONS   diphenhydrAMINE 25 milliGRAM(s) Oral every 6 hours PRN  enoxaparin Injectable 40 milliGRAM(s) SubCutaneous daily  gabapentin 200 milliGRAM(s) Oral every 8 hours  HYDROmorphone   Tablet 4 milliGRAM(s) Oral every 4 hours PRN  HYDROmorphone   Tablet 6 milliGRAM(s) Oral every 4 hours PRN  hydroxychloroquine 200 milliGRAM(s) Oral two times a day  lamoTRIgine 150 milliGRAM(s) Oral daily  lamoTRIgine 25 milliGRAM(s) Oral daily  levothyroxine 75 MICROGram(s) Oral daily  lidocaine   Patch 2 Patch Transdermal daily  LORazepam     Tablet 2 milliGRAM(s) Oral two times a day PRN  melatonin 3 milliGRAM(s) Oral at bedtime  tiZANidine 2 milliGRAM(s) Oral every 6 hours PRN  ursodiol Capsule 300 milliGRAM(s) Oral two times a day      ----------------------------------------------------------------------------------------  PHYSICAL EXAM- unable to tolerate full MSK exam due to pain.   Constitutional - in some distress due to pain  HEENT - NCAT, EOMI  Neck - Supple, No limited ROM  Chest - no edema  Cardiovascular - RRR  Abdomen - BS+, Soft, + suprapubic tenderness,  + denson  Extremities - No C/C/E, No calf tenderness + left knee medial tenderness to light palpation, no erythema noted, no warmth. mild edema,  + ttp at left greater trochanteric bursa.  no pain with log roll   Neurologic Exam -                    Cognitive - Awake, Alert, AAO to self, place, date, year, situation     Communication - Fluent, No dysarthria     Cranial Nerves - CN 2-12 intact     Motor - +unable tolerate SLRT on the left, reports left sided pain with SLRT on the right.  unable to fully extend left knee due pain.  fadir and millicent negative on the right, unable to perform on the left due to pain, unable to fully examine L knee for laxity, range, due to pain                    LEFT    UE - ShAB 5/5, EF 5/5, EE 5/5, WE 5/5,  5/5                    RIGHT UE - ShAB 5/5, EF 5/5, EE 5/5, WE 5/5,  5/5                    LEFT    LE - unable to perform hip flexion, knee flexion 2/5 limited by pain,  DF 2/5 (pt states limited by pain), PF 5/5                    RIGHT LE - HF 4/5, KE 4/5, DF 4/5, PF 4/5   (pain on left hip with moving RLE)     Sensory - Intact to LT     Coordination - FTN intact    ----------------------------------------------------------------------------------------  ASSESSMENT/PLAN  59 year old female pmh sjogrens presenting with left hip and knee pain, also with urinary retention.  LS spine, L hip and L knee MRI pending. would rule out infection however no leukocytosis, ua pending.  possible radicular component however patient has focal hip pain at bursa, tenderness to left medial knee with swelling.     would also consider crps in differential if no other cause found    Pain -gabapentin, lidocaine, dilaudid 6mg po q 4 prn, zanaflex  can try ice if tolerates  DVT PPX - lovenox  recommend PT/OT if able to tolerate, ROM.  patient is pending MRI Patient is a 59y old  Female who presents with a chief complaint of left knee and hip pain (05 Jun 2020 13:24)      HPI:    59y Female with PMHx of sjorgens, primary biliary cirrhosis, HLD presented on 6/4 with 2-3 weeks of progressive left knee and 1 day of hip pain.  Pain was worsening and patient was unable to ambulate so she came to the ED.   Pt reported 10/10 achy pain, also with 10/10 knee. Pain is described as constant pressure, with pain the worst in the hip. Pt reports pain today is 6/10.  Pt had urinary retention during admission and had denson placement, which relieved some of her pain.  Denies history of fall or trauma.      REVIEW OF SYSTEMS  Constitutional - No fever, No weight loss, No fatigue  HEENT - No eye pain, No visual disturbances, No difficulty hearing, No tinnitus, No vertigo, No neck pain  Respiratory - No cough, No wheezing, No shortness of breath  Cardiovascular - No chest pain, No palpitations  Gastrointestinal - No abdominal pain, No nausea, No vomiting, No diarrhea,  constipation   Genitourinary - +urinary retention, No dysuria, No frequency, No hematuria, No incontinence  Neurological - No headaches, No memory loss, No loss of strength, No numbness, No tremors  Skin - No itching, No rashes, No lesions   Endocrine - No temperature intolerance  Musculoskeletal - +joint pain, No joint swelling, No muscle pain  Psychiatric - No depression, No anxiety    PAST MEDICAL & SURGICAL HISTORY  Autoimmune skin disease  Neuropathy  Hypothyroid  Sjogren's syndrome  Lupus  Primary biliary cirrhosis  Asthma  S/P appendectomy  S/P knee surgery (r medial meniscus)  S/P tonsillectomy  S/P cholecystectomy      SOCIAL HISTORY  Smoking - Denied  EtOH - Denied   Drugs - Denied    FUNCTIONAL HISTORY  Lives alone in a 2 family house with 1 flight of stairs to enter  Independent at baseline for ambulation and adls    CURRENT FUNCTIONAL STATUS    limited by pain    FAMILY HISTORY   Family history of coronary artery disease  No pertinent family history in first degree relatives      RECENT LABS/IMAGING  < from: Xray Hip w/ Pelvis 2 or 3 Views, Left (06.04.20 @ 09:05) >    EXAM:  XR HIP WITH PELV 2-3V LT      EXAM:  RAD KNEE 3 VIEWS LEFT        PROCEDURE DATE:  Jun 4 2020         INTERPRETATION:  TIME OF EXAM: June 4, 2020 at 9:04 AM    CLINICAL INFORMATION: Left lower extremity pain    TECHNIQUE:   AP pelvis; AP and lateral views of the left hip; AP, oblique and lateral radiographs of the left knee.    INTERPRETATION:     There is no acute fracture or dislocation. The joint spaces are intact. No joint effusion. Bone density and texture is normal.      COMPARISON:  None available      IMPRESSION:  No acute bone or joint disease.      < end of copied text >      < from: Xray Knee 3 Views, Left (06.04.20 @ 09:05) >  EXAM:  XR HIP WITH PELV 2-3V LT      EXAM:  RAD KNEE 3 VIEWS LEFT        PROCEDURE DATE:  Jun 4 2020         INTERPRETATION:  TIME OF EXAM: June 4, 2020 at 9:04 AM    CLINICAL INFORMATION: Left lower extremity pain    TECHNIQUE:   AP pelvis; AP and lateral views of the left hip; AP, oblique and lateral radiographs of the left knee.    INTERPRETATION:     There is no acute fracture or dislocation. The joint spaces are intact. No joint effusion. Bone density and texture is normal.      COMPARISON:  None available      IMPRESSION:  No acute bone or joint disease.    < end of copied text >    CBC Full  -  ( 05 Jun 2020 05:40 )  WBC Count : 7.46 K/uL  RBC Count : 4.52 M/uL  Hemoglobin : 10.1 g/dL  Hematocrit : 33.9 %  Platelet Count - Automated : 332 K/uL  Mean Cell Volume : 75.0 fL  Mean Cell Hemoglobin : 22.3 pg  Mean Cell Hemoglobin Concentration : 29.8 %  Auto Neutrophil # : x  Auto Lymphocyte # : x  Auto Monocyte # : x  Auto Eosinophil # : x  Auto Basophil # : x  Auto Neutrophil % : x  Auto Lymphocyte % : x  Auto Monocyte % : x  Auto Eosinophil % : x  Auto Basophil % : x    06-05    133<L>  |  96<L>  |  11  ----------------------------<  100<H>  3.5   |  25  |  0.69    Ca    9.3      05 Jun 2020 05:40  Phos  3.5     06-05  Mg     2.2     06-05    TPro  6.3  /  Alb  4.3  /  TBili  < 0.2<L>  /  DBili  x   /  AST  12  /  ALT  7   /  AlkPhos  77  06-04        VITALS  T(C): 36.8 (06-05-20 @ 14:19), Max: 37.2 (06-04-20 @ 17:00)  HR: 87 (06-05-20 @ 14:19) (69 - 87)  BP: 130/74 (06-05-20 @ 14:19) (124/57 - 148/90)  RR: 16 (06-05-20 @ 14:19) (16 - 18)  SpO2: 98% (06-05-20 @ 14:19) (98% - 100%)  Wt(kg): --    ALLERGIES  aspirin (Anaphylaxis)  ibuprofen (Unknown)  Levaquin (Anaphylaxis)  mirtazapine (Angioedema; Rash)  penicillin (Unknown)      MEDICATIONS   diphenhydrAMINE 25 milliGRAM(s) Oral every 6 hours PRN  enoxaparin Injectable 40 milliGRAM(s) SubCutaneous daily  gabapentin 200 milliGRAM(s) Oral every 8 hours  HYDROmorphone   Tablet 4 milliGRAM(s) Oral every 4 hours PRN  HYDROmorphone   Tablet 6 milliGRAM(s) Oral every 4 hours PRN  hydroxychloroquine 200 milliGRAM(s) Oral two times a day  lamoTRIgine 150 milliGRAM(s) Oral daily  lamoTRIgine 25 milliGRAM(s) Oral daily  levothyroxine 75 MICROGram(s) Oral daily  lidocaine   Patch 2 Patch Transdermal daily  LORazepam     Tablet 2 milliGRAM(s) Oral two times a day PRN  melatonin 3 milliGRAM(s) Oral at bedtime  tiZANidine 2 milliGRAM(s) Oral every 6 hours PRN  ursodiol Capsule 300 milliGRAM(s) Oral two times a day      ----------------------------------------------------------------------------------------  PHYSICAL EXAM- unable to tolerate full MSK exam due to pain.   Constitutional - in some distress due to pain  HEENT - NCAT, EOMI  Neck - Supple, No limited ROM  Chest - no respiratory distress  Cardiovascular - RRR  Abdomen - BS+, Soft, + suprapubic tenderness,  + denson  Extremities -   No calf tenderness + left knee medial tenderness to light palpation, no erythema noted, no warmth. mild edema,  + ttp at left greater trochanteric bursa.  no pain with log roll   Neurologic Exam -                    Cognitive - Awake, Alert, AAO to self, place, date, year, situation     Communication - Fluent, No dysarthria     Cranial Nerves - CN 2-12 intact     Motor - +unable tolerate SLRT on the left, reports left sided pain with SLRT on the right.  unable to fully extend left knee due pain.  fadir and millicent negative on the right, unable to perform on the left due to pain, unable to fully examine L knee for laxity, range, due to pain                     LEFT    UE - ShAB 5/5, EF 5/5, EE 5/5, WE 5/5,  5/5                    RIGHT UE - ShAB 5/5, EF 5/5, EE 5/5, WE 5/5,  5/5                    LEFT    LE - unable to perform hip flexion, knee flexion 2/5 limited by pain,  DF 2/5 (pt states limited by pain), PF 5/5                    RIGHT LE - HF 4/5, KE 4/5, DF 4/5, PF 4/5   (pain on left hip with moving RLE)     Sensory - Intact to LT     Coordination - FTN intact    ----------------------------------------------------------------------------------------  ASSESSMENT/PLAN  59 year old female pmh sjogrens presenting with left hip and knee pain, also with urinary retention.  LS spine, L hip and L knee MRI pending. would rule out infection however no leukocytosis, ua pending.  possible radicular component however patient has focal hip pain at L trochanteric bursa, tenderness to left medial knee with swelling.     would also consider crps in differential if no other cause found    Pain -gabapentin, lidocaine, dilaudid 6mg po q 4 prn, zanaflex  can try ice if tolerates  DVT PPX - lovenox  recommend PT/OT if able to tolerate, ROM.  patient is pending MRI Patient is a 59y old  Female who presents with a chief complaint of left knee and hip pain (05 Jun 2020 13:24)      HPI:    59y Female with PMHx of sjorgens, primary biliary cirrhosis, HLD presented on 6/4 with 2-3 weeks of progressive left knee and 1 day of hip pain.  Pain was worsening and patient was unable to ambulate so she came to the ED.   Pt reported 10/10 achy pain, also with 10/10 knee. Pain is described as constant pressure, with pain the worst in the hip. Pt reports pain today is 6/10.  Pt had urinary retention during admission and had denson placement, which relieved some of her pain.  Denies history of fall or trauma.      REVIEW OF SYSTEMS  Constitutional - No fever, No weight loss, No fatigue  HEENT - No eye pain, No visual disturbances, No difficulty hearing, No tinnitus, No vertigo, No neck pain  Respiratory - No cough, No wheezing, No shortness of breath  Cardiovascular - No chest pain, No palpitations  Gastrointestinal - No abdominal pain, No nausea, No vomiting, No diarrhea,  constipation   Genitourinary - +urinary retention, No dysuria, No frequency, No hematuria, No incontinence  Neurological - No headaches, No memory loss, No loss of strength, No numbness, No tremors  Skin - No itching, No rashes, No lesions   Endocrine - No temperature intolerance  Musculoskeletal - +joint pain, No joint swelling, No muscle pain  Psychiatric - No depression, No anxiety    PAST MEDICAL & SURGICAL HISTORY  Autoimmune skin disease  Neuropathy  Hypothyroid  Sjogren's syndrome  Lupus  Primary biliary cirrhosis  Asthma  S/P appendectomy  S/P knee surgery (r medial meniscus)  S/P tonsillectomy  S/P cholecystectomy      SOCIAL HISTORY  Smoking - Denied  EtOH - Denied   Drugs - Denied    FUNCTIONAL HISTORY  Lives alone in a 2 family house with 1 flight of stairs to enter  Independent at baseline for ambulation and adls    CURRENT FUNCTIONAL STATUS    limited by pain    FAMILY HISTORY   Family history of coronary artery disease  No pertinent family history in first degree relatives      RECENT LABS/IMAGING  < from: Xray Hip w/ Pelvis 2 or 3 Views, Left (06.04.20 @ 09:05) >    EXAM:  XR HIP WITH PELV 2-3V LT      EXAM:  RAD KNEE 3 VIEWS LEFT        PROCEDURE DATE:  Jun 4 2020         INTERPRETATION:  TIME OF EXAM: June 4, 2020 at 9:04 AM    CLINICAL INFORMATION: Left lower extremity pain    TECHNIQUE:   AP pelvis; AP and lateral views of the left hip; AP, oblique and lateral radiographs of the left knee.    INTERPRETATION:     There is no acute fracture or dislocation. The joint spaces are intact. No joint effusion. Bone density and texture is normal.      COMPARISON:  None available      IMPRESSION:  No acute bone or joint disease.      < end of copied text >      < from: Xray Knee 3 Views, Left (06.04.20 @ 09:05) >  EXAM:  XR HIP WITH PELV 2-3V LT      EXAM:  RAD KNEE 3 VIEWS LEFT        PROCEDURE DATE:  Jun 4 2020         INTERPRETATION:  TIME OF EXAM: June 4, 2020 at 9:04 AM    CLINICAL INFORMATION: Left lower extremity pain    TECHNIQUE:   AP pelvis; AP and lateral views of the left hip; AP, oblique and lateral radiographs of the left knee.    INTERPRETATION:     There is no acute fracture or dislocation. The joint spaces are intact. No joint effusion. Bone density and texture is normal.      COMPARISON:  None available      IMPRESSION:  No acute bone or joint disease.    < end of copied text >    CBC Full  -  ( 05 Jun 2020 05:40 )  WBC Count : 7.46 K/uL  RBC Count : 4.52 M/uL  Hemoglobin : 10.1 g/dL  Hematocrit : 33.9 %  Platelet Count - Automated : 332 K/uL  Mean Cell Volume : 75.0 fL  Mean Cell Hemoglobin : 22.3 pg  Mean Cell Hemoglobin Concentration : 29.8 %  Auto Neutrophil # : x  Auto Lymphocyte # : x  Auto Monocyte # : x  Auto Eosinophil # : x  Auto Basophil # : x  Auto Neutrophil % : x  Auto Lymphocyte % : x  Auto Monocyte % : x  Auto Eosinophil % : x  Auto Basophil % : x    06-05    133<L>  |  96<L>  |  11  ----------------------------<  100<H>  3.5   |  25  |  0.69    Ca    9.3      05 Jun 2020 05:40  Phos  3.5     06-05  Mg     2.2     06-05    TPro  6.3  /  Alb  4.3  /  TBili  < 0.2<L>  /  DBili  x   /  AST  12  /  ALT  7   /  AlkPhos  77  06-04        VITALS  T(C): 36.8 (06-05-20 @ 14:19), Max: 37.2 (06-04-20 @ 17:00)  HR: 87 (06-05-20 @ 14:19) (69 - 87)  BP: 130/74 (06-05-20 @ 14:19) (124/57 - 148/90)  RR: 16 (06-05-20 @ 14:19) (16 - 18)  SpO2: 98% (06-05-20 @ 14:19) (98% - 100%)  Wt(kg): --    ALLERGIES  aspirin (Anaphylaxis)  ibuprofen (Unknown)  Levaquin (Anaphylaxis)  mirtazapine (Angioedema; Rash)  penicillin (Unknown)      MEDICATIONS   diphenhydrAMINE 25 milliGRAM(s) Oral every 6 hours PRN  enoxaparin Injectable 40 milliGRAM(s) SubCutaneous daily  gabapentin 200 milliGRAM(s) Oral every 8 hours  HYDROmorphone   Tablet 4 milliGRAM(s) Oral every 4 hours PRN  HYDROmorphone   Tablet 6 milliGRAM(s) Oral every 4 hours PRN  hydroxychloroquine 200 milliGRAM(s) Oral two times a day  lamoTRIgine 150 milliGRAM(s) Oral daily  lamoTRIgine 25 milliGRAM(s) Oral daily  levothyroxine 75 MICROGram(s) Oral daily  lidocaine   Patch 2 Patch Transdermal daily  LORazepam     Tablet 2 milliGRAM(s) Oral two times a day PRN  melatonin 3 milliGRAM(s) Oral at bedtime  tiZANidine 2 milliGRAM(s) Oral every 6 hours PRN  ursodiol Capsule 300 milliGRAM(s) Oral two times a day      ----------------------------------------------------------------------------------------  PHYSICAL EXAM- unable to tolerate full MSK exam due to pain.   Constitutional - in some distress due to pain  HEENT - NCAT, EOMI  Neck - Supple, No limited ROM  Chest - no respiratory distress  Cardiovascular - RRR  Abdomen - BS+, Soft, + suprapubic tenderness,  + denson  Extremities -   No calf tenderness + left knee medial tenderness to light palpation, no erythema noted, no warmth. mild edema,  + ttp at left greater trochanteric bursa.  no pain with log roll   Neurologic Exam -                    Cognitive - Awake, Alert, AAO to self, place, date, year, situation     Communication - Fluent, No dysarthria     Cranial Nerves - CN 2-12 intact     Motor - +unable tolerate SLRT on the left, reports left sided pain with SLRT on the right.  unable to fully extend left knee due pain.  fadir and millicent negative on the right, unable to perform on the left due to pain, unable to fully examine L knee for laxity, range, due to pain                     LEFT    UE - ShAB 5/5, EF 5/5, EE 5/5, WE 5/5,  5/5                    RIGHT UE - ShAB 5/5, EF 5/5, EE 5/5, WE 5/5,  5/5                    LEFT    LE - unable to perform hip flexion, knee flexion 2/5 limited by pain,  DF 2/5 (pt states limited by pain), PF 5/5                    RIGHT LE - HF 4/5, KE 4/5, DF 4/5, PF 4/5   (pain on left hip with moving RLE)     Sensory - Intact to LT     Coordination - FTN intact    ----------------------------------------------------------------------------------------  ASSESSMENT/PLAN  59 year old female pmh sjogrens presenting with left hip and knee pain, also with urinary retention.  LS spine, L hip and L knee MRI pending. evaluate for myelopathy, would rule out infection however no leukocytosis, ua pending.   patient has focal hip pain at L trochanteric bursa, tenderness to left medial knee with swelling.   Pt reports concern that symptom due to meniscal pain however exam is limited, denies trauma or history of fall, locking or buckling.     Pain -gabapentin, lidocaine, dilaudid 6mg po q 4 prn, zanaflex  can try ice if tolerates  DVT PPX - lovenox  recommend PT/OT if able to tolerate, ROM.  patient is pending MRI Patient is a 59y old  Female who presents with a chief complaint of left knee and hip pain (05 Jun 2020 13:24)      HPI:    59y Female with PMHx of sjorgens, primary biliary cirrhosis, HLD presented on 6/4 with 2-3 weeks of progressive left knee and 1 day of hip pain.  Pain was worsening and patient was unable to ambulate so she came to the ED.   Pt reported 10/10 achy pain, also with 10/10 knee. Pain is described as constant pressure, with pain the worst in the hip. Pt reports pain today is 6/10.  Pt had urinary retention during admission and had denson placement, which relieved some of her pain.  Denies history of fall or trauma.      REVIEW OF SYSTEMS  Constitutional - No fever, No weight loss, No fatigue  HEENT - No eye pain, No visual disturbances, No difficulty hearing, No tinnitus, No vertigo, No neck pain  Respiratory - No cough, No wheezing, No shortness of breath  Cardiovascular - No chest pain, No palpitations  Gastrointestinal - No abdominal pain, No nausea, No vomiting, No diarrhea,  constipation   Genitourinary - +urinary retention, No dysuria, No frequency, No hematuria, No incontinence  Neurological - No headaches, No memory loss, No loss of strength, No numbness, No tremors  Skin - No itching, No rashes, No lesions   Endocrine - No temperature intolerance  Musculoskeletal - +joint pain, No joint swelling, No muscle pain  Psychiatric - No depression, No anxiety    PAST MEDICAL & SURGICAL HISTORY  Autoimmune skin disease  Neuropathy  Hypothyroid  Sjogren's syndrome  Lupus  Primary biliary cirrhosis  Asthma  S/P appendectomy  S/P knee surgery (r medial meniscus)  S/P tonsillectomy  S/P cholecystectomy      SOCIAL HISTORY  Smoking - Denied  EtOH - Denied   Drugs - Denied    FUNCTIONAL HISTORY  Lives alone in a 2 family house with 1 flight of stairs to enter  Independent at baseline for ambulation and adls    CURRENT FUNCTIONAL STATUS    limited by pain    FAMILY HISTORY   Family history of coronary artery disease  No pertinent family history in first degree relatives      RECENT LABS/IMAGING  < from: Xray Hip w/ Pelvis 2 or 3 Views, Left (06.04.20 @ 09:05) >    EXAM:  XR HIP WITH PELV 2-3V LT      EXAM:  RAD KNEE 3 VIEWS LEFT        PROCEDURE DATE:  Jun 4 2020         INTERPRETATION:  TIME OF EXAM: June 4, 2020 at 9:04 AM    CLINICAL INFORMATION: Left lower extremity pain    TECHNIQUE:   AP pelvis; AP and lateral views of the left hip; AP, oblique and lateral radiographs of the left knee.    INTERPRETATION:     There is no acute fracture or dislocation. The joint spaces are intact. No joint effusion. Bone density and texture is normal.      COMPARISON:  None available      IMPRESSION:  No acute bone or joint disease.      < end of copied text >      < from: Xray Knee 3 Views, Left (06.04.20 @ 09:05) >  EXAM:  XR HIP WITH PELV 2-3V LT      EXAM:  RAD KNEE 3 VIEWS LEFT        PROCEDURE DATE:  Jun 4 2020         INTERPRETATION:  TIME OF EXAM: June 4, 2020 at 9:04 AM    CLINICAL INFORMATION: Left lower extremity pain    TECHNIQUE:   AP pelvis; AP and lateral views of the left hip; AP, oblique and lateral radiographs of the left knee.    INTERPRETATION:     There is no acute fracture or dislocation. The joint spaces are intact. No joint effusion. Bone density and texture is normal.      COMPARISON:  None available      IMPRESSION:  No acute bone or joint disease.    < end of copied text >    CBC Full  -  ( 05 Jun 2020 05:40 )  WBC Count : 7.46 K/uL  RBC Count : 4.52 M/uL  Hemoglobin : 10.1 g/dL  Hematocrit : 33.9 %  Platelet Count - Automated : 332 K/uL  Mean Cell Volume : 75.0 fL  Mean Cell Hemoglobin : 22.3 pg  Mean Cell Hemoglobin Concentration : 29.8 %  Auto Neutrophil # : x  Auto Lymphocyte # : x  Auto Monocyte # : x  Auto Eosinophil # : x  Auto Basophil # : x  Auto Neutrophil % : x  Auto Lymphocyte % : x  Auto Monocyte % : x  Auto Eosinophil % : x  Auto Basophil % : x    06-05    133<L>  |  96<L>  |  11  ----------------------------<  100<H>  3.5   |  25  |  0.69    Ca    9.3      05 Jun 2020 05:40  Phos  3.5     06-05  Mg     2.2     06-05    TPro  6.3  /  Alb  4.3  /  TBili  < 0.2<L>  /  DBili  x   /  AST  12  /  ALT  7   /  AlkPhos  77  06-04        VITALS  T(C): 36.8 (06-05-20 @ 14:19), Max: 37.2 (06-04-20 @ 17:00)  HR: 87 (06-05-20 @ 14:19) (69 - 87)  BP: 130/74 (06-05-20 @ 14:19) (124/57 - 148/90)  RR: 16 (06-05-20 @ 14:19) (16 - 18)  SpO2: 98% (06-05-20 @ 14:19) (98% - 100%)  Wt(kg): --    ALLERGIES  aspirin (Anaphylaxis)  ibuprofen (Unknown)  Levaquin (Anaphylaxis)  mirtazapine (Angioedema; Rash)  penicillin (Unknown)      MEDICATIONS   diphenhydrAMINE 25 milliGRAM(s) Oral every 6 hours PRN  enoxaparin Injectable 40 milliGRAM(s) SubCutaneous daily  gabapentin 200 milliGRAM(s) Oral every 8 hours  HYDROmorphone   Tablet 4 milliGRAM(s) Oral every 4 hours PRN  HYDROmorphone   Tablet 6 milliGRAM(s) Oral every 4 hours PRN  hydroxychloroquine 200 milliGRAM(s) Oral two times a day  lamoTRIgine 150 milliGRAM(s) Oral daily  lamoTRIgine 25 milliGRAM(s) Oral daily  levothyroxine 75 MICROGram(s) Oral daily  lidocaine   Patch 2 Patch Transdermal daily  LORazepam     Tablet 2 milliGRAM(s) Oral two times a day PRN  melatonin 3 milliGRAM(s) Oral at bedtime  tiZANidine 2 milliGRAM(s) Oral every 6 hours PRN  ursodiol Capsule 300 milliGRAM(s) Oral two times a day      ----------------------------------------------------------------------------------------  PHYSICAL EXAM- unable to tolerate full MSK exam due to pain.   Constitutional - in some distress due to pain  HEENT - NCAT, EOMI  Neck - Supple, No limited ROM  Chest - no respiratory distress  Cardiovascular - RRR  Abdomen - BS+, Soft, + suprapubic tenderness,  + denson  Extremities -   No calf tenderness + left knee medial tenderness to light palpation, no erythema noted, no warmth. mild edema,  + ttp at left greater trochanteric bursa.  no pain with log roll   Neurologic Exam -                    Cognitive - Awake, Alert, AAO to self, place, date, year, situation     Communication - Fluent, No dysarthria     Cranial Nerves - CN 2-12 intact     Motor - +unable tolerate SLRT on the left, reports left sided pain with SLRT on the right.  unable to fully extend left knee due pain.  fadir and millicent negative on the right, unable to perform on the left due to pain, unable to fully examine L knee for laxity, range, due to pain                     LEFT    UE - ShAB 5/5, EF 5/5, EE 5/5, WE 5/5,  5/5                    RIGHT UE - ShAB 5/5, EF 5/5, EE 5/5, WE 5/5,  5/5                    LEFT    LE - unable to perform hip flexion, knee flexion 2/5 limited by pain,  DF 2/5 (pt states limited by pain), PF 5/5                    RIGHT LE - HF 4/5, KE 4/5, DF 4/5, PF 4/5   (pain on left hip with moving RLE)     Sensory - Intact to LT     Coordination - FTN intact    ----------------------------------------------------------------------------------------  ASSESSMENT/PLAN  59 year old female pmh sjogrens presenting with left hip and knee pain, also with urinary retention.  LS spine, L hip and L knee MRI pending. evaluate for myelopathy, would rule out infection however no leukocytosis, ua pending.   patient has focal hip pain at L trochanteric bursa, tenderness to left medial knee with swelling.   Pt reports concern that symptom due to meniscal pain however exam is limited, denies trauma or history of fall, locking or buckling. pt also being evaluated by rheumatology (unclear if symptoms related to sjogrens)    Pain -gabapentin, lidocaine, dilaudid 6mg po q 4 prn, zanaflex  can try ice if tolerates  DVT PPX - lovenox  recommend PT/OT if able to tolerate, ROM.  patient is pending MRI

## 2020-06-05 NOTE — PROCEDURE NOTE - ADDITIONAL PROCEDURE DETAILS
Given that patient was retaining > 500cc and difficulty performing straight catheterization, please leave denson in place for at least 48hrs, pt having soft BMs and ambulating.

## 2020-06-05 NOTE — PROVIDER CONTACT NOTE (OTHER) - ASSESSMENT
Pt appears to be in pain. Pt Pt states they have 10/10 pain. Pt denies chest pain, dizziness or shortness of breath. Pt screaming, crying and continually stating they are in pain.

## 2020-06-05 NOTE — CONSULT NOTE ADULT - SUBJECTIVE AND OBJECTIVE BOX
MARINO CHENTE  [id number]    HISTORY OF PRESENT ILLNESS:  58 y/o F with PMH of Sjogren's, Raynaud's, Chilblain's, Primary Biliary sclerosis, hypothyroidism, Anemia, Asthma, HLD presents with progressively worsening left knee and left hip pain over last 2 weeks days. Rheumatology was called to evaluate for current joint pain.     Pt reports was suffering from left hip and knee pain x 2 weeks however on the day of admission, pain was 10/10 located over lateral left hip and lower back with radiation to left knee. Patient unable to bare weight or tolerate ambulation secondary to pain. ROM of both knee and hip limited due to pain. 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  Pt denies trauma, fall or injury. She denied any fever, chills, cough, SOB, chest pain, SOB, abdominal pain, N/V/D/C  but she reports urinary retention and feels relief after folly inserted by Urology team.    Pt reports she follows with Dr Gerald Vasquez at [facility] with primary sjogren ( +ve SSA SSB SILVERIO as per pt) for many years and has been maintained on HCQ and artificial tears.   Pt denies  any skin rash except for chilblain's and Raynaud's which improved on Diltiazem.    PAST MEDICAL & SURGICAL HISTORY:  Autoimmune skin disease  Neuropathy  Hypothyroid  Sjogren's syndrome  Primary biliary cirrhosis  Asthma  S/P appendectomy  S/P knee surgery  S/P tonsillectomy  S/P cholecystectomy      Review of Systems:  Gen:  No fevers/chills, weight loss  HEENT: No blurry vision, no difficulty swallowing, no oral or nasal ulcers  CVS: No chest pain/palpitations  Resp: No SOB/wheezing  GI: No N/V/C/D/abdominal pain  : as HPI  MSK: as HPI  Skin: No new rashes  Neuro: No headaches    MEDICATIONS  (STANDING):  enoxaparin Injectable 40 milliGRAM(s) SubCutaneous daily  gabapentin 200 milliGRAM(s) Oral every 8 hours  hydroxychloroquine 200 milliGRAM(s) Oral two times a day  lamoTRIgine 150 milliGRAM(s) Oral daily  lamoTRIgine 25 milliGRAM(s) Oral daily  levothyroxine 75 MICROGram(s) Oral daily  lidocaine   Patch 2 Patch Transdermal daily  melatonin 3 milliGRAM(s) Oral at bedtime  ursodiol Capsule 300 milliGRAM(s) Oral two times a day    MEDICATIONS  (PRN):  diphenhydrAMINE 25 milliGRAM(s) Oral every 6 hours PRN Rash and/or Itching  HYDROmorphone   Tablet 4 milliGRAM(s) Oral every 4 hours PRN Moderate Pain (4 - 6)  HYDROmorphone   Tablet 6 milliGRAM(s) Oral every 4 hours PRN Severe Pain (7 - 10)  LORazepam     Tablet 2 milliGRAM(s) Oral two times a day PRN Anxiety  tiZANidine 2 milliGRAM(s) Oral every 6 hours PRN Muscle Spasm      Allergies    aspirin (Anaphylaxis)  ibuprofen (Unknown)  Levaquin (Anaphylaxis)  mirtazapine (Angioedema; Rash)  penicillin (Unknown)    Intolerances    PERTINENT MEDICATION HISTORY:  Home Medications:  HYDROmorphone 4 mg oral tablet: 1 tab(s) orally 2-3 times a day ([facility] ref# [id number]: last filled 5/13/19 x #90tabs) (05 Jun 2019 19:39)  lamoTRIgine 150 mg oral tablet: 1 tab(s) orally once a day (Takes with 25mg tablet: Total daily dose 175mg) (05 Jun 2019 19:39)  lamoTRIgine 25 mg oral tablet: 1 tab(s) orally once a day (Takes with 150mg tablet: Total daily dose 175mg) (05 Jun 2019 19:39)  LORazepam 2 mg oral tablet: 1 tab(s) orally 2 times a day, As Needed ([facility] ref# [id number]: Last filled 5/27/19 x #60tabs) (05 Jun 2019 19:39)  Plaquenil 200 mg oral tablet: 1 tab(s) orally 2 times a day (05 Jun 2019 19:39)  Synthroid 75 mcg (0.075 mg) oral tablet: 1 tab(s) orally Sunday through Friday (05 Jun 2019 19:39)  ursodiol 300 mg oral capsule: 1 cap(s) orally 2 times a day (05 Jun 2019 19:39)      SOCIAL HISTORY:   seperated, Non- smoker, no alcohol    FAMILY HISTORY:  Family history of coronary artery disease      Vital Signs Last 24 Hrs  T(C): 36.8 (05 Jun 2020 14:19), Max: 37.2 (04 Jun 2020 17:00)  T(F): 98.3 (05 Jun 2020 14:19), Max: 98.9 (04 Jun 2020 17:00)  HR: 87 (05 Jun 2020 14:19) (69 - 87)  BP: 130/74 (05 Jun 2020 14:19) (124/57 - 148/90)  BP(mean): --  RR: 16 (05 Jun 2020 14:19) (16 - 18)  SpO2: 98% (05 Jun 2020 14:19) (98% - 100%)    Physical Exam:  General: in mild distress due to pain  HEENT: EOMI, MMM  GI: Soft, NT/ND  MSK:  Exam limited due to pain   left knee; with severe tenderness to slight touch, no warmth no large effusion, severely restricted ROM due to pain   left hip: with point tenderness over lat hip , unable to exam Timothy /Higinio test lack of cooperation due to pain  Neuro: AAOx3  Skin: no visible rashes    LABS:                        10.1   7.46  )-----------( 332      ( 05 Jun 2020 05:40 )             33.9     06-05    133<L>  |  96<L>  |  11  ----------------------------<  100<H>  3.5   |  25  |  0.69    Ca    9.3      05 Jun 2020 05:40  Phos  3.5     06-05  Mg     2.2     06-05    TPro  6.3  /  Alb  4.3  /  TBili  < 0.2<L>  /  DBili  x   /  AST  12  /  ALT  7   /  AlkPhos  77  06-04    PT/INR - ( 04 Jun 2020 09:23 )   PT: 10.2 SEC;   INR: 0.90          PTT - ( 04 Jun 2020 09:23 )  PTT:27.2 SEC    Sedimentation Rate, Erythrocyte: 16 mm/hr (06.04.20 @ 09:23)      RADIOLOGY & ADDITIONAL STUDIES:    < from: Xray Knee 3 Views, Left (06.04.20 @ 09:05) >  INTERPRETATION:  TIME OF EXAM: June 4, 2020 at 9:04 AM    CLINICAL INFORMATION: Left lower extremity pain    TECHNIQUE:   AP pelvis; AP and lateral views of the left hip; AP, oblique and lateral radiographs of the left knee.    INTERPRETATION:     There is no acute fracture or dislocation. The joint spaces are intact. No joint effusion. Bone density and texture is normal.      COMPARISON:  None available      IMPRESSION:  No acute bone or joint disease.    < end of copied text >    < from: Xray Hip w/ Pelvis 2 or 3 Views, Left (06.04.20 @ 09:05) >    INTERPRETATION:  TIME OF EXAM: June 4, 2020 at 9:04 AM    CLINICAL INFORMATION: Left lower extremity pain    TECHNIQUE:   AP pelvis; AP and lateral views of the left hip; AP, oblique and lateral radiographs of the left knee.    INTERPRETATION:     There is no acute fracture or dislocation. The joint spaces are intact. No joint effusion. Bone density and texture is normal.      COMPARISON:  None available      IMPRESSION:  No acute bone or joint disease.    < end of copied text > MARINO CHENTE  [id number]    HISTORY OF PRESENT ILLNESS:  60 y/o F with PMH of Sjogren's, Raynaud's, Chilblain's, Primary Biliary sclerosis, hypothyroidism, Anemia, Asthma, HLD presents with progressively worsening left knee and left hip pain over last 2 weeks days. Rheumatology was called to evaluate for current joint pain.     Pt reports was suffering from left hip and knee pain x 2 weeks however on the day of admission, pain was 10/10 located over lateral left hip and lower back with radiation to left knee. Patient unable to bare weight or tolerate ambulation secondary to pain. ROM of both knee and hip limited due to pain. 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  Pt denies trauma, fall or injury. She denied any fever, chills, cough, SOB, chest pain, SOB, abdominal pain, N/V/D/C  but she reports urinary retention and feels relief after folly inserted by Urology team.    Pt reports she follows with Dr Gerald Vasquez at [facility] with primary sjogren and RA ( +ve SSA SSB SILVERIO as per pt) for many years and has been maintained on HCQ and artificial tears. She also has been treated with RTX ( last infusion 2 years ago, She was due to get a dose just prior to COVID which held due to pandemic)    Pt denies  any skin rash except for chilblain's and Raynaud's which improved on Diltiazem.    PAST MEDICAL & SURGICAL HISTORY:  Autoimmune skin disease  Neuropathy  Hypothyroid  Sjogren's syndrome  Primary biliary cirrhosis  Asthma  S/P appendectomy  S/P knee surgery  S/P tonsillectomy  S/P cholecystectomy      Review of Systems:  Gen:  No fevers/chills, weight loss  HEENT: No blurry vision, no difficulty swallowing, no oral or nasal ulcers  CVS: No chest pain/palpitations  Resp: No SOB/wheezing  GI: No N/V/C/D/abdominal pain  : as HPI  MSK: as HPI  Skin: No new rashes  Neuro: No headaches    MEDICATIONS  (STANDING):  enoxaparin Injectable 40 milliGRAM(s) SubCutaneous daily  gabapentin 200 milliGRAM(s) Oral every 8 hours  hydroxychloroquine 200 milliGRAM(s) Oral two times a day  lamoTRIgine 150 milliGRAM(s) Oral daily  lamoTRIgine 25 milliGRAM(s) Oral daily  levothyroxine 75 MICROGram(s) Oral daily  lidocaine   Patch 2 Patch Transdermal daily  melatonin 3 milliGRAM(s) Oral at bedtime  ursodiol Capsule 300 milliGRAM(s) Oral two times a day    MEDICATIONS  (PRN):  diphenhydrAMINE 25 milliGRAM(s) Oral every 6 hours PRN Rash and/or Itching  HYDROmorphone   Tablet 4 milliGRAM(s) Oral every 4 hours PRN Moderate Pain (4 - 6)  HYDROmorphone   Tablet 6 milliGRAM(s) Oral every 4 hours PRN Severe Pain (7 - 10)  LORazepam     Tablet 2 milliGRAM(s) Oral two times a day PRN Anxiety  tiZANidine 2 milliGRAM(s) Oral every 6 hours PRN Muscle Spasm      Allergies    aspirin (Anaphylaxis)  ibuprofen (Unknown)  Levaquin (Anaphylaxis)  mirtazapine (Angioedema; Rash)  penicillin (Unknown)    Intolerances    PERTINENT MEDICATION HISTORY:  Home Medications:  HYDROmorphone 4 mg oral tablet: 1 tab(s) orally 2-3 times a day ([facility] ref# [id number]: last filled 5/13/19 x #90tabs) (05 Jun 2019 19:39)  lamoTRIgine 150 mg oral tablet: 1 tab(s) orally once a day (Takes with 25mg tablet: Total daily dose 175mg) (05 Jun 2019 19:39)  lamoTRIgine 25 mg oral tablet: 1 tab(s) orally once a day (Takes with 150mg tablet: Total daily dose 175mg) (05 Jun 2019 19:39)  LORazepam 2 mg oral tablet: 1 tab(s) orally 2 times a day, As Needed ([facility] ref# [id number]: Last filled 5/27/19 x #60tabs) (05 Jun 2019 19:39)  Plaquenil 200 mg oral tablet: 1 tab(s) orally 2 times a day (05 Jun 2019 19:39)  Synthroid 75 mcg (0.075 mg) oral tablet: 1 tab(s) orally Sunday through Friday (05 Jun 2019 19:39)  ursodiol 300 mg oral capsule: 1 cap(s) orally 2 times a day (05 Jun 2019 19:39)      SOCIAL HISTORY:   seperated, Non- smoker, no alcohol    FAMILY HISTORY:  Family history of coronary artery disease      Vital Signs Last 24 Hrs  T(C): 36.8 (05 Jun 2020 14:19), Max: 37.2 (04 Jun 2020 17:00)  T(F): 98.3 (05 Jun 2020 14:19), Max: 98.9 (04 Jun 2020 17:00)  HR: 87 (05 Jun 2020 14:19) (69 - 87)  BP: 130/74 (05 Jun 2020 14:19) (124/57 - 148/90)  BP(mean): --  RR: 16 (05 Jun 2020 14:19) (16 - 18)  SpO2: 98% (05 Jun 2020 14:19) (98% - 100%)    Physical Exam:  General: in mild distress due to pain  HEENT: EOMI, MMM  GI: Soft, NT/ND  MSK:  Exam limited due to pain   left knee; with severe tenderness to slight touch, no warmth no large effusion, severely restricted ROM due to pain   left hip: with point tenderness over lat hip , unable to exam Timothy /Higinio test lack of cooperation due to pain  Neuro: AAOx3  Skin: no visible rashes. but hypersthenia over lat thigh and hip and over knee    LABS:                        10.1   7.46  )-----------( 332      ( 05 Jun 2020 05:40 )             33.9     06-05    133<L>  |  96<L>  |  11  ----------------------------<  100<H>  3.5   |  25  |  0.69    Ca    9.3      05 Jun 2020 05:40  Phos  3.5     06-05  Mg     2.2     06-05    TPro  6.3  /  Alb  4.3  /  TBili  < 0.2<L>  /  DBili  x   /  AST  12  /  ALT  7   /  AlkPhos  77  06-04    PT/INR - ( 04 Jun 2020 09:23 )   PT: 10.2 SEC;   INR: 0.90          PTT - ( 04 Jun 2020 09:23 )  PTT:27.2 SEC    Sedimentation Rate, Erythrocyte: 16 mm/hr (06.04.20 @ 09:23)      RADIOLOGY & ADDITIONAL STUDIES:    < from: Xray Knee 3 Views, Left (06.04.20 @ 09:05) >  INTERPRETATION:  TIME OF EXAM: June 4, 2020 at 9:04 AM    CLINICAL INFORMATION: Left lower extremity pain    TECHNIQUE:   AP pelvis; AP and lateral views of the left hip; AP, oblique and lateral radiographs of the left knee.    INTERPRETATION:     There is no acute fracture or dislocation. The joint spaces are intact. No joint effusion. Bone density and texture is normal.      COMPARISON:  None available      IMPRESSION:  No acute bone or joint disease.    < end of copied text >    < from: Xray Hip w/ Pelvis 2 or 3 Views, Left (06.04.20 @ 09:05) >    INTERPRETATION:  TIME OF EXAM: June 4, 2020 at 9:04 AM    CLINICAL INFORMATION: Left lower extremity pain    TECHNIQUE:   AP pelvis; AP and lateral views of the left hip; AP, oblique and lateral radiographs of the left knee.    INTERPRETATION:     There is no acute fracture or dislocation. The joint spaces are intact. No joint effusion. Bone density and texture is normal.      COMPARISON:  None available      IMPRESSION:  No acute bone or joint disease.    < end of copied text > MARINO CHENTE  [id number]    HISTORY OF PRESENT ILLNESS:  58 y/o F with PMH of Sjogren's, Raynaud's, Chilblain's, Primary Biliary sclerosis, hypothyroidism, Anemia, Asthma, HLD presents with progressively worsening left knee and left hip pain over last 2 weeks days. Rheumatology was called to evaluate for current joint pain.     Pt reports was suffering from left hip and knee pain x 2 weeks however on the day of admission, pain was 10/10 located over lateral left hip and lower back with radiation to left knee. Patient unable to bare weight or tolerate ambulation secondary to pain and is unable to bend or full extend knee and hip due to pain. 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  Pt denies trauma, fall or injury. She denied any fever, chills, cough, SOB, chest pain, SOB, abdominal pain, N/V/D/C  but she reports urinary retention and feels relief after folly inserted by Urology team.    Pt reports she follows with Dr Gerald Vasquez at [facility] with primary sjogren and RA ( +ve SSA SSB SILVERIO as per pt) for many years and has been maintained on HCQ and artificial tears. She also has been treated with RTX ( last infusion 2 years ago, She was due to get a dose just prior to COVID which held due to pandemic)    Pt denies  any skin rash except for chilblain's and Raynaud's which improved on Diltiazem. Denies trauma.   rheumatic ros is otherwise negative.    PAST MEDICAL & SURGICAL HISTORY:  Autoimmune skin disease  Neuropathy  Hypothyroid  Sjogren's syndrome  Primary biliary cirrhosis  Asthma  S/P appendectomy  S/P knee surgery  S/P tonsillectomy  S/P cholecystectomy      Review of Systems:  Gen:  No fevers/chills, weight loss  HEENT: No blurry vision, no difficulty swallowing, no oral or nasal ulcers  CVS: No chest pain/palpitations  Resp: No SOB/wheezing  GI: No N/V/C/D/abdominal pain  : as HPI  MSK: as HPI  Skin: No new rashes  Neuro: No headaches    MEDICATIONS  (STANDING):  enoxaparin Injectable 40 milliGRAM(s) SubCutaneous daily  gabapentin 200 milliGRAM(s) Oral every 8 hours  hydroxychloroquine 200 milliGRAM(s) Oral two times a day  lamoTRIgine 150 milliGRAM(s) Oral daily  lamoTRIgine 25 milliGRAM(s) Oral daily  levothyroxine 75 MICROGram(s) Oral daily  lidocaine   Patch 2 Patch Transdermal daily  melatonin 3 milliGRAM(s) Oral at bedtime  ursodiol Capsule 300 milliGRAM(s) Oral two times a day    MEDICATIONS  (PRN):  diphenhydrAMINE 25 milliGRAM(s) Oral every 6 hours PRN Rash and/or Itching  HYDROmorphone   Tablet 4 milliGRAM(s) Oral every 4 hours PRN Moderate Pain (4 - 6)  HYDROmorphone   Tablet 6 milliGRAM(s) Oral every 4 hours PRN Severe Pain (7 - 10)  LORazepam     Tablet 2 milliGRAM(s) Oral two times a day PRN Anxiety  tiZANidine 2 milliGRAM(s) Oral every 6 hours PRN Muscle Spasm      Allergies    aspirin (Anaphylaxis)  ibuprofen (Unknown)  Levaquin (Anaphylaxis)  mirtazapine (Angioedema; Rash)  penicillin (Unknown)    Intolerances    PERTINENT MEDICATION HISTORY:  Home Medications:  HYDROmorphone 4 mg oral tablet: 1 tab(s) orally 2-3 times a day ([facility] ref# [id number]: last filled 5/13/19 x #90tabs) (05 Jun 2019 19:39)  lamoTRIgine 150 mg oral tablet: 1 tab(s) orally once a day (Takes with 25mg tablet: Total daily dose 175mg) (05 Jun 2019 19:39)  lamoTRIgine 25 mg oral tablet: 1 tab(s) orally once a day (Takes with 150mg tablet: Total daily dose 175mg) (05 Jun 2019 19:39)  LORazepam 2 mg oral tablet: 1 tab(s) orally 2 times a day, As Needed ([facility] ref# [id number]: Last filled 5/27/19 x #60tabs) (05 Jun 2019 19:39)  Plaquenil 200 mg oral tablet: 1 tab(s) orally 2 times a day (05 Jun 2019 19:39)  Synthroid 75 mcg (0.075 mg) oral tablet: 1 tab(s) orally Sunday through Friday (05 Jun 2019 19:39)  ursodiol 300 mg oral capsule: 1 cap(s) orally 2 times a day (05 Jun 2019 19:39)      SOCIAL HISTORY:   seperated, Non- smoker, no alcohol    FAMILY HISTORY:  Family history of coronary artery disease      Vital Signs Last 24 Hrs  T(C): 36.8 (05 Jun 2020 14:19), Max: 37.2 (04 Jun 2020 17:00)  T(F): 98.3 (05 Jun 2020 14:19), Max: 98.9 (04 Jun 2020 17:00)  HR: 87 (05 Jun 2020 14:19) (69 - 87)  BP: 130/74 (05 Jun 2020 14:19) (124/57 - 148/90)  BP(mean): --  RR: 16 (05 Jun 2020 14:19) (16 - 18)  SpO2: 98% (05 Jun 2020 14:19) (98% - 100%)    Physical Exam:  General: in mild distress due to pain  HEENT: EOMI, MMM, sclera white  GI: Soft, NT/ND  MSK:  Exam limited due to pain   left knee; with severe tenderness to slight touch, no warmth no large effusion, severely restricted ROM in both flexion and extension due to pain   left hip: with point tenderness over lat hip , unable to exam Timothy /Higinio test lack of cooperation due to pain  Neuro: AAOx3 - strength difficult to assess 2/2 level of pain during exam throughout  Skin: no visible rashes. but hypersthenia over lat thigh and hip and over knee.  no livedo    LABS:                        10.1   7.46  )-----------( 332      ( 05 Jun 2020 05:40 )             33.9     06-05    133<L>  |  96<L>  |  11  ----------------------------<  100<H>  3.5   |  25  |  0.69    Ca    9.3      05 Jun 2020 05:40  Phos  3.5     06-05  Mg     2.2     06-05    TPro  6.3  /  Alb  4.3  /  TBili  < 0.2<L>  /  DBili  x   /  AST  12  /  ALT  7   /  AlkPhos  77  06-04    PT/INR - ( 04 Jun 2020 09:23 )   PT: 10.2 SEC;   INR: 0.90          PTT - ( 04 Jun 2020 09:23 )  PTT:27.2 SEC    Sedimentation Rate, Erythrocyte: 16 mm/hr (06.04.20 @ 09:23)      RADIOLOGY & ADDITIONAL STUDIES:    < from: Xray Knee 3 Views, Left (06.04.20 @ 09:05) >  INTERPRETATION:  TIME OF EXAM: June 4, 2020 at 9:04 AM    CLINICAL INFORMATION: Left lower extremity pain    TECHNIQUE:   AP pelvis; AP and lateral views of the left hip; AP, oblique and lateral radiographs of the left knee.    INTERPRETATION:     There is no acute fracture or dislocation. The joint spaces are intact. No joint effusion. Bone density and texture is normal.      COMPARISON:  None available      IMPRESSION:  No acute bone or joint disease.    < end of copied text >    < from: Xray Hip w/ Pelvis 2 or 3 Views, Left (06.04.20 @ 09:05) >    INTERPRETATION:  TIME OF EXAM: June 4, 2020 at 9:04 AM    CLINICAL INFORMATION: Left lower extremity pain    TECHNIQUE:   AP pelvis; AP and lateral views of the left hip; AP, oblique and lateral radiographs of the left knee.    INTERPRETATION:     There is no acute fracture or dislocation. The joint spaces are intact. No joint effusion. Bone density and texture is normal.      COMPARISON:  None available      IMPRESSION:  No acute bone or joint disease.    < end of copied text >

## 2020-06-05 NOTE — PROVIDER CONTACT NOTE (OTHER) - ASSESSMENT
patient yelling in pain, states she's in excrutiating pain in her back. patient denies pain in vaginal area during attempt to straight cath. states the pain is in her back.

## 2020-06-05 NOTE — CONSULT NOTE ADULT - SUBJECTIVE AND OBJECTIVE BOX
Chief Complaint:    HPI:  · HPI Objective Statement: 59y Female with PMHx of sjorgens, primary biliary cirrhosis, HLD presents with left knee and hip pain over several days, awoke this morning with escalation or pain.  . Pain progressively worsened over the last day. This morning, pt reports 10/10 achy pain that starts in the L hip/back and radiates to the knee. Pain is described as constant pressure, with pain the worst in the hip. Patient unable to bare weight or tolerate ambulation secondary to pain. Pt able to move joint with severe discomfort. Pt took Dilaudid 8mg with minimal relief (typically takes 4mg Dilaudid for primary biliary cholangitis). Pt reports episode in the past but never this severe. Pt denies trauma or injurydenied fever, chills, CP, SOB, abdominal pain, diarrhea, pain with urination or increased frequency. (04 Jun 2020 18:04)      PAST MEDICAL & SURGICAL HISTORY:  Autoimmune skin disease  Neuropathy  Hypothyroid  Sjogren's syndrome  Primary biliary cirrhosis  Asthma  S/P appendectomy  S/P knee surgery  S/P tonsillectomy  S/P cholecystectomy      FAMILY HISTORY:  Family history of coronary artery disease      SOCIAL HISTORY:  [ ] Denies Smoking, Alcohol, or Drug Use    Allergies    aspirin (Anaphylaxis)  ibuprofen (Unknown)  Levaquin (Anaphylaxis)  mirtazapine (Angioedema; Rash)  penicillin (Unknown)    Intolerances        PAIN MEDICATIONS:  diphenhydrAMINE 25 milliGRAM(s) Oral every 6 hours PRN  gabapentin 300 milliGRAM(s) Oral every 8 hours  HYDROmorphone  Injectable 2 milliGRAM(s) IV Push every 6 hours PRN  lamoTRIgine 150 milliGRAM(s) Oral daily  lamoTRIgine 25 milliGRAM(s) Oral daily  LORazepam     Tablet 2 milliGRAM(s) Oral two times a day PRN  melatonin 3 milliGRAM(s) Oral at bedtime      Heme:  enoxaparin Injectable 40 milliGRAM(s) SubCutaneous daily    Antibiotics:  hydroxychloroquine 200 milliGRAM(s) Oral two times a day    Cardiovascular:    GI:  ursodiol Capsule 300 milliGRAM(s) Oral two times a day    Endocrine:  levothyroxine 75 MICROGram(s) Oral daily    All Other Medications:      REVIEW OF SYSTEMS:    CONSTITUTIONAL: No fever, weight loss, or fatigue  EYES: No eye pain, visual disturbances, or discharge  ENMT:  No difficulty hearing, tinnitus, vertigo; No sinus or throat pain  NECK: No pain or stiffness  BREASTS: No pain, masses, or nipple discharge  RESPIRATORY: No cough, wheezing, chills or hemoptysis; No shortness of breath  CARDIOVASCULAR: No chest pain, palpitations, dizziness, or leg swelling  GASTROINTESTINAL: No abdominal or epigastric pain. No nausea, vomiting, or hematemesis; No diarrhea or constipation. No melena or hematochezia.  GENITOURINARY: No dysuria, frequency, hematuria, or incontinence  NEUROLOGICAL: No headaches, memory loss, loss of strength, numbness, or tremors  SKIN: No itching, burning, rashes, or lesions   LYMPH NODES: No enlarged glands  ENDOCRINE: No heat or cold intolerance; No hair loss  MUSCULOSKELETAL: No joint pain or swelling; No muscle, back, or extremity pain  PSYCHIATRIC: No depression, anxiety, mood swings, or difficulty sleeping  HEME/LYMPH: No easy bruising, or bleeding gums  ALLERY AND IMMUNOLOGIC: No hives or eczema      Vital Signs Last 24 Hrs  T(C): 36.8 (05 Jun 2020 11:25), Max: 37.2 (04 Jun 2020 17:00)  T(F): 98.2 (05 Jun 2020 11:25), Max: 98.9 (04 Jun 2020 17:00)  HR: 83 (05 Jun 2020 11:25) (69 - 85)  BP: 148/90 (05 Jun 2020 11:25) (124/57 - 148/90)  BP(mean): --  RR: 16 (05 Jun 2020 11:25) (16 - 18)  SpO2: 100% (05 Jun 2020 11:25) (100% - 100%)    PAIN SCORE:         SCALE USED: (1-10 VNRS)             PHYSICAL EXAM:    GENERAL: NAD, well-groomed, well-developed  HEAD:  Atraumatic, Normocephalic  EYES: EOMI, PERRLA, conjunctiva and sclera clear  ENMT: No tonsillar erythema, exudates, or enlargement; Moist mucous membranes, Good dentition, No lesions  NECK: Supple, No JVD, Normal thyroid  NERVOUS SYSTEM:  Alert & Oriented X3, Good concentration; Motor Strength 5/5 B/L upper and lower extremities; DTRs 2+ intact and symmetric  CHEST/LUNG: Clear to percussion bilaterally; No rales, rhonchi, wheezing, or rubs  HEART: Regular rate and rhythm; No murmurs, rubs, or gallops  ABDOMEN: Soft, Nontender, Nondistended; Bowel sounds present  EXTREMITIES:  2+ Peripheral Pulses, No clubbing, cyanosis, or edema  LYMPH: No lymphadenopathy noted  SKIN: No rashes or lesions        LABS:                          10.1   7.46  )-----------( 332      ( 05 Jun 2020 05:40 )             33.9     06-05    133<L>  |  96<L>  |  11  ----------------------------<  100<H>  3.5   |  25  |  0.69    Ca    9.3      05 Jun 2020 05:40  Phos  3.5     06-05  Mg     2.2     06-05    TPro  6.3  /  Alb  4.3  /  TBili  < 0.2<L>  /  DBili  x   /  AST  12  /  ALT  7   /  AlkPhos  77  06-04    PT/INR - ( 04 Jun 2020 09:23 )   PT: 10.2 SEC;   INR: 0.90          PTT - ( 04 Jun 2020 09:23 )  PTT:27.2 SEC      Comments: Pt. complaining of pain 6/10 in left hip and knee, describes knee pain as throbbing and hip pain as muscles being stretched. Pt. admits to taking Dilaudid 4mg q 4hrs at home for biliary cirrhosis. Pt. also states IV dilaudid last about 4 hrs each dose. Pt. A&Ox3, NAD, laying in bed, not able to bear weight on affected side. Answers all questions appropriately and maintains eye contact. Discussed with prescribing MD (Dr. Thom Lr) pain plan and agrees.            RECOMMENDATIONS  1. D/C IV dilaudid, add PO Dilaudid 4mg for moderate pain and 6mg for severe pain q4hrs PRN.   2. Increase gabapentin to 200mg q8hrs   3. Add tylenol 975mg q6hrs standing for two days   4. Consider adding lidoderm patch to affected area   5. Add Tizanidine 2mg q6hrs PRN for muscle spasm/pain. Hold for oversedation and bp under 90/50. (Non-formula meds, must call pharm)  6. Have pain f/u with outside pain management MD as outpatient     [X ]  [place]  Reviewed and Copied to Chart

## 2020-06-05 NOTE — PROVIDER CONTACT NOTE (OTHER) - ASSESSMENT
Pt appears to be in pain. Pt Pt states they have 7/10 pain. Pt denies chest pain, dizziness or shortness of breath.

## 2020-06-05 NOTE — PROVIDER CONTACT NOTE (OTHER) - BACKGROUND
Pt is a 59 yr old female with pmhx of hypothyroidism, Sjorgens syndrome and primary biliary cirrhosis.

## 2020-06-05 NOTE — PROVIDER CONTACT NOTE (OTHER) - ASSESSMENT
Pt noted to have trouble voiding. Pt bladder noted to be distended and upon palpation, firmness noted.

## 2020-06-05 NOTE — PROGRESS NOTE ADULT - SUBJECTIVE AND OBJECTIVE BOX
Patient is a 59y old  Female who presents with a chief complaint of left knee and hip pain (04 Jun 2020 18:04)      INTERVAL HPI/OVERNIGHT EVENTS: pt writhing in pain, like her body would explode     MEDICATIONS  (STANDING):  hydroxychloroquine 200 milliGRAM(s) Oral two times a day  lamoTRIgine 150 milliGRAM(s) Oral daily  lamoTRIgine 25 milliGRAM(s) Oral daily  levothyroxine 75 MICROGram(s) Oral daily  melatonin 3 milliGRAM(s) Oral at bedtime  ursodiol Capsule 300 milliGRAM(s) Oral two times a day    MEDICATIONS  (PRN):  HYDROmorphone  Injectable 2 milliGRAM(s) IV Push every 6 hours PRN Severe Pain (7 - 10)  LORazepam     Tablet 2 milliGRAM(s) Oral two times a day PRN Anxiety          Allergies    aspirin (Anaphylaxis)  ibuprofen (Unknown)  Levaquin (Anaphylaxis)  mirtazapine (Angioedema; Rash)  penicillin (Unknown)    Intolerances        REVIEW OF SYSTEMS:  CARDIOVASCULAR: No chest pain, palpitations, dizziness, or leg swelling; no shortness of breath     RESPIRATORY: No cough, wheezing, chills or hemoptysis; No shortness of breath    GASTROINTESTINAL: No abdominal or epigastric pain. No nausea, vomiting, or hematemesis; No diarrhea or constipation. No melena or hematochezia.    NEUROLOGICAL: No headaches, memory loss, loss of strength, numbness    - urinary retetntion   PHYSICAL EXAM:  Vital Signs Last 24 Hrs  T(C): 37.1 (05 Jun 2020 07:02), Max: 37.2 (04 Jun 2020 17:00)  T(F): 98.7 (05 Jun 2020 07:02), Max: 98.9 (04 Jun 2020 17:00)  HR: 83 (05 Jun 2020 07:35) (69 - 91)  BP: 132/89 (05 Jun 2020 07:35) (117/67 - 139/90)  BP(mean): --  RR: 18 (05 Jun 2020 07:02) (17 - 18)  SpO2: 100% (05 Jun 2020 07:02) (99% - 100%)    GENERAL: NAD, well-groomed, well-developed  HEAD:  Atraumatic, Normocephalic  EYES: EOMI, PERRLA, conjunctiva and sclera clear  NECK: Supple, No JVD, Normal thyroid  NERVOUS SYSTEM:  Alert & Oriented X3, Good concentration;  and symmetric  CHEST/LUNG: Clear to auscultation bilaterally; No rales, rhonchi, wheezing, or rubs  HEART: S1S2 regular, without murmur, rub nor gallop  ABDOMEN: Soft, Nontender, Nondistended; Bowel sounds present  bladder distended to [id number]   EXTREMITIES:  2+ Peripheral Pulses, No clubbing, cyanosis, or edema      LABS:                        10.1   7.46  )-----------( 332      ( 05 Jun 2020 05:40 )             33.9     05 Jun 2020 05:40    133    |  96     |  11     ----------------------------<  100    3.5     |  25     |  0.69     Ca    9.3        05 Jun 2020 05:40  Phos  3.5       05 Jun 2020 05:40  Mg     2.2       05 Jun 2020 05:40    TPro  6.3    /  Alb  4.3    /  TBili  < 0.2  /  DBili  x      /  AST  12     /  ALT  7      /  AlkPhos  77     04 Jun 2020 09:33    PT/INR - ( 04 Jun 2020 09:23 )   PT: 10.2 SEC;   INR: 0.90          PTT - ( 04 Jun 2020 09:23 )  PTT:27.2 SEC  CAPILLARY BLOOD GLUCOSE    Assessment and Plan:   Problem/Plan - 1:  ·  Problem: Acute pain of left knee.  Plan: pain management   MRI left knee and hip   Pain management   Trial of gabipentin     Problem/Plan - 2:  ·  Problem: Autoimmune skin disease.  rheum eval.  Is skin burning a manifestation?     Problem/Plan - 3:  ·  Problem: Hypothyroidism, unspecified type.  Plan: continue dose, pt claims compliance.   tft's ordered.     Problem/Plan - 4:  ·  Problem: Sjogren's syndrome without extraglandular involvement.  Plan: Normal inflamatory markers.  Doubt cause of pain.     Problem/Plan - 5:  ·  Problem: Primary biliary cirrhosis.  Plan: Normal liver function  Mild pruritis.       Attending Statement:  45 minutes spent on total encounter; more than 50% of the visit was spent counseling and/or coordinating care by the attending physician.

## 2020-06-05 NOTE — PROCEDURE NOTE - NSPROCDETAILS_GEN_ALL_CORE
sterile technique, non-indwelling urinary device inserted/a urinary catheter insertion kit was used for all insertion materials/sterile technique, old cysto removed, new tube inserted/prior to placement, an active physician order for the placement of a urinary catheter was verified/sterile technique, indwelling urinary device inserted/kit not available for this size catheter

## 2020-06-06 LAB
ANION GAP SERPL CALC-SCNC: 12 MMO/L — SIGNIFICANT CHANGE UP (ref 7–14)
BASOPHILS # BLD AUTO: 0.05 K/UL — SIGNIFICANT CHANGE UP (ref 0–0.2)
BASOPHILS NFR BLD AUTO: 0.7 % — SIGNIFICANT CHANGE UP (ref 0–2)
BUN SERPL-MCNC: 14 MG/DL — SIGNIFICANT CHANGE UP (ref 7–23)
CALCIUM SERPL-MCNC: 9.4 MG/DL — SIGNIFICANT CHANGE UP (ref 8.4–10.5)
CHLORIDE SERPL-SCNC: 100 MMOL/L — SIGNIFICANT CHANGE UP (ref 98–107)
CO2 SERPL-SCNC: 24 MMOL/L — SIGNIFICANT CHANGE UP (ref 22–31)
CREAT SERPL-MCNC: 0.71 MG/DL — SIGNIFICANT CHANGE UP (ref 0.5–1.3)
EOSINOPHIL # BLD AUTO: 0.27 K/UL — SIGNIFICANT CHANGE UP (ref 0–0.5)
EOSINOPHIL NFR BLD AUTO: 3.5 % — SIGNIFICANT CHANGE UP (ref 0–6)
GLUCOSE SERPL-MCNC: 123 MG/DL — HIGH (ref 70–99)
HCT VFR BLD CALC: 36.4 % — SIGNIFICANT CHANGE UP (ref 34.5–45)
HGB BLD-MCNC: 10.7 G/DL — LOW (ref 11.5–15.5)
IMM GRANULOCYTES NFR BLD AUTO: 0.3 % — SIGNIFICANT CHANGE UP (ref 0–1.5)
LYMPHOCYTES # BLD AUTO: 1.07 K/UL — SIGNIFICANT CHANGE UP (ref 1–3.3)
LYMPHOCYTES # BLD AUTO: 14 % — SIGNIFICANT CHANGE UP (ref 13–44)
MAGNESIUM SERPL-MCNC: 2.1 MG/DL — SIGNIFICANT CHANGE UP (ref 1.6–2.6)
MCHC RBC-ENTMCNC: 22.2 PG — LOW (ref 27–34)
MCHC RBC-ENTMCNC: 29.4 % — LOW (ref 32–36)
MCV RBC AUTO: 75.7 FL — LOW (ref 80–100)
MONOCYTES # BLD AUTO: 0.7 K/UL — SIGNIFICANT CHANGE UP (ref 0–0.9)
MONOCYTES NFR BLD AUTO: 9.1 % — SIGNIFICANT CHANGE UP (ref 2–14)
NEUTROPHILS # BLD AUTO: 5.56 K/UL — SIGNIFICANT CHANGE UP (ref 1.8–7.4)
NEUTROPHILS NFR BLD AUTO: 72.4 % — SIGNIFICANT CHANGE UP (ref 43–77)
NRBC # FLD: 0 K/UL — SIGNIFICANT CHANGE UP (ref 0–0)
PHOSPHATE SERPL-MCNC: 3.6 MG/DL — SIGNIFICANT CHANGE UP (ref 2.5–4.5)
PLATELET # BLD AUTO: 377 K/UL — SIGNIFICANT CHANGE UP (ref 150–400)
PMV BLD: 10.5 FL — SIGNIFICANT CHANGE UP (ref 7–13)
POTASSIUM SERPL-MCNC: 3.9 MMOL/L — SIGNIFICANT CHANGE UP (ref 3.5–5.3)
POTASSIUM SERPL-SCNC: 3.9 MMOL/L — SIGNIFICANT CHANGE UP (ref 3.5–5.3)
RBC # BLD: 4.81 M/UL — SIGNIFICANT CHANGE UP (ref 3.8–5.2)
RBC # FLD: 16.5 % — HIGH (ref 10.3–14.5)
SODIUM SERPL-SCNC: 136 MMOL/L — SIGNIFICANT CHANGE UP (ref 135–145)
WBC # BLD: 7.67 K/UL — SIGNIFICANT CHANGE UP (ref 3.8–10.5)
WBC # FLD AUTO: 7.67 K/UL — SIGNIFICANT CHANGE UP (ref 3.8–10.5)

## 2020-06-06 PROCEDURE — 72148 MRI LUMBAR SPINE W/O DYE: CPT | Mod: 26

## 2020-06-06 PROCEDURE — 73721 MRI JNT OF LWR EXTRE W/O DYE: CPT | Mod: 26,LT

## 2020-06-06 RX ORDER — SENNA PLUS 8.6 MG/1
2 TABLET ORAL AT BEDTIME
Refills: 0 | Status: DISCONTINUED | OUTPATIENT
Start: 2020-06-06 | End: 2020-06-11

## 2020-06-06 RX ORDER — ACETAMINOPHEN 500 MG
975 TABLET ORAL EVERY 6 HOURS
Refills: 0 | Status: COMPLETED | OUTPATIENT
Start: 2020-06-06 | End: 2020-06-08

## 2020-06-06 RX ORDER — POLYETHYLENE GLYCOL 3350 17 G/17G
17 POWDER, FOR SOLUTION ORAL DAILY
Refills: 0 | Status: DISCONTINUED | OUTPATIENT
Start: 2020-06-06 | End: 2020-06-11

## 2020-06-06 RX ADMIN — LIDOCAINE 2 PATCH: 4 CREAM TOPICAL at 03:18

## 2020-06-06 RX ADMIN — HYDROMORPHONE HYDROCHLORIDE 6 MILLIGRAM(S): 2 INJECTION INTRAMUSCULAR; INTRAVENOUS; SUBCUTANEOUS at 13:50

## 2020-06-06 RX ADMIN — HYDROMORPHONE HYDROCHLORIDE 6 MILLIGRAM(S): 2 INJECTION INTRAMUSCULAR; INTRAVENOUS; SUBCUTANEOUS at 09:46

## 2020-06-06 RX ADMIN — LAMOTRIGINE 150 MILLIGRAM(S): 25 TABLET, ORALLY DISINTEGRATING ORAL at 17:25

## 2020-06-06 RX ADMIN — URSODIOL 300 MILLIGRAM(S): 250 TABLET, FILM COATED ORAL at 17:24

## 2020-06-06 RX ADMIN — ENOXAPARIN SODIUM 40 MILLIGRAM(S): 100 INJECTION SUBCUTANEOUS at 17:25

## 2020-06-06 RX ADMIN — GABAPENTIN 200 MILLIGRAM(S): 400 CAPSULE ORAL at 13:05

## 2020-06-06 RX ADMIN — Medication 3 MILLIGRAM(S): at 22:29

## 2020-06-06 RX ADMIN — Medication 200 MILLIGRAM(S): at 05:14

## 2020-06-06 RX ADMIN — TIZANIDINE 2 MILLIGRAM(S): 4 TABLET ORAL at 11:12

## 2020-06-06 RX ADMIN — Medication 975 MILLIGRAM(S): at 23:48

## 2020-06-06 RX ADMIN — POLYETHYLENE GLYCOL 3350 17 GRAM(S): 17 POWDER, FOR SOLUTION ORAL at 22:29

## 2020-06-06 RX ADMIN — Medication 975 MILLIGRAM(S): at 17:24

## 2020-06-06 RX ADMIN — TIZANIDINE 2 MILLIGRAM(S): 4 TABLET ORAL at 23:47

## 2020-06-06 RX ADMIN — Medication 975 MILLIGRAM(S): at 11:09

## 2020-06-06 RX ADMIN — LAMOTRIGINE 25 MILLIGRAM(S): 25 TABLET, ORALLY DISINTEGRATING ORAL at 17:25

## 2020-06-06 RX ADMIN — SENNA PLUS 2 TABLET(S): 8.6 TABLET ORAL at 22:29

## 2020-06-06 RX ADMIN — TIZANIDINE 2 MILLIGRAM(S): 4 TABLET ORAL at 17:26

## 2020-06-06 RX ADMIN — GABAPENTIN 200 MILLIGRAM(S): 400 CAPSULE ORAL at 05:13

## 2020-06-06 RX ADMIN — HYDROMORPHONE HYDROCHLORIDE 6 MILLIGRAM(S): 2 INJECTION INTRAMUSCULAR; INTRAVENOUS; SUBCUTANEOUS at 22:28

## 2020-06-06 RX ADMIN — Medication 75 MICROGRAM(S): at 05:17

## 2020-06-06 RX ADMIN — URSODIOL 300 MILLIGRAM(S): 250 TABLET, FILM COATED ORAL at 05:20

## 2020-06-06 RX ADMIN — GABAPENTIN 200 MILLIGRAM(S): 400 CAPSULE ORAL at 22:29

## 2020-06-06 RX ADMIN — HYDROMORPHONE HYDROCHLORIDE 6 MILLIGRAM(S): 2 INJECTION INTRAMUSCULAR; INTRAVENOUS; SUBCUTANEOUS at 18:22

## 2020-06-06 RX ADMIN — TIZANIDINE 2 MILLIGRAM(S): 4 TABLET ORAL at 05:13

## 2020-06-06 RX ADMIN — HYDROMORPHONE HYDROCHLORIDE 6 MILLIGRAM(S): 2 INJECTION INTRAMUSCULAR; INTRAVENOUS; SUBCUTANEOUS at 05:14

## 2020-06-06 RX ADMIN — LIDOCAINE 2 PATCH: 4 CREAM TOPICAL at 13:04

## 2020-06-06 RX ADMIN — Medication 200 MILLIGRAM(S): at 17:24

## 2020-06-06 NOTE — PROGRESS NOTE ADULT - SUBJECTIVE AND OBJECTIVE BOX
Patient is a 59y old  Female who presents with a chief complaint of left knee and hip pain (05 Jun 2020 15:20)      INTERVAL HPI/OVERNIGHT EVENTS: non, feels mildly better    MEDICATIONS  (STANDING):  enoxaparin Injectable 40 milliGRAM(s) SubCutaneous daily  gabapentin 200 milliGRAM(s) Oral every 8 hours  hydroxychloroquine 200 milliGRAM(s) Oral two times a day  lamoTRIgine 150 milliGRAM(s) Oral daily  lamoTRIgine 25 milliGRAM(s) Oral daily  levothyroxine 75 MICROGram(s) Oral daily  lidocaine   Patch 2 Patch Transdermal daily  melatonin 3 milliGRAM(s) Oral at bedtime  ursodiol Capsule 300 milliGRAM(s) Oral two times a day    MEDICATIONS  (PRN):  diphenhydrAMINE 25 milliGRAM(s) Oral every 6 hours PRN Rash and/or Itching  HYDROmorphone   Tablet 4 milliGRAM(s) Oral every 4 hours PRN Moderate Pain (4 - 6)  HYDROmorphone   Tablet 6 milliGRAM(s) Oral every 4 hours PRN Severe Pain (7 - 10)  LORazepam     Tablet 2 milliGRAM(s) Oral two times a day PRN Anxiety  tiZANidine 2 milliGRAM(s) Oral every 6 hours PRN Muscle Spasm        Orders last 24 hours:  MR Hip w/wo IV Cont, Left: Routine   Indication: new onset pain  Transport: Wheelchair  Provider's Contact #: [phone] (06-05-20 @ 09:42)  MR Knee No Cont, Left: Routine   Indication: new onset pain  Transport: Wheelchair  Provider's Contact #: [phone] (06-05-20 @ 09:42)  gabapentin: [Known as NEURONTIN]  100 milliGRAM(s), Oral, every 8 hours  Provider's Contact #: [phone] (06-05-20 @ 09:42)  enoxaparin Injectable: [Ordered as LOVENOX]  40 milliGRAM(s), SubCutaneous, daily  Provider's Contact #: [phone] (06-05-20 @ 09:42)  gabapentin: [Known as NEURONTIN]  300 milliGRAM(s), Oral, every 8 hours  Provider's Contact #: [phone] (06-05-20 @ 10:08)  diphenhydrAMINE: [Ordered as BENADRYL]  25 milliGRAM(s), Oral, every 6 hours, PRN for Rash and/or Itching  Administration Instructions: This is a Look-alike/Sound-alike Medication  Provider's Contact #: [phone] (06-05-20 @ 10:16)  HYDROmorphone  Injectable: [Ordered as DILAUDID Injectable]  1 milliGRAM(s), IV Push, once, Stop After 1 Doses  Administration Instructions: This is a Look-alike/Sound-alike Medication  Provider's Contact #: [phone] (06-05-20 @ 11:20)  Complete Blood Count + Automated Diff: AM Sched. Collection: 06-Jun-2020 06:00 (06-05-20 @ 11:55)  Basic Metabolic Panel w/Mg & Inorg Phos: AM Sched. Collection: 06-Jun-2020 06:00 (06-05-20 @ 11:55)  lidocaine   Patch: [Known as LIDODERM]  2 Patch, Transdermal, daily  Special Instructions: one to knee, one to hip  Administration Instructions: Patch may remain in place for up to 12 hours in any 24-hour period.  * External Use Only *  Provider's Contact #: [phone] (06-05-20 @ 13:31)  gabapentin: [Known as NEURONTIN]  200 milliGRAM(s), Oral, every 8 hours  Provider's Contact #: [phone] (06-05-20 @ 13:44)  HYDROmorphone   Tablet: [Ordered as DILAUDID]  4 milliGRAM(s), Oral, every 4 hours, PRN for Moderate Pain (4 - 6)  Administration Instructions: This is a Look-alike/Sound-alike Medication  Provider's Contact #: [phone] (06-05-20 @ 13:44)  HYDROmorphone   Tablet: [Ordered as DILAUDID]  6 milliGRAM(s), Oral, every 4 hours, PRN for Severe Pain (7 - 10)  Administration Instructions: This is a Look-alike/Sound-alike Medication  Provider's Contact #: [phone] (06-05-20 @ 13:44)  tiZANidine: [Known as ZANAFLEX]  2 milliGRAM(s), Oral, every 6 hours, PRN for Muscle Spasm  Administration Instructions: This is a Look-alike/Sound-alike Medication  NON-FORMULARY REQUEST: muscle spasm  Provider's Contact #: [phone] (06-05-20 @ 13:46)  Diet, Regular:   Kosher (06-05-20 @ 15:21)  MR Lumbar Spine No Cont: Routine   Indication: pain, urinary retention  Transport: Wheelchair  Provider's Contact #: [phone] (06-05-20 @ 15:29)  MR Lumbar Spine No Cont: Urgent   Indication: Myolopathy, pain  Transport: Wheelchair  Provider's Contact #: [phone] (06-05-20 @ 15:36)      Allergies    aspirin (Anaphylaxis)  ibuprofen (Unknown)  Levaquin (Anaphylaxis)  mirtazapine (Angioedema; Rash)  penicillin (Unknown)    Intolerances        REVIEW OF SYSTEMS:  CARDIOVASCULAR: No chest pain, palpitations, dizziness, or leg swelling; no shortness of breath     RESPIRATORY: No cough, wheezing, chills or hemoptysis; No shortness of breath    GASTROINTESTINAL: No abdominal or epigastric pain. No nausea, vomiting, or hematemesis; No diarrhea or constipation. No melena or hematochezia.    NEUROLOGICAL: No headaches, memory loss, loss of strength, numbness      PHYSICAL EXAM:  Vital Signs Last 24 Hrs  T(C): 36.8 (06 Jun 2020 05:12), Max: 37.1 (05 Jun 2020 07:02)  T(F): 98.2 (06 Jun 2020 05:12), Max: 98.7 (05 Jun 2020 07:02)  HR: 85 (06 Jun 2020 05:12) (82 - 87)  BP: 127/86 (06 Jun 2020 05:12) (123/71 - 148/90)  BP(mean): --  RR: 16 (06 Jun 2020 05:12) (16 - 18)  SpO2: 100% (06 Jun 2020 05:12) (98% - 100%)    GENERAL: NAD, well-groomed, well-developed  HEAD:  Atraumatic, Normocephalic  EYES: EOMI, PERRLA, conjunctiva and sclera clear  NECK: Supple, No JVD, Normal thyroid  NERVOUS SYSTEM:  Alert & Oriented X3, Good concentration;  and symmetric  CHEST/LUNG: Clear to auscultation bilaterally; No rales, rhonchi, wheezing, or rubs  HEART: S1S2 regular, without murmur, rub nor gallop  ABDOMEN: Soft, Nontender, Nondistended; Bowel sounds present  EXTREMITIES:  2+ Peripheral Pulses, No clubbing, cyanosis, or edema      LABS:      Ca    9.3        05 Jun 2020 05:40      PT/INR - ( 04 Jun 2020 09:23 )   PT: 10.2 SEC;   INR: 0.90          PTT - ( 04 Jun 2020 09:23 )  PTT:27.2 SEC  CAPILLARY BLOOD GLUCOSE        Assessment and Plan:   Problem/Plan - 1:  ·  Problem: Acute pain of left knee.  Plan: pain management   MRI left knee and hip; MRI Spine    Pain management   Trial of gabipentin now 300 tid     Problem/Plan - 2:  ·  Problem: Autoimmune skin disease.  rheum eval.     Problem/Plan - 3:  ·  Problem: Hypothyroidism, unspecified type.  Plan: continue dose, pt claims compliance.   tft's ordered.     Problem/Plan - 4:  ·  Problem: Sjogren's syndrome without extraglandular involvement.  Plan: Normal inflamatory markers.  Doubt cause of pain.     Problem/Plan - 5:  ·  Problem: Primary biliary cirrhosis.  Plan: Normal liver function  Mild pruritis.       Consultant(s) Notes Reviewed:  rheum, pain management     Lovonox for DVT prophylaxis.  Physical therapy eval.

## 2020-06-07 LAB
ANION GAP SERPL CALC-SCNC: 14 MMO/L — SIGNIFICANT CHANGE UP (ref 7–14)
BASOPHILS # BLD AUTO: 0.05 K/UL — SIGNIFICANT CHANGE UP (ref 0–0.2)
BASOPHILS NFR BLD AUTO: 0.6 % — SIGNIFICANT CHANGE UP (ref 0–2)
BUN SERPL-MCNC: 19 MG/DL — SIGNIFICANT CHANGE UP (ref 7–23)
CALCIUM SERPL-MCNC: 9.8 MG/DL — SIGNIFICANT CHANGE UP (ref 8.4–10.5)
CHLORIDE SERPL-SCNC: 100 MMOL/L — SIGNIFICANT CHANGE UP (ref 98–107)
CO2 SERPL-SCNC: 25 MMOL/L — SIGNIFICANT CHANGE UP (ref 22–31)
CREAT SERPL-MCNC: 0.83 MG/DL — SIGNIFICANT CHANGE UP (ref 0.5–1.3)
EOSINOPHIL # BLD AUTO: 0.3 K/UL — SIGNIFICANT CHANGE UP (ref 0–0.5)
EOSINOPHIL NFR BLD AUTO: 3.9 % — SIGNIFICANT CHANGE UP (ref 0–6)
GLUCOSE SERPL-MCNC: 107 MG/DL — HIGH (ref 70–99)
HCT VFR BLD CALC: 36.5 % — SIGNIFICANT CHANGE UP (ref 34.5–45)
HGB BLD-MCNC: 10.8 G/DL — LOW (ref 11.5–15.5)
IMM GRANULOCYTES NFR BLD AUTO: 0.5 % — SIGNIFICANT CHANGE UP (ref 0–1.5)
LYMPHOCYTES # BLD AUTO: 1.62 K/UL — SIGNIFICANT CHANGE UP (ref 1–3.3)
LYMPHOCYTES # BLD AUTO: 20.9 % — SIGNIFICANT CHANGE UP (ref 13–44)
MAGNESIUM SERPL-MCNC: 2.1 MG/DL — SIGNIFICANT CHANGE UP (ref 1.6–2.6)
MCHC RBC-ENTMCNC: 22.1 PG — LOW (ref 27–34)
MCHC RBC-ENTMCNC: 29.6 % — LOW (ref 32–36)
MCV RBC AUTO: 74.6 FL — LOW (ref 80–100)
MONOCYTES # BLD AUTO: 0.88 K/UL — SIGNIFICANT CHANGE UP (ref 0–0.9)
MONOCYTES NFR BLD AUTO: 11.4 % — SIGNIFICANT CHANGE UP (ref 2–14)
NEUTROPHILS # BLD AUTO: 4.85 K/UL — SIGNIFICANT CHANGE UP (ref 1.8–7.4)
NEUTROPHILS NFR BLD AUTO: 62.7 % — SIGNIFICANT CHANGE UP (ref 43–77)
NRBC # FLD: 0 K/UL — SIGNIFICANT CHANGE UP (ref 0–0)
PHOSPHATE SERPL-MCNC: 4 MG/DL — SIGNIFICANT CHANGE UP (ref 2.5–4.5)
PLATELET # BLD AUTO: 336 K/UL — SIGNIFICANT CHANGE UP (ref 150–400)
PMV BLD: 10.4 FL — SIGNIFICANT CHANGE UP (ref 7–13)
POTASSIUM SERPL-MCNC: 3.7 MMOL/L — SIGNIFICANT CHANGE UP (ref 3.5–5.3)
POTASSIUM SERPL-SCNC: 3.7 MMOL/L — SIGNIFICANT CHANGE UP (ref 3.5–5.3)
RBC # BLD: 4.89 M/UL — SIGNIFICANT CHANGE UP (ref 3.8–5.2)
RBC # FLD: 16.5 % — HIGH (ref 10.3–14.5)
SODIUM SERPL-SCNC: 139 MMOL/L — SIGNIFICANT CHANGE UP (ref 135–145)
T3 SERPL-MCNC: 77.6 NG/DL — LOW (ref 80–200)
T4 AB SER-ACNC: 8.17 UG/DL — SIGNIFICANT CHANGE UP (ref 5.1–13)
TSH SERPL-MCNC: 10.17 UIU/ML — HIGH (ref 0.27–4.2)
WBC # BLD: 7.74 K/UL — SIGNIFICANT CHANGE UP (ref 3.8–10.5)
WBC # FLD AUTO: 7.74 K/UL — SIGNIFICANT CHANGE UP (ref 3.8–10.5)

## 2020-06-07 RX ORDER — BENZOCAINE AND MENTHOL 5; 1 G/100ML; G/100ML
1 LIQUID ORAL ONCE
Refills: 0 | Status: DISCONTINUED | OUTPATIENT
Start: 2020-06-07 | End: 2020-06-11

## 2020-06-07 RX ADMIN — Medication 975 MILLIGRAM(S): at 05:51

## 2020-06-07 RX ADMIN — HYDROMORPHONE HYDROCHLORIDE 6 MILLIGRAM(S): 2 INJECTION INTRAMUSCULAR; INTRAVENOUS; SUBCUTANEOUS at 23:05

## 2020-06-07 RX ADMIN — Medication 975 MILLIGRAM(S): at 18:42

## 2020-06-07 RX ADMIN — Medication 975 MILLIGRAM(S): at 13:40

## 2020-06-07 RX ADMIN — HYDROMORPHONE HYDROCHLORIDE 6 MILLIGRAM(S): 2 INJECTION INTRAMUSCULAR; INTRAVENOUS; SUBCUTANEOUS at 04:04

## 2020-06-07 RX ADMIN — Medication 2 MILLIGRAM(S): at 07:37

## 2020-06-07 RX ADMIN — URSODIOL 300 MILLIGRAM(S): 250 TABLET, FILM COATED ORAL at 18:42

## 2020-06-07 RX ADMIN — GABAPENTIN 200 MILLIGRAM(S): 400 CAPSULE ORAL at 23:06

## 2020-06-07 RX ADMIN — TIZANIDINE 2 MILLIGRAM(S): 4 TABLET ORAL at 18:42

## 2020-06-07 RX ADMIN — Medication 25 MILLIGRAM(S): at 06:26

## 2020-06-07 RX ADMIN — LAMOTRIGINE 150 MILLIGRAM(S): 25 TABLET, ORALLY DISINTEGRATING ORAL at 13:40

## 2020-06-07 RX ADMIN — TIZANIDINE 2 MILLIGRAM(S): 4 TABLET ORAL at 05:51

## 2020-06-07 RX ADMIN — GABAPENTIN 200 MILLIGRAM(S): 400 CAPSULE ORAL at 05:51

## 2020-06-07 RX ADMIN — LAMOTRIGINE 25 MILLIGRAM(S): 25 TABLET, ORALLY DISINTEGRATING ORAL at 13:41

## 2020-06-07 RX ADMIN — SENNA PLUS 2 TABLET(S): 8.6 TABLET ORAL at 23:07

## 2020-06-07 RX ADMIN — URSODIOL 300 MILLIGRAM(S): 250 TABLET, FILM COATED ORAL at 05:51

## 2020-06-07 RX ADMIN — POLYETHYLENE GLYCOL 3350 17 GRAM(S): 17 POWDER, FOR SOLUTION ORAL at 13:40

## 2020-06-07 RX ADMIN — Medication 75 MICROGRAM(S): at 05:52

## 2020-06-07 RX ADMIN — ENOXAPARIN SODIUM 40 MILLIGRAM(S): 100 INJECTION SUBCUTANEOUS at 13:40

## 2020-06-07 RX ADMIN — GABAPENTIN 200 MILLIGRAM(S): 400 CAPSULE ORAL at 13:40

## 2020-06-07 RX ADMIN — Medication 3 MILLIGRAM(S): at 23:07

## 2020-06-07 RX ADMIN — Medication 5 MILLIGRAM(S): at 05:52

## 2020-06-07 RX ADMIN — Medication 200 MILLIGRAM(S): at 18:43

## 2020-06-07 RX ADMIN — Medication 200 MILLIGRAM(S): at 05:52

## 2020-06-07 NOTE — PROGRESS NOTE ADULT - SUBJECTIVE AND OBJECTIVE BOX
Patient is a 59y old  Female who presents with a chief complaint of left knee and hip pain (06 Jun 2020 06:51)      INTERVAL HPI/OVERNIGHT EVENTS: had MRI     MEDICATIONS  (STANDING):  acetaminophen   Tablet .. 975 milliGRAM(s) Oral every 6 hours  enoxaparin Injectable 40 milliGRAM(s) SubCutaneous daily  gabapentin 200 milliGRAM(s) Oral every 8 hours  hydroxychloroquine 200 milliGRAM(s) Oral two times a day  lamoTRIgine 150 milliGRAM(s) Oral daily  lamoTRIgine 25 milliGRAM(s) Oral daily  levothyroxine 75 MICROGram(s) Oral daily  lidocaine   Patch 2 Patch Transdermal daily  melatonin 3 milliGRAM(s) Oral at bedtime  polyethylene glycol 3350 17 Gram(s) Oral daily  senna 2 Tablet(s) Oral at bedtime  ursodiol Capsule 300 milliGRAM(s) Oral two times a day    MEDICATIONS  (PRN):  bisacodyl 5 milliGRAM(s) Oral every 12 hours PRN Constipation  diphenhydrAMINE 25 milliGRAM(s) Oral every 6 hours PRN Rash and/or Itching  HYDROmorphone   Tablet 4 milliGRAM(s) Oral every 4 hours PRN Moderate Pain (4 - 6)  HYDROmorphone   Tablet 6 milliGRAM(s) Oral every 4 hours PRN Severe Pain (7 - 10)  LORazepam     Tablet 2 milliGRAM(s) Oral two times a day PRN Anxiety  tiZANidine 2 milliGRAM(s) Oral every 6 hours PRN Muscle Spasm          Allergies    aspirin (Anaphylaxis)  ibuprofen (Unknown)  Levaquin (Anaphylaxis)  mirtazapine (Angioedema; Rash)  penicillin (Unknown)    Intolerances        REVIEW OF SYSTEMS:  CARDIOVASCULAR: No chest pain, palpitations, dizziness, or leg swelling; no shortness of breath     RESPIRATORY: No cough, wheezing, chills or hemoptysis; No shortness of breath    GASTROINTESTINAL: No abdominal or epigastric pain. No nausea, vomiting, or hematemesis; No diarrhea or constipation. No melena or hematochezia.    NEUROLOGICAL: No headaches, memory loss, loss of strength, numbness      Pain unchanged     PHYSICAL EXAM:  Vital Signs Last 24 Hrs  T(C): 36.4 (07 Jun 2020 05:49), Max: 36.8 (06 Jun 2020 09:38)  T(F): 97.5 (07 Jun 2020 05:49), Max: 98.3 (06 Jun 2020 09:38)  HR: 70 (07 Jun 2020 05:49) (70 - 92)  BP: 118/65 (07 Jun 2020 05:49) (118/65 - 124/82)  BP(mean): --  RR: 16 (07 Jun 2020 05:49) (16 - 18)  SpO2: 100% (07 Jun 2020 05:49) (97% - 100%)    GENERAL: NAD, well-groomed, well-developed  HEAD:  Atraumatic, Normocephalic  EYES: EOMI, PERRLA, conjunctiva and sclera clear  NECK: Supple, No JVD, Normal thyroid  NERVOUS SYSTEM:  Alert & Oriented X3, Good concentration;  and symmetric  CHEST/LUNG: Clear to auscultation bilaterally; No rales, rhonchi, wheezing, or rubs  HEART: S1S2 regular, without murmur, rub nor gallop  ABDOMEN: Soft, Nontender, Nondistended; Bowel sounds present  EXTREMITIES:  2+ Peripheral Pulses, No clubbing, cyanosis, or edema      LABS:      Ca    9.4        06 Jun 2020 06:15        CAPILLARY BLOOD GLUCOSE            Assessment and Plan:   Problem/Plan - 1:  ·  Problem: Acute pain of left knee.  Plan: pain management   MRI left knee and hip; MRI Spine   - results pending.   Pain management   Trial of gabipentin now 200 tid   Advance as needed     Problem/Plan - 2:  ·  Problem: Autoimmune skin disease.  rheum eval appreciated.  see consult.  Labs pending .     Problem/Plan - 3:  ·  Problem: Hypothyroidism, unspecified type.  Plan: continue dose, pt claims compliance.   tft's ordered.     Problem/Plan - 4:  ·  Problem: Sjogren's syndrome without extraglandular involvement.  Plan: Normal inflamatory markers.  Doubt cause of pain.     Problem/Plan - 5:  ·  Problem: Primary biliary cirrhosis.  Plan: Normal liver function  Mild pruritis.         Plan:  PT eval requested.     Care Discussed with Consultants/Other Providers: LAKEISHA

## 2020-06-07 NOTE — PHYSICAL THERAPY INITIAL EVALUATION ADULT - PERTINENT HX OF CURRENT PROBLEM, REHAB EVAL
Patient is a 59 year old female admitted with LLE knee/hip pain as well as left sided low back pain. PMH listed below, significant for Sjogren syndrome

## 2020-06-07 NOTE — OCCUPATIONAL THERAPY INITIAL EVALUATION ADULT - PLANNED THERAPY INTERVENTIONS, OT EVAL
balance training/motor coordination training/ROM/neuromuscular re-education/ADL retraining/bed mobility training/strengthening/transfer training

## 2020-06-07 NOTE — OCCUPATIONAL THERAPY INITIAL EVALUATION ADULT - PERTINENT HX OF CURRENT PROBLEM, REHAB EVAL
59y Female with PMHx of sjorgens, primary biliary cirrhosis, HLD presents with left knee and hip pain over several days, awoke this morning with escalation or pain.  . Pain progressively worsened over the last day. This morning, pt reports 10/10 achy pain that starts in the Left hip/back and radiates to the knee. Pain is described as constant pressure, with pain the worst in the hip. 58 y/o Female with PMHx of Sjorgens, primary biliary cirrhosis, HLD presents with left knee and hip pain over several days, awoke this morning with escalation or pain.  . Pain progressively worsened over the last day. This morning, pt reports 10/10 achy pain that starts in the Left hip/back and radiates to the knee. Pain is described as constant pressure, with pain the worst in the hip.

## 2020-06-07 NOTE — PHYSICAL THERAPY INITIAL EVALUATION ADULT - CRITERIA FOR SKILLED THERAPEUTIC INTERVENTIONS
risk reduction/prevention/therapy frequency/rehab potential/predicted duration of therapy intervention/impairments found/functional limitations in following categories/anticipated discharge recommendation

## 2020-06-07 NOTE — PROVIDER CONTACT NOTE (OTHER) - SITUATION
Patient complaining of itching and rash, new onset of coughing and patient stating, " I feel like my throat is closing up"

## 2020-06-07 NOTE — PROVIDER CONTACT NOTE (OTHER) - ASSESSMENT
Patient blood pressure 142/85 HR: 100 Bpm O2 Saturation: 100%. Patient having intermittent cough spells. PRN Benadryl 25 mg administered. Patient requested PRN ativan 2 mg for anxiety, administered.

## 2020-06-07 NOTE — PHYSICAL THERAPY INITIAL EVALUATION ADULT - MANUAL MUSCLE TESTING RESULTS, REHAB EVAL
grossly assessed due to/pain; at least 3/5 throughout bilateral LE. Patient able to ankle pump, heel slide, straight leg raise

## 2020-06-08 LAB
ANION GAP SERPL CALC-SCNC: 12 MMO/L — SIGNIFICANT CHANGE UP (ref 7–14)
ANION GAP SERPL CALC-SCNC: 12 MMO/L — SIGNIFICANT CHANGE UP (ref 7–14)
APTT BLD: 32.7 SEC — SIGNIFICANT CHANGE UP (ref 27.5–36.3)
BASOPHILS # BLD AUTO: 0.05 K/UL — SIGNIFICANT CHANGE UP (ref 0–0.2)
BASOPHILS NFR BLD AUTO: 0.6 % — SIGNIFICANT CHANGE UP (ref 0–2)
BUN SERPL-MCNC: 22 MG/DL — SIGNIFICANT CHANGE UP (ref 7–23)
BUN SERPL-MCNC: 22 MG/DL — SIGNIFICANT CHANGE UP (ref 7–23)
CALCIUM SERPL-MCNC: 9.4 MG/DL — SIGNIFICANT CHANGE UP (ref 8.4–10.5)
CALCIUM SERPL-MCNC: 9.4 MG/DL — SIGNIFICANT CHANGE UP (ref 8.4–10.5)
CHLORIDE SERPL-SCNC: 103 MMOL/L — SIGNIFICANT CHANGE UP (ref 98–107)
CHLORIDE SERPL-SCNC: 103 MMOL/L — SIGNIFICANT CHANGE UP (ref 98–107)
CO2 SERPL-SCNC: 25 MMOL/L — SIGNIFICANT CHANGE UP (ref 22–31)
CO2 SERPL-SCNC: 25 MMOL/L — SIGNIFICANT CHANGE UP (ref 22–31)
CREAT SERPL-MCNC: 0.7 MG/DL — SIGNIFICANT CHANGE UP (ref 0.5–1.3)
CREAT SERPL-MCNC: 0.7 MG/DL — SIGNIFICANT CHANGE UP (ref 0.5–1.3)
DRVVT SCREEN TO CONFIRM RATIO: 0.9 — SIGNIFICANT CHANGE UP (ref 0–1.2)
EOSINOPHIL # BLD AUTO: 0.25 K/UL — SIGNIFICANT CHANGE UP (ref 0–0.5)
EOSINOPHIL NFR BLD AUTO: 2.9 % — SIGNIFICANT CHANGE UP (ref 0–6)
GLUCOSE SERPL-MCNC: 114 MG/DL — HIGH (ref 70–99)
GLUCOSE SERPL-MCNC: 114 MG/DL — HIGH (ref 70–99)
HCT VFR BLD CALC: 35.7 % — SIGNIFICANT CHANGE UP (ref 34.5–45)
HCT VFR BLD CALC: 35.7 % — SIGNIFICANT CHANGE UP (ref 34.5–45)
HGB BLD-MCNC: 10.3 G/DL — LOW (ref 11.5–15.5)
HGB BLD-MCNC: 10.3 G/DL — LOW (ref 11.5–15.5)
IMM GRANULOCYTES NFR BLD AUTO: 0.2 % — SIGNIFICANT CHANGE UP (ref 0–1.5)
INR BLD: 0.97 — SIGNIFICANT CHANGE UP (ref 0.88–1.17)
LYMPHOCYTES # BLD AUTO: 1.23 K/UL — SIGNIFICANT CHANGE UP (ref 1–3.3)
LYMPHOCYTES # BLD AUTO: 14.5 % — SIGNIFICANT CHANGE UP (ref 13–44)
MAGNESIUM SERPL-MCNC: 2.1 MG/DL — SIGNIFICANT CHANGE UP (ref 1.6–2.6)
MCHC RBC-ENTMCNC: 22 PG — LOW (ref 27–34)
MCHC RBC-ENTMCNC: 22 PG — LOW (ref 27–34)
MCHC RBC-ENTMCNC: 28.9 % — LOW (ref 32–36)
MCHC RBC-ENTMCNC: 28.9 % — LOW (ref 32–36)
MCV RBC AUTO: 76.1 FL — LOW (ref 80–100)
MCV RBC AUTO: 76.1 FL — LOW (ref 80–100)
MONOCYTES # BLD AUTO: 0.79 K/UL — SIGNIFICANT CHANGE UP (ref 0–0.9)
MONOCYTES NFR BLD AUTO: 9.3 % — SIGNIFICANT CHANGE UP (ref 2–14)
NEUTROPHILS # BLD AUTO: 6.16 K/UL — SIGNIFICANT CHANGE UP (ref 1.8–7.4)
NEUTROPHILS NFR BLD AUTO: 72.5 % — SIGNIFICANT CHANGE UP (ref 43–77)
NORMALIZED SCT PPP-RTO: 1.2 — SIGNIFICANT CHANGE UP (ref 0.85–1.33)
NRBC # FLD: 0 K/UL — SIGNIFICANT CHANGE UP (ref 0–0)
NRBC # FLD: 0 K/UL — SIGNIFICANT CHANGE UP (ref 0–0)
PHOSPHATE SERPL-MCNC: 4 MG/DL — SIGNIFICANT CHANGE UP (ref 2.5–4.5)
PLATELET # BLD AUTO: 368 K/UL — SIGNIFICANT CHANGE UP (ref 150–400)
PLATELET # BLD AUTO: 368 K/UL — SIGNIFICANT CHANGE UP (ref 150–400)
PMV BLD: 10.6 FL — SIGNIFICANT CHANGE UP (ref 7–13)
PMV BLD: 10.6 FL — SIGNIFICANT CHANGE UP (ref 7–13)
POTASSIUM SERPL-MCNC: 3.7 MMOL/L — SIGNIFICANT CHANGE UP (ref 3.5–5.3)
POTASSIUM SERPL-MCNC: 3.7 MMOL/L — SIGNIFICANT CHANGE UP (ref 3.5–5.3)
POTASSIUM SERPL-SCNC: 3.7 MMOL/L — SIGNIFICANT CHANGE UP (ref 3.5–5.3)
POTASSIUM SERPL-SCNC: 3.7 MMOL/L — SIGNIFICANT CHANGE UP (ref 3.5–5.3)
PROTHROM AB SERPL-ACNC: 11.1 SEC — SIGNIFICANT CHANGE UP (ref 9.8–13.1)
RBC # BLD: 4.69 M/UL — SIGNIFICANT CHANGE UP (ref 3.8–5.2)
RBC # BLD: 4.69 M/UL — SIGNIFICANT CHANGE UP (ref 3.8–5.2)
RBC # FLD: 16.5 % — HIGH (ref 10.3–14.5)
RBC # FLD: 16.5 % — HIGH (ref 10.3–14.5)
RHEUMATOID FACT SERPL-ACNC: SIGNIFICANT CHANGE UP IU/ML (ref 0–13)
SODIUM SERPL-SCNC: 140 MMOL/L — SIGNIFICANT CHANGE UP (ref 135–145)
SODIUM SERPL-SCNC: 140 MMOL/L — SIGNIFICANT CHANGE UP (ref 135–145)
THROMBIN TIME: 24.7 SEC — SIGNIFICANT CHANGE UP (ref 16–26)
WBC # BLD: 8.5 K/UL — SIGNIFICANT CHANGE UP (ref 3.8–10.5)
WBC # BLD: 8.5 K/UL — SIGNIFICANT CHANGE UP (ref 3.8–10.5)
WBC # FLD AUTO: 8.5 K/UL — SIGNIFICANT CHANGE UP (ref 3.8–10.5)
WBC # FLD AUTO: 8.5 K/UL — SIGNIFICANT CHANGE UP (ref 3.8–10.5)

## 2020-06-08 PROCEDURE — 99232 SBSQ HOSP IP/OBS MODERATE 35: CPT | Mod: GC

## 2020-06-08 RX ORDER — DEXAMETHASONE 0.5 MG/5ML
10 ELIXIR ORAL EVERY 6 HOURS
Refills: 0 | Status: DISCONTINUED | OUTPATIENT
Start: 2020-06-08 | End: 2020-06-08

## 2020-06-08 RX ORDER — ONDANSETRON 8 MG/1
4 TABLET, FILM COATED ORAL ONCE
Refills: 0 | Status: COMPLETED | OUTPATIENT
Start: 2020-06-08 | End: 2020-06-09

## 2020-06-08 RX ORDER — DEXAMETHASONE 0.5 MG/5ML
10 ELIXIR ORAL EVERY 6 HOURS
Refills: 0 | Status: COMPLETED | OUTPATIENT
Start: 2020-06-08 | End: 2020-06-09

## 2020-06-08 RX ADMIN — LAMOTRIGINE 25 MILLIGRAM(S): 25 TABLET, ORALLY DISINTEGRATING ORAL at 14:07

## 2020-06-08 RX ADMIN — HYDROMORPHONE HYDROCHLORIDE 6 MILLIGRAM(S): 2 INJECTION INTRAMUSCULAR; INTRAVENOUS; SUBCUTANEOUS at 20:38

## 2020-06-08 RX ADMIN — GABAPENTIN 200 MILLIGRAM(S): 400 CAPSULE ORAL at 14:06

## 2020-06-08 RX ADMIN — ENOXAPARIN SODIUM 40 MILLIGRAM(S): 100 INJECTION SUBCUTANEOUS at 14:07

## 2020-06-08 RX ADMIN — HYDROMORPHONE HYDROCHLORIDE 6 MILLIGRAM(S): 2 INJECTION INTRAMUSCULAR; INTRAVENOUS; SUBCUTANEOUS at 03:23

## 2020-06-08 RX ADMIN — Medication 200 MILLIGRAM(S): at 17:42

## 2020-06-08 RX ADMIN — TIZANIDINE 2 MILLIGRAM(S): 4 TABLET ORAL at 17:41

## 2020-06-08 RX ADMIN — TIZANIDINE 2 MILLIGRAM(S): 4 TABLET ORAL at 22:52

## 2020-06-08 RX ADMIN — Medication 975 MILLIGRAM(S): at 06:11

## 2020-06-08 RX ADMIN — TIZANIDINE 2 MILLIGRAM(S): 4 TABLET ORAL at 09:57

## 2020-06-08 RX ADMIN — Medication 975 MILLIGRAM(S): at 00:02

## 2020-06-08 RX ADMIN — TIZANIDINE 2 MILLIGRAM(S): 4 TABLET ORAL at 03:25

## 2020-06-08 RX ADMIN — HYDROMORPHONE HYDROCHLORIDE 4 MILLIGRAM(S): 2 INJECTION INTRAMUSCULAR; INTRAVENOUS; SUBCUTANEOUS at 14:53

## 2020-06-08 RX ADMIN — HYDROMORPHONE HYDROCHLORIDE 6 MILLIGRAM(S): 2 INJECTION INTRAMUSCULAR; INTRAVENOUS; SUBCUTANEOUS at 09:04

## 2020-06-08 RX ADMIN — Medication 200 MILLIGRAM(S): at 06:12

## 2020-06-08 RX ADMIN — Medication 75 MICROGRAM(S): at 06:12

## 2020-06-08 RX ADMIN — URSODIOL 300 MILLIGRAM(S): 250 TABLET, FILM COATED ORAL at 06:12

## 2020-06-08 RX ADMIN — GABAPENTIN 200 MILLIGRAM(S): 400 CAPSULE ORAL at 06:13

## 2020-06-08 RX ADMIN — URSODIOL 300 MILLIGRAM(S): 250 TABLET, FILM COATED ORAL at 17:42

## 2020-06-08 RX ADMIN — LAMOTRIGINE 150 MILLIGRAM(S): 25 TABLET, ORALLY DISINTEGRATING ORAL at 14:06

## 2020-06-08 RX ADMIN — Medication 102 MILLIGRAM(S): at 21:40

## 2020-06-08 RX ADMIN — GABAPENTIN 200 MILLIGRAM(S): 400 CAPSULE ORAL at 21:58

## 2020-06-08 RX ADMIN — Medication 3 MILLIGRAM(S): at 21:58

## 2020-06-08 NOTE — PROGRESS NOTE ADULT - SUBJECTIVE AND OBJECTIVE BOX
MARINO ELDRIDGE  [id number]    INTERVAL HPI/OVERNIGHT EVENTS:    Pt was seen and examined at the bedside. denies any new complaint.   MEDICATIONS  (STANDING):  benzocaine 15 mG/menthol 3.6 mG (Sugar-Free) Lozenge 1 Lozenge Oral once  enoxaparin Injectable 40 milliGRAM(s) SubCutaneous daily  gabapentin 200 milliGRAM(s) Oral every 8 hours  hydroxychloroquine 200 milliGRAM(s) Oral two times a day  lamoTRIgine 150 milliGRAM(s) Oral daily  lamoTRIgine 25 milliGRAM(s) Oral daily  levothyroxine 75 MICROGram(s) Oral daily  lidocaine   Patch 2 Patch Transdermal daily  melatonin 3 milliGRAM(s) Oral at bedtime  polyethylene glycol 3350 17 Gram(s) Oral daily  senna 2 Tablet(s) Oral at bedtime  ursodiol Capsule 300 milliGRAM(s) Oral two times a day    MEDICATIONS  (PRN):  bisacodyl 5 milliGRAM(s) Oral every 12 hours PRN Constipation  diphenhydrAMINE 25 milliGRAM(s) Oral every 6 hours PRN Rash and/or Itching  HYDROmorphone   Tablet 4 milliGRAM(s) Oral every 4 hours PRN Moderate Pain (4 - 6)  HYDROmorphone   Tablet 6 milliGRAM(s) Oral every 4 hours PRN Severe Pain (7 - 10)  LORazepam     Tablet 2 milliGRAM(s) Oral two times a day PRN Anxiety  tiZANidine 2 milliGRAM(s) Oral every 6 hours PRN Muscle Spasm      Allergies    aspirin (Anaphylaxis)  ibuprofen (Unknown)  Levaquin (Anaphylaxis)  mirtazapine (Angioedema; Rash)  penicillin (Unknown)    Intolerances        Review of Systems:   General: No fevers/chills, no fatigue  HEENT: No blurry vision, dysphagia, or odynophagia  CVS: No CP/palpitations  Resp: No SOB/wheezing  GI: No N/V/C/D/abdominal pain  MSK:   Skin: No new rashes  Neuro: No headaches      Vital Signs Last 24 Hrs  T(C): 36.7 (08 Jun 2020 06:10), Max: 37 (07 Jun 2020 18:35)  T(F): 98.1 (08 Jun 2020 06:10), Max: 98.6 (07 Jun 2020 18:35)  HR: 71 (08 Jun 2020 06:10) (71 - 93)  BP: 112/64 (08 Jun 2020 06:10) (101/60 - 125/75)  BP(mean): --  RR: 16 (08 Jun 2020 06:10) (16 - 19)  SpO2: 98% (08 Jun 2020 06:10) (98% - 100%)    Physical Exam:  General: NAD  HEENT: EOMI, MMM  Cardio: +S1/S2, RRR  Resp: CTA b/l  GI: +BS, soft, NT/ND  MSK:  Neuro: AAOx3  muscle strength RUE LUE; RLE LLE     LABS:                        10.3   8.50  )-----------( 368      ( 08 Jun 2020 06:07 )             35.7     06-08    140  |  103  |  22  ----------------------------<  114<H>  3.7   |  25  |  0.70    Ca    9.4      08 Jun 2020 06:07  Phos  4.0     06-08  Mg     2.1     06-08        RADIOLOGY & ADDITIONAL TESTS:      < from: MR Knee No Cont, Left (06.06.20 @ 21:58) >  Findings:    LIGAMENTS: The cruciate and collateral ligaments are intact.  MEDIAL COMPARTMENT: Medial meniscus is intact. The articular cartilage is preserved.   LATERAL COMPARTMENT: Lateral meniscus is intact. There is a superficial chondral fissure at the posterior lateral tibial plateau.  PATELLOFEMORAL COMPARTMENT: There is focal full-thickness cartilage loss at the lateral aspect of the median ridge with mild to moderate subchondral signal abnormality. There is focal full-thickness cartilage loss at the mid medial trochlea with mild subchondral signal abnormality.  EXTENSOR MECHANISM: Quadriceps and patellar tendons are intact.  SYNOVIUM: There is no knee joint effusion. There is no popliteal cyst.   BONES: There is no acute fracture or osteonecrosis.  SOFT TISSUES: Normal.    IMPRESSION:   Focal cartilage loss at the median ridge and medial trochlea with subchondral signal abnormality. No ligament or meniscal tear.            < end of copied text >      < from: MR Hip No Cont, Left (06.06.20 @ 21:40) >    Findings:     Several sequences are mild to moderately degraded by motion artifact. There is no marrow edema or linear signal abnormality to suggest an acute fracture. There is no hip joint effusion.There is no signal abnormality at the left femoral head to suggest avascular necrosis. There is no detached labral tear.    There is minimal left gluteus minimus insertional tendinosis. There is minimal edema adjacent to the left gluteus minimus insertion and mild soft tissue edema laterally adjacent to this region and the left greater trochanter. The left iliopsoas insertion is intact. The muscle signal is normal.    A catheter is noted within the urinary bladder. Pessary is noted. There is a right nabothian cyst.    Impression:   Minimal left gluteus minimus insertional tendinosis with mild edema laterally adjacent to the left greater trochanter.      < end of copied text >      < from: MR Lumbar Spine No Cont (06.06.20 @ 21:39) >  INTERPRETATION:  INDICATIONS:  Myelopathy, pain and urinary retention Sjogren's syndrome      TECHNIQUE:  Sagittal T1 weighted and T2 weighted images of the lumbosacral spine as well as axial T1 weighted and T2 weighted images were obtained.      COMPARISON EXAMINATION: None.      FINDINGS:    VERTEBRAL BODIES AND DISCS:  Normal.  ALIGNMENT:  No subluxations.  L1-L2 LEVEL:  Normal.  L2-L3 LEVEL: Lateral disc protrusion encroaches upon the exiting left L2 root without alicia compression  L3-L4 LEVEL: Subarticular and left Lateral disc extrusion significantly compresses the exiting left L3 root at the level of the neural foramen. Lateral disc material on the right with a focal protrusion encroaches upon the exiting right L3 root without alicia compression.  L4-L5 LEVEL:  Normal.  L5-S1 LEVEL:  Normal.  SPINAL CANAL:  No other intradural or extradural defects are seen.   CONUS MEDULLARIS: The conus ends at L1  MISCELLANEOUS:None.    IMPRESSION:  Subarticular and left lateral disc extrusion at L3-4 compresses and displaces the exiting left L3 root at the level of the neural foramen. Lateral disc material on the right at the same level encroaches upon the exiting right L3root without alicia compression.. Lateral disc protrusion L2-3 encroaches upon the exiting left L2 root without alicia compression    < end of copied text > MARINO ELDRIDGE  [id number]    INTERVAL HPI/OVERNIGHT EVENTS:    Pt was seen and examined at the bedside. denies any new complaint. reports pain is better controlled now.     MEDICATIONS  (STANDING):  benzocaine 15 mG/menthol 3.6 mG (Sugar-Free) Lozenge 1 Lozenge Oral once  enoxaparin Injectable 40 milliGRAM(s) SubCutaneous daily  gabapentin 200 milliGRAM(s) Oral every 8 hours  hydroxychloroquine 200 milliGRAM(s) Oral two times a day  lamoTRIgine 150 milliGRAM(s) Oral daily  lamoTRIgine 25 milliGRAM(s) Oral daily  levothyroxine 75 MICROGram(s) Oral daily  lidocaine   Patch 2 Patch Transdermal daily  melatonin 3 milliGRAM(s) Oral at bedtime  polyethylene glycol 3350 17 Gram(s) Oral daily  senna 2 Tablet(s) Oral at bedtime  ursodiol Capsule 300 milliGRAM(s) Oral two times a day    MEDICATIONS  (PRN):  bisacodyl 5 milliGRAM(s) Oral every 12 hours PRN Constipation  diphenhydrAMINE 25 milliGRAM(s) Oral every 6 hours PRN Rash and/or Itching  HYDROmorphone   Tablet 4 milliGRAM(s) Oral every 4 hours PRN Moderate Pain (4 - 6)  HYDROmorphone   Tablet 6 milliGRAM(s) Oral every 4 hours PRN Severe Pain (7 - 10)  LORazepam     Tablet 2 milliGRAM(s) Oral two times a day PRN Anxiety  tiZANidine 2 milliGRAM(s) Oral every 6 hours PRN Muscle Spasm      Allergies    aspirin (Anaphylaxis)  ibuprofen (Unknown)  Levaquin (Anaphylaxis)  mirtazapine (Angioedema; Rash)  penicillin (Unknown)    Intolerances      Vital Signs Last 24 Hrs  T(C): 36.7 (08 Jun 2020 06:10), Max: 37 (07 Jun 2020 18:35)  T(F): 98.1 (08 Jun 2020 06:10), Max: 98.6 (07 Jun 2020 18:35)  HR: 71 (08 Jun 2020 06:10) (71 - 93)  BP: 112/64 (08 Jun 2020 06:10) (101/60 - 125/75)  BP(mean): --  RR: 16 (08 Jun 2020 06:10) (16 - 19)  SpO2: 98% (08 Jun 2020 06:10) (98% - 100%)    Physical Exam:  General: NAD  HEENT: EOMI, MMM  Cardio: +S1/S2, RRR  Resp: CTA b/l  GI: +BS, soft, NT/ND  MSK: pt declined re-exam  Neuro: AAOx3  Skin: no visible rashes.    LABS:                        10.3   8.50  )-----------( 368      ( 08 Jun 2020 06:07 )             35.7     06-08    140  |  103  |  22  ----------------------------<  114<H>  3.7   |  25  |  0.70    Ca    9.4      08 Jun 2020 06:07  Phos  4.0     06-08  Mg     2.1     06-08    Rheumatoid Factor Quant, Serum or Plasma: --: Test Repeated IU/mL (06.08.20 @ 09:40)        RADIOLOGY & ADDITIONAL TESTS:      < from: MR Knee No Cont, Left (06.06.20 @ 21:58) >  Findings:    LIGAMENTS: The cruciate and collateral ligaments are intact.  MEDIAL COMPARTMENT: Medial meniscus is intact. The articular cartilage is preserved.   LATERAL COMPARTMENT: Lateral meniscus is intact. There is a superficial chondral fissure at the posterior lateral tibial plateau.  PATELLOFEMORAL COMPARTMENT: There is focal full-thickness cartilage loss at the lateral aspect of the median ridge with mild to moderate subchondral signal abnormality. There is focal full-thickness cartilage loss at the mid medial trochlea with mild subchondral signal abnormality.  EXTENSOR MECHANISM: Quadriceps and patellar tendons are intact.  SYNOVIUM: There is no knee joint effusion. There is no popliteal cyst.   BONES: There is no acute fracture or osteonecrosis.  SOFT TISSUES: Normal.    IMPRESSION:   Focal cartilage loss at the median ridge and medial trochlea with subchondral signal abnormality. No ligament or meniscal tear.            < end of copied text >      < from: MR Hip No Cont, Left (06.06.20 @ 21:40) >    Findings:     Several sequences are mild to moderately degraded by motion artifact. There is no marrow edema or linear signal abnormality to suggest an acute fracture. There is no hip joint effusion.There is no signal abnormality at the left femoral head to suggest avascular necrosis. There is no detached labral tear.    There is minimal left gluteus minimus insertional tendinosis. There is minimal edema adjacent to the left gluteus minimus insertion and mild soft tissue edema laterally adjacent to this region and the left greater trochanter. The left iliopsoas insertion is intact. The muscle signal is normal.    A catheter is noted within the urinary bladder. Pessary is noted. There is a right nabothian cyst.    Impression:   Minimal left gluteus minimus insertional tendinosis with mild edema laterally adjacent to the left greater trochanter.      < end of copied text >      < from: MR Lumbar Spine No Cont (06.06.20 @ 21:39) >  INTERPRETATION:  INDICATIONS:  Myelopathy, pain and urinary retention Sjogren's syndrome      TECHNIQUE:  Sagittal T1 weighted and T2 weighted images of the lumbosacral spine as well as axial T1 weighted and T2 weighted images were obtained.      COMPARISON EXAMINATION: None.      FINDINGS:    VERTEBRAL BODIES AND DISCS:  Normal.  ALIGNMENT:  No subluxations.  L1-L2 LEVEL:  Normal.  L2-L3 LEVEL: Lateral disc protrusion encroaches upon the exiting left L2 root without alicia compression  L3-L4 LEVEL: Subarticular and left Lateral disc extrusion significantly compresses the exiting left L3 root at the level of the neural foramen. Lateral disc material on the right with a focal protrusion encroaches upon the exiting right L3 root without alicia compression.  L4-L5 LEVEL:  Normal.  L5-S1 LEVEL:  Normal.  SPINAL CANAL:  No other intradural or extradural defects are seen.   CONUS MEDULLARIS: The conus ends at L1  MISCELLANEOUS:None.    IMPRESSION:  Subarticular and left lateral disc extrusion at L3-4 compresses and displaces the exiting left L3 root at the level of the neural foramen. Lateral disc material on the right at the same level encroaches upon the exiting right L3root without alicia compression.. Lateral disc protrusion L2-3 encroaches upon the exiting left L2 root without alicia compression    < end of copied text >

## 2020-06-08 NOTE — PROVIDER CONTACT NOTE (OTHER) - ACTION/TREATMENT ORDERED:
Will consult pain management and will order tylenol.
Continue to monitor patient. Will order cepacol lozenges.
Will place order for Todd
Brooks removed per provider for trial of void for 8AM. Reassess at 1600 for urine output. Notify provider for changes.
Do not give valium. Will assess patient in person. Pt currently has had enough iv dilaudid at this point. Will follow up with night nurse
Ok to hold miralax
Provider made aware and as per provider PRN dilaudid to be given.
Provider made aware. Bladder scan ordered.
Provider made aware. Dilaudid dose ordered. Dilaudid dose will be given now and PRN dose due in upcoming hour to be held. Will continue to monitor patient pain.
Provider made aware. IV Tylenol ordered. Will continue to monitor patient pain.
Will place order for iv tylenol. Input weight into system and do EKG. Will check QTc before ordering zofran.
Will place order for robbin
will call urology for consult. pain medication to be administered as per orders

## 2020-06-08 NOTE — PROGRESS NOTE ADULT - SUBJECTIVE AND OBJECTIVE BOX
Patient is a 59y old  Female who presents with a chief complaint of left knee and hip pain (08 Jun 2020 08:36)      INTERVAL HPI/OVERNIGHT EVENTS:    MEDICATIONS  (STANDING):  benzocaine 15 mG/menthol 3.6 mG (Sugar-Free) Lozenge 1 Lozenge Oral once  enoxaparin Injectable 40 milliGRAM(s) SubCutaneous daily  gabapentin 200 milliGRAM(s) Oral every 8 hours  hydroxychloroquine 200 milliGRAM(s) Oral two times a day  lamoTRIgine 150 milliGRAM(s) Oral daily  lamoTRIgine 25 milliGRAM(s) Oral daily  levothyroxine 75 MICROGram(s) Oral daily  lidocaine   Patch 2 Patch Transdermal daily  melatonin 3 milliGRAM(s) Oral at bedtime  polyethylene glycol 3350 17 Gram(s) Oral daily  senna 2 Tablet(s) Oral at bedtime  ursodiol Capsule 300 milliGRAM(s) Oral two times a day    MEDICATIONS  (PRN):  bisacodyl 5 milliGRAM(s) Oral every 12 hours PRN Constipation  diphenhydrAMINE 25 milliGRAM(s) Oral every 6 hours PRN Rash and/or Itching  HYDROmorphone   Tablet 4 milliGRAM(s) Oral every 4 hours PRN Moderate Pain (4 - 6)  HYDROmorphone   Tablet 6 milliGRAM(s) Oral every 4 hours PRN Severe Pain (7 - 10)  LORazepam     Tablet 2 milliGRAM(s) Oral two times a day PRN Anxiety  tiZANidine 2 milliGRAM(s) Oral every 6 hours PRN Muscle Spasm        Orders last 24 hours:  Anti-Nuclear Antibody: STAT (06-08-20 @ 08:46)  Double Stranded DNA Antibody: STAT (06-08-20 @ 08:46)  Rheumatoid Factor Quant, Serum or Plasma: STAT (06-08-20 @ 08:46)  Cyclic Citrullinated Peptide AB: STAT (06-08-20 @ 08:46)  Lupus Profile: STAT (06-08-20 @ 08:46)  Anticardiolipin Antibody Level, Total:  Start Date:08-Jun-2020. STAT (06-08-20 @ 08:46)  Beta 2 Glycoprotein 1 Antibody Screen: STAT (06-08-20 @ 08:46)  Sjogren's Syndrome Antibodies: STAT (06-08-20 @ 08:46)  ROEL Antibody Screening Test: STAT (06-08-20 @ 08:46)      Allergies    aspirin (Anaphylaxis)  ibuprofen (Unknown)  Levaquin (Anaphylaxis)  mirtazapine (Angioedema; Rash)  penicillin (Unknown)    Intolerances        REVIEW OF SYSTEMS:  CARDIOVASCULAR: No chest pain, palpitations, dizziness, or leg swelling; no shortness of breath     RESPIRATORY: No cough, wheezing, chills or hemoptysis; No shortness of breath    GASTROINTESTINAL: No abdominal or epigastric pain. No nausea, vomiting, or hematemesis; No diarrhea or constipation. No melena or hematochezia.    NEUROLOGICAL: No headaches, memory loss, loss of strength, numbness      PHYSICAL EXAM:  Vital Signs Last 24 Hrs  T(C): 36.7 (08 Jun 2020 06:10), Max: 37 (07 Jun 2020 18:35)  T(F): 98.1 (08 Jun 2020 06:10), Max: 98.6 (07 Jun 2020 18:35)  HR: 70 (08 Jun 2020 09:02) (70 - 93)  BP: 123/66 (08 Jun 2020 09:02) (101/60 - 125/75)  BP(mean): --  RR: 17 (08 Jun 2020 09:02) (16 - 19)  SpO2: 97% (08 Jun 2020 09:02) (97% - 100%)    GENERAL: NAD, well-groomed, well-developed  HEAD:  Atraumatic, Normocephalic  EYES: EOMI, PERRLA, conjunctiva and sclera clear  NECK: Supple, No JVD, Normal thyroid  NERVOUS SYSTEM:  Alert & Oriented X3, Good concentration;  and symmetric  CHEST/LUNG: Clear to auscultation bilaterally; No rales, rhonchi, wheezing, or rubs  HEART: S1S2 regular, without murmur, rub nor gallop  ABDOMEN: Soft, Nontender, Nondistended; Bowel sounds present  EXTREMITIES:  2+ Peripheral Pulses, No clubbing, cyanosis, or edema      LABS:                        10.3   8.50  )-----------( 368      ( 08 Jun 2020 06:07 )             35.7     08 Jun 2020 06:07    140    |  103    |  22     ----------------------------<  114    3.7     |  25     |  0.70     Ca    9.4        08 Jun 2020 06:07  Phos  4.0       08 Jun 2020 06:07  Mg     2.1       08 Jun 2020 06:07        CAPILLARY BLOOD GLUCOSE      < from: MR Lumbar Spine No Cont (06.06.20 @ 21:39) >  IMPRESSION:  Subarticular and left lateral disc extrusion at L3-4 compresses and displaces the exiting left L3 root at the level of the neural foramen. Lateral disc material on the right at the same level encroaches upon the exiting right L3root without alicia compression.. Lateral disc protrusion L2-3 encroaches upon the exiting left L2 root without alicia compression    < end of copied text >      TELEMETRY:    RADIOLOGY & ADDITIONAL TESTS:    Imaging Personally Reviewed:  [ ] YES     Consultant(s) Notes Reviewed:   rhueum       Assessment and Plan:   Problem/Plan - 1:  ·  Problem: Acute pain of left knee.  Plan: pain management   MRI left knee and hip noted; MRI Spine   -ortho is covering spine   To consult  results pending (called) .   Pain management   Trial of gabipentin now 200 tid   Advance as needed, [id number] may be contributing to urinary retention      Problem/Plan - 2:  ·  Problem: Autoimmune skin disease.  rheum eval appreciated.  see consult.  Labs pending .     Problem/Plan - 3:  ·  Problem: Hypothyroidism, unspecified type.  Plan: continue dose, pt claims compliance.   tft's ordered.     Problem/Plan - 4:  ·  Problem: Sjogren's syndrome without extraglandular involvement.  Plan: Normal inflamatory markers.  Doubt cause of pain.     Problem/Plan - 5:  ·  Problem: Primary biliary cirrhosis.  Plan: Normal liver function  Mild pruritis.       seen by PT. ? Rehab placement  Plan:  PT eval requested.     Care Discussed with Consultants/Other Providers: LAKEISHA

## 2020-06-08 NOTE — PROVIDER CONTACT NOTE (OTHER) - RECOMMENDATIONS
Brooks removed per provider for trial of void for 8AM. Reassess at 1600 for urine output. Notify provider for changes.

## 2020-06-08 NOTE — PROGRESS NOTE ADULT - ASSESSMENT
60 y/o F with PMH of Sjogren's, Raynaud's, Chilblain's, Primary Biliary sclerosis, hypothyroidism, Anemia, Asthma, HLD presents with progressively worsening left knee and left hip pain over last 2 weeks.   Xray hip and Knee unremarkable.   No fever, leukocytosis or elevated inflammatory markers.     Impression:   # Left hip pain left knee:  neuropathic vs articular pain.   - Level of pain and location atypical for Sjogrens.   not on steroids currently reducing risk of AVN  - on limited exam at the bedside due to pain there is no clear evidence for acute inflammatory knee arthritis.- will obtain MRI hip and knee   - In addition normal ESR and lack of leukocytosis are reassuring for infectious vs inflammatory process.   - pain management follows- agree with Gabapentin for neuropathic pain- should monitor for worsening urinary retention as side effect    # Urinary retention  - could be due to pain medication   - MRI with L3-L4 disc extrusion - would consult with Neurosurgery team     #Sjogrens.  without PBC but no other features of systemic organ involvement in the past.  - will obtain collateral from primary rheumatologist- left message     Recommendation:   - Pain management input appreciated   - Please obtain neurosurgery consult for abnormal MRI consistent with left leg radicular pain     Rheum will follow  Case was seen and discussed with Dr. Madison Ansari MD  Rheum fellow PGY 4   Pager [phone]/ [phone] 58 y/o F with PMH of Sjogren's, Raynaud's, Chilblain's, Primary Biliary sclerosis, hypothyroidism, Anemia, Asthma, HLD presents with progressively worsening left knee and left hip pain over last 2 weeks.   Xray hip and Knee unremarkable.   No fever, leukocytosis or elevated inflammatory markers.     Impression:   # Left hip pain left knee:  neuropathic vs articular pain.   - Level of pain and location atypical for Sjogrens.   not on steroids currently reducing risk of AVN  - on limited exam at the bedside due to pain there is no clear evidence for acute inflammatory knee arthritis.- will obtain MRI hip and knee   - In addition normal ESR and lack of leukocytosis are reassuring for infectious vs inflammatory process.   - pain management follows- agree with Gabapentin for neuropathic pain- should monitor for worsening urinary retention as side effect    # Urinary retention  - could be due to pain medication   - MRI with L3-L4 disc extrusion - would consult with Neurosurgery team     #Sjogrens.  without PBC but no other features of systemic organ involvement in the past.  - will obtain collateral from primary rheumatologist- left message     Recommendation:   - Pain management input appreciated   - Please obtain Ortho consult for abnormal MRI findings consistent with disc herniation explaining left leg radicular pain   - Imaging was reviewed by radiology , there is no evidence for osteonecrosis in MRI knee     Rheum will follow  Case was seen and discussed with Dr. Madison Ansari MD  Rheum fellow PGY 4   Pager [phone]/ [phone] 58 y/o F with PMH of Sjogren's, Raynaud's, Chilblain's, Primary Biliary sclerosis, hypothyroidism, Anemia, Asthma, HLD presents with progressively worsening left knee and left hip pain over last 2 weeks.   Xray hip and Knee unremarkable.   No fever, leukocytosis or elevated inflammatory markers.     Impression:   # L3 radiculopathy, Left hip pain left knee   - Reviewed MRI knee with [facility] radiology - no AVN  - ortho/neurosurgery  f/u  - pain management follows- agree with Gabapentin for neuropathic pain    #Sjogrens.  with PBC but no other features of systemic organ involvement in the past.  - Current complaints not related to sjogrens  - f/u with primary rheumatologist asoutpatient    Please call if you need further assistance  Case was seen and discussed with Dr. Madison Ansari MD  Rheum fellow PGY 4   Pager [phone]/ [phone]

## 2020-06-08 NOTE — CONSULT NOTE ADULT - ASSESSMENT
A/P: 59yFemale with intervertebral disc herniations at L2-3 and L3-4 without any motor or sensory deficits in bilateral lower extremities  - Pain control  - WBAT  - PT/OOB  - No orthopedic surgical intervention necessary at this time  - F/u with Dr. Cleveland 1-2 weeks after discharge, please call [phone] to schedule your appointment A/P: 59yFemale with intervertebral disc herniations at L2-3 and L3-4 without any motor or sensory deficits in bilateral lower extremities  - Pain control  - WBAT  - PT/OOB  - Rec 10mg decadron q6 for 24 hours  - No orthopedic surgical intervention necessary at this time  - F/u with Dr. Cleveland 1-2 weeks after discharge, please call [phone] to schedule your appointment

## 2020-06-08 NOTE — CONSULT NOTE ADULT - SUBJECTIVE AND OBJECTIVE BOX
Orthopedic Spine Surgery Note    59yFemale with Sjogren's, Raynaud's, hypothyroidism, asthma, HLD presents w/ 2 weeks of pain in her back, L hip, and L knee. Patient says the pain originally come out of nowhere and has gotten progressively worse over the past 2 weeks, so much so that she was unable to bear weight or ambulate upon admission on 6/4. Currently, she complains of moderate back pain that radiates into her L leg to her knee. She describes the pain "like a tight string in the back of my leg". She had a denson placed for urinary retention on 6/5 which was removed on 6/8. She denies numbness, tingling, and weakness in her bilateral lower extremities. Denies bowel incontinence and saddle anesthesia. Denies fevers/chills. No other complaints at this time.     HEALTH ISSUES - PROBLEM Dx:  Primary biliary cirrhosis: Primary biliary cirrhosis  Sjogren's syndrome without extraglandular involvement: Sjogren's syndrome without extraglandular involvement  Hypothyroidism, unspecified type: Hypothyroidism, unspecified type  Autoimmune skin disease: Autoimmune skin disease  Acute pain of left knee: Acute pain of left knee    MEDICATIONS  (STANDING):  benzocaine 15 mG/menthol 3.6 mG (Sugar-Free) Lozenge 1 Lozenge Oral once  enoxaparin Injectable 40 milliGRAM(s) SubCutaneous daily  gabapentin 200 milliGRAM(s) Oral every 8 hours  hydroxychloroquine 200 milliGRAM(s) Oral two times a day  lamoTRIgine 150 milliGRAM(s) Oral daily  lamoTRIgine 25 milliGRAM(s) Oral daily  levothyroxine 75 MICROGram(s) Oral daily  lidocaine   Patch 2 Patch Transdermal daily  melatonin 3 milliGRAM(s) Oral at bedtime  polyethylene glycol 3350 17 Gram(s) Oral daily  senna 2 Tablet(s) Oral at bedtime  ursodiol Capsule 300 milliGRAM(s) Oral two times a day      Allergies    aspirin (Anaphylaxis)  ibuprofen (Unknown)  Levaquin (Anaphylaxis)  mirtazapine (Angioedema; Rash)  penicillin (Unknown)        ICU Vital Signs Last 24 Hrs  T(C): 36.9 (08 Jun 2020 09:02), Max: 37 (07 Jun 2020 18:35)  T(F): 98.5 (08 Jun 2020 09:02), Max: 98.6 (07 Jun 2020 18:35)  HR: 70 (08 Jun 2020 09:02) (70 - 93)  BP: 123/66 (08 Jun 2020 09:02) (112/64 - 125/75)  RR: 17 (08 Jun 2020 09:02) (16 - 17)  SpO2: 97% (08 Jun 2020 09:02) (97% - 100%)        Gen: NAD  Neck/Back: skin intact, no deformity  no midline TTP C/T/L/S, no palpable step offs  +straight leg raise  Full AROM/PROM of bilateral LE  Negative clonus  Negative babinski  No saddle anesthesia    Neuro:    Motor:                 L2             L3             L4               L5            S1  R         5/5           5/5          5/5             5/5           5/5  L          5/5          5/5           5/5             5/5           5/5    Sensory:                      L2          L3         L4      L5       S1         (0=absent, 1=impaired, 2=normal, NT=not testable)  R         2            2            2        2        2  L          2            2           2        2         2                            10.3   8.50  )-----------( 368      ( 08 Jun 2020 06:07 )             35.7     06-08    140  |  103  |  22  ----------------------------<  114<H>  3.7   |  25  |  0.70    Ca    9.4      08 Jun 2020 06:07  Phos  4.0     06-08  Mg     2.1     06-08    PT/INR - ( 08 Jun 2020 09:40 )   PT: 11.1 SEC;   INR: 0.97          PTT - ( 08 Jun 2020 09:40 )  PTT:32.7 SEC      Imaging:     MRI L knee:  Focal cartilage loss at the median ridge and medial trochlea with subchondral signal abnormality. No ligament or meniscal tear.      MRI L hip:  Minimal left gluteus minimus insertional tendinosis with mild edema laterally adjacent to the left greater trochanter.    MRI L spine:   Subarticular and left lateral disc extrusion at L3-4 compresses and displaces the exiting left L3 root at the level of the neural foramen. Lateral disc material on the right at the same level encroaches upon the exiting right L3 root without alicia compression. Lateral disc protrusion L2-3 encroaches upon the exiting left L2 root without alicia compression

## 2020-06-09 ENCOUNTER — TRANSCRIPTION ENCOUNTER (OUTPATIENT)
Age: 60
End: 2020-06-09

## 2020-06-09 LAB
ANA TITR SER: NEGATIVE — SIGNIFICANT CHANGE UP
ANION GAP SERPL CALC-SCNC: 13 MMO/L — SIGNIFICANT CHANGE UP (ref 7–14)
ANION GAP SERPL CALC-SCNC: 14 MMO/L — SIGNIFICANT CHANGE UP (ref 7–14)
BASOPHILS # BLD AUTO: 0.02 K/UL — SIGNIFICANT CHANGE UP (ref 0–0.2)
BASOPHILS NFR BLD AUTO: 0.3 % — SIGNIFICANT CHANGE UP (ref 0–2)
BUN SERPL-MCNC: 17 MG/DL — SIGNIFICANT CHANGE UP (ref 7–23)
BUN SERPL-MCNC: 19 MG/DL — SIGNIFICANT CHANGE UP (ref 7–23)
CALCIUM SERPL-MCNC: 10.1 MG/DL — SIGNIFICANT CHANGE UP (ref 8.4–10.5)
CALCIUM SERPL-MCNC: 8.1 MG/DL — LOW (ref 8.4–10.5)
CARDIOLIPIN IGM SER-MCNC: 1.43 GPL — SIGNIFICANT CHANGE UP (ref 0–23)
CARDIOLIPIN IGM SER-MCNC: 1.43 GPL — SIGNIFICANT CHANGE UP (ref 0–23)
CARDIOLIPIN IGM SER-MCNC: 11.56 MPL — HIGH (ref 0–11)
CARDIOLIPIN IGM SER-MCNC: 11.56 MPL — HIGH (ref 0–11)
CHLORIDE SERPL-SCNC: 103 MMOL/L — SIGNIFICANT CHANGE UP (ref 98–107)
CHLORIDE SERPL-SCNC: 85 MMOL/L — LOW (ref 98–107)
CO2 SERPL-SCNC: 19 MMOL/L — LOW (ref 22–31)
CO2 SERPL-SCNC: 24 MMOL/L — SIGNIFICANT CHANGE UP (ref 22–31)
CREAT SERPL-MCNC: 0.55 MG/DL — SIGNIFICANT CHANGE UP (ref 0.5–1.3)
CREAT SERPL-MCNC: 0.64 MG/DL — SIGNIFICANT CHANGE UP (ref 0.5–1.3)
DSDNA AB FLD-ACNC: <0.2 — SIGNIFICANT CHANGE UP
DSDNA AB SER-ACNC: <12 IU/ML — SIGNIFICANT CHANGE UP
ENA RNP IGG SER-ACNC: < 0.2 — SIGNIFICANT CHANGE UP
ENA SM AB TITR SER: < 0.2 — SIGNIFICANT CHANGE UP
ENA SS-A AB FLD IA-ACNC: <0.2 — SIGNIFICANT CHANGE UP
EOSINOPHIL # BLD AUTO: 0 K/UL — SIGNIFICANT CHANGE UP (ref 0–0.5)
EOSINOPHIL NFR BLD AUTO: 0 % — SIGNIFICANT CHANGE UP (ref 0–6)
GLUCOSE BLDC GLUCOMTR-MCNC: 135 MG/DL — HIGH (ref 70–99)
GLUCOSE SERPL-MCNC: 113 MG/DL — HIGH (ref 70–99)
GLUCOSE SERPL-MCNC: 772 MG/DL — CRITICAL HIGH (ref 70–99)
HCT VFR BLD CALC: 30.7 % — LOW (ref 34.5–45)
HGB BLD-MCNC: 9.2 G/DL — LOW (ref 11.5–15.5)
IMM GRANULOCYTES NFR BLD AUTO: 0.6 % — SIGNIFICANT CHANGE UP (ref 0–1.5)
LYMPHOCYTES # BLD AUTO: 0.5 K/UL — LOW (ref 1–3.3)
LYMPHOCYTES # BLD AUTO: 7 % — LOW (ref 13–44)
MAGNESIUM SERPL-MCNC: 1.8 MG/DL — SIGNIFICANT CHANGE UP (ref 1.6–2.6)
MAGNESIUM SERPL-MCNC: 2.1 MG/DL — SIGNIFICANT CHANGE UP (ref 1.6–2.6)
MCHC RBC-ENTMCNC: 22.5 PG — LOW (ref 27–34)
MCHC RBC-ENTMCNC: 30 % — LOW (ref 32–36)
MCV RBC AUTO: 75.1 FL — LOW (ref 80–100)
MONOCYTES # BLD AUTO: 0.14 K/UL — SIGNIFICANT CHANGE UP (ref 0–0.9)
MONOCYTES NFR BLD AUTO: 2 % — SIGNIFICANT CHANGE UP (ref 2–14)
NEUTROPHILS # BLD AUTO: 6.4 K/UL — SIGNIFICANT CHANGE UP (ref 1.8–7.4)
NEUTROPHILS NFR BLD AUTO: 90.1 % — HIGH (ref 43–77)
NRBC # FLD: 0 K/UL — SIGNIFICANT CHANGE UP (ref 0–0)
PHOSPHATE SERPL-MCNC: 3.1 MG/DL — SIGNIFICANT CHANGE UP (ref 2.5–4.5)
PHOSPHATE SERPL-MCNC: 3.7 MG/DL — SIGNIFICANT CHANGE UP (ref 2.5–4.5)
PLATELET # BLD AUTO: 323 K/UL — SIGNIFICANT CHANGE UP (ref 150–400)
PMV BLD: 10.9 FL — SIGNIFICANT CHANGE UP (ref 7–13)
POTASSIUM SERPL-MCNC: 3.3 MMOL/L — LOW (ref 3.5–5.3)
POTASSIUM SERPL-MCNC: 4.2 MMOL/L — SIGNIFICANT CHANGE UP (ref 3.5–5.3)
POTASSIUM SERPL-SCNC: 3.3 MMOL/L — LOW (ref 3.5–5.3)
POTASSIUM SERPL-SCNC: 4.2 MMOL/L — SIGNIFICANT CHANGE UP (ref 3.5–5.3)
RBC # BLD: 4.09 M/UL — SIGNIFICANT CHANGE UP (ref 3.8–5.2)
RBC # FLD: 16.4 % — HIGH (ref 10.3–14.5)
SARS-COV-2 RNA SPEC QL NAA+PROBE: SIGNIFICANT CHANGE UP
SODIUM SERPL-SCNC: 117 MMOL/L — CRITICAL LOW (ref 135–145)
SODIUM SERPL-SCNC: 141 MMOL/L — SIGNIFICANT CHANGE UP (ref 135–145)
WBC # BLD: 7.1 K/UL — SIGNIFICANT CHANGE UP (ref 3.8–10.5)
WBC # FLD AUTO: 7.1 K/UL — SIGNIFICANT CHANGE UP (ref 3.8–10.5)

## 2020-06-09 PROCEDURE — 93010 ELECTROCARDIOGRAM REPORT: CPT

## 2020-06-09 RX ADMIN — HYDROMORPHONE HYDROCHLORIDE 6 MILLIGRAM(S): 2 INJECTION INTRAMUSCULAR; INTRAVENOUS; SUBCUTANEOUS at 22:39

## 2020-06-09 RX ADMIN — HYDROMORPHONE HYDROCHLORIDE 6 MILLIGRAM(S): 2 INJECTION INTRAMUSCULAR; INTRAVENOUS; SUBCUTANEOUS at 06:38

## 2020-06-09 RX ADMIN — Medication 2 MILLIGRAM(S): at 12:12

## 2020-06-09 RX ADMIN — Medication 75 MICROGRAM(S): at 05:13

## 2020-06-09 RX ADMIN — HYDROMORPHONE HYDROCHLORIDE 4 MILLIGRAM(S): 2 INJECTION INTRAMUSCULAR; INTRAVENOUS; SUBCUTANEOUS at 16:57

## 2020-06-09 RX ADMIN — ENOXAPARIN SODIUM 40 MILLIGRAM(S): 100 INJECTION SUBCUTANEOUS at 11:01

## 2020-06-09 RX ADMIN — LIDOCAINE 2 PATCH: 4 CREAM TOPICAL at 17:00

## 2020-06-09 RX ADMIN — LIDOCAINE 2 PATCH: 4 CREAM TOPICAL at 06:28

## 2020-06-09 RX ADMIN — LAMOTRIGINE 25 MILLIGRAM(S): 25 TABLET, ORALLY DISINTEGRATING ORAL at 11:01

## 2020-06-09 RX ADMIN — ONDANSETRON 4 MILLIGRAM(S): 8 TABLET, FILM COATED ORAL at 16:57

## 2020-06-09 RX ADMIN — HYDROMORPHONE HYDROCHLORIDE 6 MILLIGRAM(S): 2 INJECTION INTRAMUSCULAR; INTRAVENOUS; SUBCUTANEOUS at 02:22

## 2020-06-09 RX ADMIN — Medication 102 MILLIGRAM(S): at 11:00

## 2020-06-09 RX ADMIN — LIDOCAINE 2 PATCH: 4 CREAM TOPICAL at 05:12

## 2020-06-09 RX ADMIN — GABAPENTIN 200 MILLIGRAM(S): 400 CAPSULE ORAL at 15:31

## 2020-06-09 RX ADMIN — URSODIOL 300 MILLIGRAM(S): 250 TABLET, FILM COATED ORAL at 18:03

## 2020-06-09 RX ADMIN — POLYETHYLENE GLYCOL 3350 17 GRAM(S): 17 POWDER, FOR SOLUTION ORAL at 11:01

## 2020-06-09 RX ADMIN — Medication 200 MILLIGRAM(S): at 18:04

## 2020-06-09 RX ADMIN — Medication 3 MILLIGRAM(S): at 22:40

## 2020-06-09 RX ADMIN — GABAPENTIN 200 MILLIGRAM(S): 400 CAPSULE ORAL at 22:41

## 2020-06-09 RX ADMIN — URSODIOL 300 MILLIGRAM(S): 250 TABLET, FILM COATED ORAL at 05:13

## 2020-06-09 RX ADMIN — HYDROMORPHONE HYDROCHLORIDE 4 MILLIGRAM(S): 2 INJECTION INTRAMUSCULAR; INTRAVENOUS; SUBCUTANEOUS at 11:01

## 2020-06-09 RX ADMIN — LAMOTRIGINE 150 MILLIGRAM(S): 25 TABLET, ORALLY DISINTEGRATING ORAL at 11:01

## 2020-06-09 RX ADMIN — Medication 2 MILLIGRAM(S): at 18:03

## 2020-06-09 RX ADMIN — TIZANIDINE 2 MILLIGRAM(S): 4 TABLET ORAL at 05:13

## 2020-06-09 RX ADMIN — Medication 102 MILLIGRAM(S): at 05:12

## 2020-06-09 RX ADMIN — TIZANIDINE 2 MILLIGRAM(S): 4 TABLET ORAL at 15:34

## 2020-06-09 RX ADMIN — Medication 200 MILLIGRAM(S): at 05:13

## 2020-06-09 RX ADMIN — GABAPENTIN 200 MILLIGRAM(S): 400 CAPSULE ORAL at 05:13

## 2020-06-09 RX ADMIN — Medication 102 MILLIGRAM(S): at 18:04

## 2020-06-09 NOTE — DISCHARGE NOTE PROVIDER - NSDCMRMEDTOKEN_GEN_ALL_CORE_FT
Bactrim  mg-160 mg oral tablet: 1 tab(s) orally once a day   HYDROmorphone 4 mg oral tablet: 1 tab(s) orally 2-3 times a day ([facility] ref# [id number]: last filled 5/13/19 x #90tabs)  lamoTRIgine 150 mg oral tablet: 1 tab(s) orally once a day (Takes with 25mg tablet: Total daily dose 175mg)  lamoTRIgine 25 mg oral tablet: 1 tab(s) orally once a day (Takes with 150mg tablet: Total daily dose 175mg)  LORazepam 2 mg oral tablet: 1 tab(s) orally 2 times a day, As Needed ([facility] ref# [id number]: Last filled 5/27/19 x #60tabs)  Plaquenil 200 mg oral tablet: 1 tab(s) orally 2 times a day  Synthroid 75 mcg (0.075 mg) oral tablet: 1 tab(s) orally Sunday through Friday  ursodiol 300 mg oral capsule: 1 cap(s) orally 2 times a day bisacodyl 5 mg oral delayed release tablet: 1 tab(s) orally every 12 hours, As needed, Constipation  diphenhydrAMINE 25 mg oral capsule: 1 cap(s) orally every 6 hours, As needed, Rash and/or Itching  gabapentin 100 mg oral capsule: 2 cap(s) orally every 8 hours  HYDROmorphone 2 mg oral tablet: 3 tab(s) orally every 4 hours, As needed, Severe Pain (7 - 10)  HYDROmorphone 4 mg oral tablet: 1 tab(s) orally every 4 hours, As needed, Moderate Pain (4 - 6)  lamoTRIgine 150 mg oral tablet: 1 tab(s) orally once a day (Takes with 25mg tablet: Total daily dose 175mg)  lamoTRIgine 25 mg oral tablet: 1 tab(s) orally once a day (Takes with 150mg tablet: Total daily dose 175mg)  levothyroxine 88 mcg (0.088 mg) oral tablet: 1 tab(s) orally once a day  lidocaine 5% topical film: Apply topically to affected area once a day  LORazepam 2 mg oral tablet: 1 tab(s) orally 2 times a day, As Needed ([facility] ref# [id number]: Last filled 5/27/19 x #60tabs)  melatonin 3 mg oral tablet: 1 tab(s) orally once a day (at bedtime)  Plaquenil 200 mg oral tablet: 1 tab(s) orally 2 times a day  polyethylene glycol 3350 oral powder for reconstitution: 17 gram(s) orally once a day  senna oral tablet: 2 tab(s) orally once a day (at bedtime)  tiZANidine 2 mg oral tablet: 1 tab(s) orally every 6 hours, As needed, Muscle Spasm  ursodiol 300 mg oral capsule: 1 cap(s) orally 2 times a day

## 2020-06-09 NOTE — PROGRESS NOTE ADULT - SUBJECTIVE AND OBJECTIVE BOX
Patient is a 59y old  Female who presents with a chief complaint of left knee and hip pain (08 Jun 2020 10:58)      INTERVAL HPI/OVERNIGHT EVENTS: seen by ortho, started on steroids (IV Decadron)     MEDICATIONS  (STANDING):  benzocaine 15 mG/menthol 3.6 mG (Sugar-Free) Lozenge 1 Lozenge Oral once  dexAMETHasone  IVPB 10 milliGRAM(s) IV Intermittent every 6 hours  enoxaparin Injectable 40 milliGRAM(s) SubCutaneous daily  gabapentin 200 milliGRAM(s) Oral every 8 hours  hydroxychloroquine 200 milliGRAM(s) Oral two times a day  lamoTRIgine 150 milliGRAM(s) Oral daily  lamoTRIgine 25 milliGRAM(s) Oral daily  levothyroxine 75 MICROGram(s) Oral daily  lidocaine   Patch 2 Patch Transdermal daily  melatonin 3 milliGRAM(s) Oral at bedtime  polyethylene glycol 3350 17 Gram(s) Oral daily  senna 2 Tablet(s) Oral at bedtime  ursodiol Capsule 300 milliGRAM(s) Oral two times a day    MEDICATIONS  (PRN):  bisacodyl 5 milliGRAM(s) Oral every 12 hours PRN Constipation  diphenhydrAMINE 25 milliGRAM(s) Oral every 6 hours PRN Rash and/or Itching  HYDROmorphone   Tablet 4 milliGRAM(s) Oral every 4 hours PRN Moderate Pain (4 - 6)  HYDROmorphone   Tablet 6 milliGRAM(s) Oral every 4 hours PRN Severe Pain (7 - 10)  LORazepam     Tablet 2 milliGRAM(s) Oral two times a day PRN Anxiety  ondansetron    Tablet 4 milliGRAM(s) Oral once PRN Nausea  tiZANidine 2 milliGRAM(s) Oral every 6 hours PRN Muscle Spasm        Orders last 24 hours:  COVID-19 PCR: Routine  Specimen Source: Nasopharyngeal (06-08-20 @ 16:11)  Complete Blood Count + Automated Diff: AM Sched. Collection: 09-Jun-2020 06:00 (06-08-20 @ 16:16)  Basic Metabolic Panel w/Mg & Inorg Phos: AM Sched. Collection: 09-Jun-2020 06:00 (06-08-20 @ 16:16)  dexAMETHasone  IVPB: [Ordered as DECADRON IVPB]  10 milliGRAM(s) in dextrose 5% 50 milliLiter(s), IV Intermittent, every 6 hours, infuse over 30 Minute(s), Stop After 4 Doses  Administration Instructions: This is a Look-alike/Sound-alike Medication (06-08-20 @ 17:57)  dexAMETHasone  IVPB: [Ordered as DECADRON IVPB]  10 milliGRAM(s) in dextrose 5% 50 milliLiter(s), IV Intermittent, every 6 hours, infuse over 30 Minute(s), Stop After 24 Hours  Administration Instructions: This is a Look-alike/Sound-alike Medication  Provider's Contact #: [phone] (06-08-20 @ 18:05)  COVID-19 PCR: STAT  Specimen Source: Nasopharyngeal (06-08-20 @ 20:01)  ondansetron    Tablet: [Ordered as ZOFRAN]  4 milliGRAM(s), Oral, once, PRN for Nausea, Stop After 1 Doses (06-08-20 @ 22:58)  Critical Result Notification-Chemistry (06-09-20 @ 07:55)  POCT  Blood Glucose (06-09-20 @ 08:29)  Basic Metabolic Panel w/Mg & Inorg Phos: STAT (06-09-20 @ 08:50)      Allergies    aspirin (Anaphylaxis)  ibuprofen (Unknown)  Levaquin (Anaphylaxis)  mirtazapine (Angioedema; Rash)  penicillin (Unknown)    Intolerances        REVIEW OF SYSTEMS:  CARDIOVASCULAR: No chest pain, palpitations, dizziness, or leg swelling; no shortness of breath     RESPIRATORY: No cough, wheezing, chills or hemoptysis; No shortness of breath    GASTROINTESTINAL: No abdominal or epigastric pain. No nausea, vomiting, or hematemesis; No diarrhea or constipation. No melena or hematochezia.    NEUROLOGICAL: No headaches, memory loss, loss of strength, numbness      PHYSICAL EXAM:  Vital Signs Last 24 Hrs  T(C): 36.6 (09 Jun 2020 05:10), Max: 36.8 (08 Jun 2020 14:51)  T(F): 97.8 (09 Jun 2020 05:10), Max: 98.3 (08 Jun 2020 14:51)  HR: 88 (09 Jun 2020 05:10) (64 - 88)  BP: 134/72 (09 Jun 2020 05:10) (122/70 - 135/71)  BP(mean): --  RR: 17 (09 Jun 2020 05:10) (16 - 17)  SpO2: 99% (09 Jun 2020 05:10) (98% - 100%)    GENERAL: NAD, well-groomed, well-developed  HEAD:  Atraumatic, Normocephalic  EYES: EOMI, PERRLA, conjunctiva and sclera clear  NECK: Supple, No JVD, Normal thyroid  NERVOUS SYSTEM:  Alert & Oriented X3, Good concentration;  and symmetric  CHEST/LUNG: Clear to auscultation bilaterally; No rales, rhonchi, wheezing, or rubs  HEART: S1S2 regular, without murmur, rub nor gallop  ABDOMEN: Soft, Nontender, Nondistended; Bowel sounds present  EXTREMITIES:  2+ Peripheral Pulses, No clubbing, cyanosis, or edema      LABS:                        9.2    7.10  )-----------( 323      ( 09 Jun 2020 06:00 )             30.7     09 Jun 2020 06:00    117    |  85     |  17     ----------------------------<  772    3.3     |  19     |  0.55     Ca    8.1        09 Jun 2020 06:00  Phos  3.1       09 Jun 2020 06:00  Mg     1.8       09 Jun 2020 06:00      PT/INR - ( 08 Jun 2020 09:40 )   PT: 11.1 SEC;   INR: 0.97          PTT - ( 08 Jun 2020 09:40 )  PTT:32.7 SEC  CAPILLARY BLOOD GLUCOSE      POCT Blood Glucose.: 135 mg/dL (09 Jun 2020 08:28)        Consultant(s) Notes Reviewed:  ortho   Assessment and Plan:   Problem/Plan - 1:  ·  Problem: Acute pain of left knee.  Plan: pain management  pt improved on steroids.        Problem/Plan - 2:  ·  Problem: Autoimmune skin disease.  rheum eval appreciated.  see consult.   .     Problem/Plan - 3:  ·  Problem: Hypothyroidism, unspecified type.  Plan: continue dose, pt claims compliance.  Can advance dose to 88 mcgms on d/c     Problem/Plan - 4:  ·  Problem: Sjogren's syndrome without extraglandular involvement.  Plan: Normal inflamatory markers.  Doubt cause of pain.     Problem/Plan - 5:  ·  Problem: Primary biliary cirrhosis.  Plan: Normal liver function  Mild pruritis.     Problem/Plan - 6: abnormal labs- suspect procurement error.  to repeat cbc and chem.     Care Discussed with Consultants/Other Providers: PT,

## 2020-06-09 NOTE — DISCHARGE NOTE PROVIDER - HOSPITAL COURSE
59y Female with PMHx of sjorgens, primary biliary cirrhosis, HLD presents with left knee and hip pain over several days, awoke this morning with escalation or pain. Pain progressively worsened over the last day.             Hospital Course:        Acute pain of left knee    - pain management following - on gabapentin, tizanidine, PO dilaudid    - F/U MRI left knee and hip       - Rheum consulted     - bowel regimen     6/6 - MRI left knee- focal cartilage loss at median ridge and trochlea with subchondral signal abnormality    MRI Left Hip -left gl;uteus minimus  insertional tendenosis with mild edema laterally adjacent to the left greater trochanter    MRI lumbar spne -lateral disc extrusion at L3L4 compressing and displacing Left L3 nerve root at neural foramen, disc protrusion L2 L3 encroaching Left L2 nerve root, lateral disc material on right at level L3 encroaching right L3 root without compression.    - WBAT    - PT/OOB    - No orthopedic surgical intervention necessary at this time    - F/u with Dr. [place] 1-2 weeks after discharge, please call [phone] to schedule your appointment    -Decadron 10mg  IV every 6 hours for 24 hours--        Autoimmune skin disease.      - normal exam.         Hypothyroidism, unspecified type.      -continue dose, pt claims compliance.       -F/ U TFTs--        Urinary retention    - Urology Cx- with +F16F denson placed 6/5    -TOV, patient voiding without difficulty        Sjogren's syndrome without extraglandular involvement.      - Normal inflamatory markers.  Doubt cause of pain.     - on plaquenil BID        Primary biliary cirrhosis.      -Normal liver function    - Mild pruritis.         DVT ppx: lovenox     PT/OT recs--home with Rw 58y/o Female with PMH of sjorgens, primary biliary cirrhosis, HLD presents with left knee and hip pain over several days, awoke this morning with escalation or pain. Pain progressively worsened over the last day.         1. Acute pain of left knee    - pain management following - on gabapentin, tizanidine, lidocaine patch, PO dilaudid - outpatient follow up with Pain Management Service    - bowel regimen    - MRI left knee- focal cartilage loss at median ridge and trochlea with subchondral signal abnormality    - MRI Left Hip -left gluteus minimus  insertional tendinosis with mild edema laterally adjacent to the left greater trochanter    - MRI lumbar spine - lateral disc extrusion at L3L4 compressing and displacing Left L3 nerve root at neural foramen, disc protrusion L2 L3 encroaching Left L2 nerve root, lateral disc material on right at level L3 encroaching right L3 root without compression.    -Ortho consulted - No orthopedic surgical intervention necessary at this time    - WBAT, PT/OOB    - PT: [facility]    - F/u with Dr. [place] 1-2 weeks after discharge, please call [phone] to schedule your appointment    - s/p Decadron 10mg  IV every 6 hours x48 hours with mild improvement in pain        Sjogren's syndrome without extraglandular involvement.      - Normal inflamatory markers.      - Rheumatology following - unlikely to be the source of patient's pain    - on plaquenil BID        Hypothyroidism, unspecified type.      -Increase Levothyroxine to 88mcg qd on d/c        Urinary retention    - Urology consulted for denson placement    - Pt passed TOV, patient voiding without difficulty        Primary biliary cirrhosis.      -Normal liver function    - Mild pruritis.         As per Dr. Melara, patient is medically stable for discharge to [facility].

## 2020-06-09 NOTE — DISCHARGE NOTE PROVIDER - CARE PROVIDERS DIRECT ADDRESSES
,DirectAddress_Unknown,[email or web address] ,DirectAddress_Unknown,[email or web address],DirectAddress_Unknown

## 2020-06-09 NOTE — DISCHARGE NOTE PROVIDER - CARE PROVIDER_API CALL
Bipin Melara  INTERNAL MEDICINE  [address], [address]  [place] [address]  Phone: [phone]  Fax: [phone]  Follow Up Time:     Jefferson [place]  ORTHOPAEDIC SURGERY  [address] [place] [address]  Phone: [phone]  Fax: [phone]  Follow Up Time: Bipin Melara  INTERNAL MEDICINE  [address] [facility] [facility], [address]  [place] [address]  Phone: [phone]  Fax: [phone]  Follow Up Time:     Jefferson [place]  ORTHOPAEDIC SURGERY  [address] [place] [address]  Phone: [phone]  Fax: [phone]  Follow Up Time:     [facility] [place],   [address] [place] [address], [address], [place] [address]  Phone: [phone]  Fax: (   )    -  Follow Up Time:

## 2020-06-09 NOTE — DISCHARGE NOTE PROVIDER - PROVIDER TOKENS
PROVIDER:[TOKEN:[[id number]]],PROVIDER:[TOKEN:[[id number]]] PROVIDER:[TOKEN:[[id number]]],PROVIDER:[TOKEN:[[id number]]],FREE:[LAST:[[facility] [place]],PHONE:[phone]],FAX:[(   )    -],ADDRESS:[[address] [place] [address], [place] [address]]]

## 2020-06-09 NOTE — DISCHARGE NOTE PROVIDER - NSDCCPCAREPLAN_GEN_ALL_CORE_FT
PRINCIPAL DISCHARGE DIAGNOSIS  Diagnosis: Acute left-sided low back pain, unspecified whether sciatica present  Assessment and Plan of Treatment: You had an MRI of Knee, Hip and lumbar spine.  Knee MRI, Focal cartilage loss at the median ridge and medial trochlea with subchondral signal abnormality. No ligament or meniscal tear.  Hip-Minimal left gluteus minimus insertional tendinosis with mild edema laterally adjacent to the left greater trochanter.  Lumbar spine-IMPRESSION:  Subarticular and left lateral disc extrusion at L3-4 compresses and displaces the exiting left L3 root at the level of the neural foramen. Lateral disc material on the right at the same level encroaches upon the exiting right L3 root without alicia compression.. Lateral disc protrusion L2-3 encroaches upon the exiting left L2 root without alicia compression  You were seen by Orthopedics and recommended Decadron to be given for 24 hours.  It is also recommended to follow up with Dr. [place] at [phone] within 2 weeks of discharge for further medical management. Continue pain regiemn as prescribed.      SECONDARY DISCHARGE DIAGNOSES  Diagnosis: Sjogren's syndrome without extraglandular involvement  Assessment and Plan of Treatment: Follow up with your Rheumatologist within 2 weeks of discharge for further medical management. PRINCIPAL DISCHARGE DIAGNOSIS  Diagnosis: Acute left-sided low back pain, unspecified whether sciatica present  Assessment and Plan of Treatment: Pain likely due to L3 radiculopathy. MRI showing interverebral disc herniations at L2-3 and L3-4 and left gluteus minimus insertional tendinosis.  You were seen by Orthopedics, no surgical intervention recommended at this time. You received IV Decadron with some improvement in symptoms. Please follow up with Dr. [place] (Orthopedist) at [phone] within 2 weeks of discharge for further medical management.   You were also seen by the Pain Management service who recommends Lidocaine patch, Gabapentin, Tizanidine, and Dilaudid for pain. Please follow up with Pain Management Service as outpatient for further management. You may follow up with [facility] [place] located at [address] [place] [address], [address], [place] [address]; call [phone] for appointment.      SECONDARY DISCHARGE DIAGNOSES  Diagnosis: Hypothyroidism, unspecified type  Assessment and Plan of Treatment: Levothyroxine was increased to 88mcg daily. Follow up with your primary care physician within 4-6 weeks to repeat your thyroid function tests.    Diagnosis: Sjogren's syndrome without extraglandular involvement  Assessment and Plan of Treatment: Continue Plaquenil as prescribed. Follow up with your Rheumatologist routinely.

## 2020-06-10 PROCEDURE — 99232 SBSQ HOSP IP/OBS MODERATE 35: CPT

## 2020-06-10 RX ORDER — DEXAMETHASONE 0.5 MG/5ML
8 ELIXIR ORAL EVERY 6 HOURS
Refills: 0 | Status: COMPLETED | OUTPATIENT
Start: 2020-06-10 | End: 2020-06-10

## 2020-06-10 RX ADMIN — Medication 200 MILLIGRAM(S): at 06:17

## 2020-06-10 RX ADMIN — LAMOTRIGINE 150 MILLIGRAM(S): 25 TABLET, ORALLY DISINTEGRATING ORAL at 12:43

## 2020-06-10 RX ADMIN — Medication 200 MILLIGRAM(S): at 18:22

## 2020-06-10 RX ADMIN — HYDROMORPHONE HYDROCHLORIDE 6 MILLIGRAM(S): 2 INJECTION INTRAMUSCULAR; INTRAVENOUS; SUBCUTANEOUS at 08:07

## 2020-06-10 RX ADMIN — HYDROMORPHONE HYDROCHLORIDE 6 MILLIGRAM(S): 2 INJECTION INTRAMUSCULAR; INTRAVENOUS; SUBCUTANEOUS at 12:42

## 2020-06-10 RX ADMIN — Medication 75 MICROGRAM(S): at 06:17

## 2020-06-10 RX ADMIN — ENOXAPARIN SODIUM 40 MILLIGRAM(S): 100 INJECTION SUBCUTANEOUS at 12:40

## 2020-06-10 RX ADMIN — LIDOCAINE 2 PATCH: 4 CREAM TOPICAL at 12:44

## 2020-06-10 RX ADMIN — LIDOCAINE 2 PATCH: 4 CREAM TOPICAL at 18:49

## 2020-06-10 RX ADMIN — TIZANIDINE 2 MILLIGRAM(S): 4 TABLET ORAL at 00:45

## 2020-06-10 RX ADMIN — URSODIOL 300 MILLIGRAM(S): 250 TABLET, FILM COATED ORAL at 06:17

## 2020-06-10 RX ADMIN — GABAPENTIN 200 MILLIGRAM(S): 400 CAPSULE ORAL at 13:42

## 2020-06-10 RX ADMIN — Medication 101.6 MILLIGRAM(S): at 23:35

## 2020-06-10 RX ADMIN — LIDOCAINE 2 PATCH: 4 CREAM TOPICAL at 23:38

## 2020-06-10 RX ADMIN — Medication 2 MILLIGRAM(S): at 01:10

## 2020-06-10 RX ADMIN — Medication 2 MILLIGRAM(S): at 12:43

## 2020-06-10 RX ADMIN — HYDROMORPHONE HYDROCHLORIDE 6 MILLIGRAM(S): 2 INJECTION INTRAMUSCULAR; INTRAVENOUS; SUBCUTANEOUS at 03:30

## 2020-06-10 RX ADMIN — TIZANIDINE 2 MILLIGRAM(S): 4 TABLET ORAL at 13:43

## 2020-06-10 RX ADMIN — GABAPENTIN 200 MILLIGRAM(S): 400 CAPSULE ORAL at 06:17

## 2020-06-10 RX ADMIN — Medication 3 MILLIGRAM(S): at 22:27

## 2020-06-10 RX ADMIN — Medication 101.6 MILLIGRAM(S): at 13:43

## 2020-06-10 RX ADMIN — HYDROMORPHONE HYDROCHLORIDE 6 MILLIGRAM(S): 2 INJECTION INTRAMUSCULAR; INTRAVENOUS; SUBCUTANEOUS at 18:22

## 2020-06-10 RX ADMIN — LAMOTRIGINE 25 MILLIGRAM(S): 25 TABLET, ORALLY DISINTEGRATING ORAL at 12:43

## 2020-06-10 RX ADMIN — GABAPENTIN 200 MILLIGRAM(S): 400 CAPSULE ORAL at 22:27

## 2020-06-10 RX ADMIN — URSODIOL 300 MILLIGRAM(S): 250 TABLET, FILM COATED ORAL at 18:22

## 2020-06-10 NOTE — PROGRESS NOTE ADULT - SUBJECTIVE AND OBJECTIVE BOX
Patient is a 59y old  Female who presents with a chief complaint of left knee and hip pain (10 Louis 2020 09:29)    Interval history  pt reports pain improved since Friday. Pain was 10/10 on Thursday and Friday, today reports pain is 6/10.  Pain decreases with rest, increases with movement or activity. She also reports + left knee buckling.  Pt initially denied fall or trauma, however today notes episode in which she was kneeling and "fell backwards" with her knee bent.   Pt found to have glut medius tendninosis, with some swelling ar  MRI ls spine showed L3-L4 disc extrusion compressing L3 nerve root, to follow up with Dr. Cleveland as outpatient.   Pt takes dilaudid 4mg po prn for cholangitis and sjogrens at home.  Allergic to NSAIDS.  She follows with pain management at [facility].     REVIEW OF SYSTEMS  Constitutional - No fever, No weight loss, No fatigue  HEENT - No eye pain, No visual disturbances, No difficulty hearing, No tinnitus, No vertigo, No neck pain  Respiratory - No cough, No wheezing, No shortness of breath  Cardiovascular - No chest pain, No palpitations  Gastrointestinal - No abdominal pain, No nausea, No vomiting, No diarrhea, No constipation  Genitourinary - No dysuria, No frequency, No hematuria, No incontinence  Neurological - No headaches, No memory loss, No loss of strength, No numbness, No tremors  Skin - No itching, No rashes, No lesions   Endocrine - No temperature intolerance  Musculoskeletal - + joint pain  Psychiatric - No depression, No anxiety    PAST MEDICAL & SURGICAL HISTORY  Autoimmune skin disease  Neuropathy  Hypothyroid  Sjogren's syndrome  Lupus  Primary biliary cirrhosis  Asthma  S/P appendectomy  S/P knee surgery  S/P tonsillectomy  S/P cholecystectomy      CURRENT FUNCTIONAL STATUS   ambulates 25 feet with rolling walker contact guard x1    FAMILY HISTORY   Family history of coronary artery disease       RECENT LABS/IMAGING    < from: MR Lumbar Spine No Cont (06.06.20 @ 21:39) >  XAM:  MR SPINE LUMBAR        PROCEDURE DATE:  Jun 6 2020         INTERPRETATION:  INDICATIONS:  Myelopathy, pain and urinary retention Sjogren's syndrome      TECHNIQUE:  Sagittal T1 weighted and T2 weighted images of the lumbosacral spine as well as axial T1 weighted and T2 weighted images were obtained.      COMPARISON EXAMINATION: None.      FINDINGS:    VERTEBRAL BODIES AND DISCS:  Normal.  ALIGNMENT:  No subluxations.  L1-L2 LEVEL:  Normal.  L2-L3 LEVEL: Lateral disc protrusion encroaches upon the exiting left L2 root without alicia compression  L3-L4 LEVEL: Subarticular and left Lateral disc extrusion significantly compresses the exiting left L3 root at the level of the neural foramen. Lateral disc material on the right with a focal protrusion encroaches upon the exiting right L3 root without alicia compression.  L4-L5 LEVEL:  Normal.  L5-S1 LEVEL:  Normal.  SPINAL CANAL:  No other intradural or extradural defects are seen.   CONUS MEDULLARIS: The conus ends at L1  MISCELLANEOUS:None.    IMPRESSION:  Subarticular and left lateral disc extrusion at L3-4 compresses and displaces the exiting left L3 root at the level of the neural foramen. Lateral disc material on the right at the same level encroaches upon the exiting right L3root without alicia compression.. Lateral disc protrusion L2-3 encroaches upon the exiting left L2 root without alicia compression      < end of copied text >    < from: MR Knee No Cont, Left (06.06.20 @ 21:58) >  EXAM:  MR KNEE LT        PROCEDURE DATE:  Jun 6 2020         INTERPRETATION:  Clinical Information: New onset pain.    Comparison: Left knee radiographs from 6/4/2020.    Technique:   MRI of the left knee.  Intravenous Contrast: None.    Findings:    LIGAMENTS: The cruciate and collateral ligaments are intact.  MEDIAL COMPARTMENT: Medial meniscus is intact. The articular cartilage is preserved.   LATERAL COMPARTMENT: Lateral meniscus is intact. There is a superficial chondral fissure at the posterior lateral tibial plateau.  PATELLOFEMORAL COMPARTMENT: There is focal full-thickness cartilage loss at the lateral aspect of the median ridge with mild to moderate subchondral signal abnormality. There is focal full-thickness cartilage loss at the mid medial trochlea with mild subchondral signal abnormality.  EXTENSOR MECHANISM: Quadriceps and patellar tendons are intact.  SYNOVIUM: There is no knee joint effusion. There is no popliteal cyst.   BONES: There is no acute fracture or osteonecrosis.  SOFT TISSUES: Normal.    IMPRESSION:   Focal cartilage loss at the median ridge and medial trochlea with subchondral signal abnormality. No ligament or meniscal tear.    < end of copied text >    < from: MR Hip No Cont, Left (06.06.20 @ 21:40) >    EXAM:  MR HIP LT        PROCEDURE DATE:  Jun 6 2020         INTERPRETATION:  Clinical Information: New onset pain left hip pain.    Comparison: Pelvic and left hip radiographs and CT scan of the abdomen and pelvis from 5/18/2019.    Technique:   MRI of the left hip.  Intravenous Contrast: None.    Findings:     Several sequences are mild to moderately degraded by motion artifact. There is no marrow edema or linear signal abnormality to suggest an acute fracture. There is no hip joint effusion.There is no signal abnormality at the left femoral head to suggest avascular necrosis. There is no detached labral tear.    There is minimal left gluteus minimus insertional tendinosis. There is minimal edema adjacent to the left gluteus minimus insertion and mild soft tissue edema laterally adjacent to this region and the left greater trochanter. The left iliopsoas insertion is intact. The muscle signal is normal.    A catheter is noted within the urinary bladder. Pessary is noted. There is a right nabothian cyst.    Impression:   Minimal left gluteus minimus insertional tendinosis with mild edema laterally adjacent to the left greater trochanter.    < end of copied text >      CBC Full  -  ( 09 Jun 2020 06:00 )  WBC Count : 7.10 K/uL  RBC Count : 4.09 M/uL  Hemoglobin : 9.2 g/dL  Hematocrit : 30.7 %  Platelet Count - Automated : 323 K/uL  Mean Cell Volume : 75.1 fL  Mean Cell Hemoglobin : 22.5 pg  Mean Cell Hemoglobin Concentration : 30.0 %  Auto Neutrophil # : 6.40 K/uL  Auto Lymphocyte # : 0.50 K/uL  Auto Monocyte # : 0.14 K/uL  Auto Eosinophil # : 0.00 K/uL  Auto Basophil # : 0.02 K/uL  Auto Neutrophil % : 90.1 %  Auto Lymphocyte % : 7.0 %  Auto Monocyte % : 2.0 %  Auto Eosinophil % : 0.0 %  Auto Basophil % : 0.3 %    06-09    141  |  103  |  19  ----------------------------<  113<H>  4.2   |  24  |  0.64    Ca    10.1      09 Jun 2020 10:45  Phos  3.7     06-09  Mg     2.1     06-09          VITALS  T(C): 36.9 (06-10-20 @ 06:15), Max: 36.9 (06-09-20 @ 12:47)  HR: 71 (06-10-20 @ 06:15) (71 - 109)  BP: 121/77 (06-10-20 @ 06:15) (111/68 - 138/66)  RR: 18 (06-10-20 @ 06:15) (14 - 18)  SpO2: 100% (06-10-20 @ 06:15) (100% - 100%)  Wt(kg): --    ALLERGIES  aspirin (Anaphylaxis)  ibuprofen (Unknown)  Levaquin (Anaphylaxis)  mirtazapine (Angioedema; Rash)  penicillin (Unknown)      MEDICATIONS   benzocaine 15 mG/menthol 3.6 mG (Sugar-Free) Lozenge 1 Lozenge Oral once  bisacodyl 5 milliGRAM(s) Oral every 12 hours PRN  dexAMETHasone  IVPB 8 milliGRAM(s) IV Intermittent every 6 hours  diphenhydrAMINE 25 milliGRAM(s) Oral every 6 hours PRN  enoxaparin Injectable 40 milliGRAM(s) SubCutaneous daily  gabapentin 200 milliGRAM(s) Oral every 8 hours  HYDROmorphone   Tablet 4 milliGRAM(s) Oral every 4 hours PRN  HYDROmorphone   Tablet 6 milliGRAM(s) Oral every 4 hours PRN  hydroxychloroquine 200 milliGRAM(s) Oral two times a day  lamoTRIgine 150 milliGRAM(s) Oral daily  lamoTRIgine 25 milliGRAM(s) Oral daily  levothyroxine 75 MICROGram(s) Oral daily  lidocaine   Patch 2 Patch Transdermal daily  LORazepam     Tablet 2 milliGRAM(s) Oral two times a day PRN  melatonin 3 milliGRAM(s) Oral at bedtime  polyethylene glycol 3350 17 Gram(s) Oral daily  senna 2 Tablet(s) Oral at bedtime  tiZANidine 2 milliGRAM(s) Oral every 6 hours PRN  ursodiol Capsule 300 milliGRAM(s) Oral two times a day      ----------------------------------------------------------------------------------------  PHYSICAL EXAM-   Constitutional - NAD  HEENT - NCAT, EOMI  Neck - Supple, No limited ROM  Chest - no respiratory distress  Cardiovascular - RRR  Abdomen - soft  Extremities -   No calf tenderness + left knee medial anterior and lateral tenderness to light palpation, no erythema noted, no warmth. mild edema,  + ttp at left greater trochanteric bursa (improved since last exam).    Neurologic Exam -                    Cognitive - Awake, Alert, AAO to self, place, date, year, situation     Communication - Fluent, No dysarthria     Cranial Nerves - CN 2-12 intact     Motor - +SLRT on the left                    LEFT    UE - ShAB 5/5, EF 5/5, EE 5/5, WE 5/5,  5/5                    RIGHT UE - ShAB 5/5, EF 5/5, EE 5/5, WE 5/5,  5/5                    LEFT    LE - HF 4/5, KE 3/5, DF 4/5, PF 4/5  (limited by pain)                    RIGHT LE - HF 4/5, KE 4/5, DF 4/5, PF 4/5   (pain on left hip with moving RLE)     Sensory - Intact to LT, reports tingling in b/l hands and feet due to peripheral neuropathy     Coordination - FTN intact    ----------------------------------------------------------------------------------------  ASSESSMENT/PLAN  59 year old female pmh sjogrens presenting with left hip and knee pain, also with urinary retention (now resolved).  LS spine, L hip and L knee MRI completed.      Pain -gabapentin, lidocaine, dilaudid 6mg po q 4 prn, zanaflex  Dexamthasone IV (2 doses scheduled for today)  can try ice if tolerates  DVT PPX - lovenox  Diet: regular, kosher  continue PT/OT for bed mobility, transfers, ambulation with assistive device      recommend inpatient rehab when medically cleared.    recommend subacute rehab   patient lives up 1 flight of stairs with a long driveway, she lives by herself and is concerned how she will take care of herself. Patient is a 59y old  Female who presents with a chief complaint of left knee and hip pain (10 Louis 2020 09:29)    Interval history  pt reports pain improved since Friday. Pain was 10/10 on Thursday and Friday, today reports pain is 6/10.  Pain decreases with rest, increases with movement or activity. She also reports + left knee buckling.  Pt initially denied fall or trauma, however today notes episode in which she was kneeling and "fell backwards" with her knee bent.   Pt found to have glut medius tendninosis, with some swelling   MRI ls spine showed L3-L4 disc extrusion compressing L3 nerve root, to follow up with Dr. Cleveland as outpatient.   Pt takes dilaudid 4mg po prn for cholangitis and sjogrens at home.  Allergic to NSAIDS.  She follows with pain management at [facility].     REVIEW OF SYSTEMS  Constitutional - No fever, No weight loss, No fatigue  HEENT - No eye pain, No visual disturbances, No difficulty hearing, No tinnitus, No vertigo, No neck pain  Respiratory - No cough, No wheezing, No shortness of breath  Cardiovascular - No chest pain, No palpitations  Gastrointestinal - No abdominal pain, No nausea, No vomiting, No diarrhea, No constipation  Genitourinary - No dysuria, No frequency, No hematuria, No incontinence  Neurological - No headaches, No memory loss, No loss of strength, No numbness, No tremors  Skin - No itching, No rashes, No lesions   Endocrine - No temperature intolerance  Musculoskeletal - + joint pain  Psychiatric - No depression, No anxiety    PAST MEDICAL & SURGICAL HISTORY  Autoimmune skin disease  Neuropathy  Hypothyroid  Sjogren's syndrome  Lupus  Primary biliary cirrhosis  Asthma  S/P appendectomy  S/P knee surgery  S/P tonsillectomy  S/P cholecystectomy      CURRENT FUNCTIONAL STATUS   ambulates 25 feet with rolling walker contact guard x1    FAMILY HISTORY   Family history of coronary artery disease       RECENT LABS/IMAGING    < from: MR Lumbar Spine No Cont (06.06.20 @ 21:39) >  XAM:  MR SPINE LUMBAR        PROCEDURE DATE:  Jun 6 2020         INTERPRETATION:  INDICATIONS:  Myelopathy, pain and urinary retention Sjogren's syndrome      TECHNIQUE:  Sagittal T1 weighted and T2 weighted images of the lumbosacral spine as well as axial T1 weighted and T2 weighted images were obtained.      COMPARISON EXAMINATION: None.      FINDINGS:    VERTEBRAL BODIES AND DISCS:  Normal.  ALIGNMENT:  No subluxations.  L1-L2 LEVEL:  Normal.  L2-L3 LEVEL: Lateral disc protrusion encroaches upon the exiting left L2 root without alicia compression  L3-L4 LEVEL: Subarticular and left Lateral disc extrusion significantly compresses the exiting left L3 root at the level of the neural foramen. Lateral disc material on the right with a focal protrusion encroaches upon the exiting right L3 root without alicia compression.  L4-L5 LEVEL:  Normal.  L5-S1 LEVEL:  Normal.  SPINAL CANAL:  No other intradural or extradural defects are seen.   CONUS MEDULLARIS: The conus ends at L1  MISCELLANEOUS:None.    IMPRESSION:  Subarticular and left lateral disc extrusion at L3-4 compresses and displaces the exiting left L3 root at the level of the neural foramen. Lateral disc material on the right at the same level encroaches upon the exiting right L3root without alicia compression.. Lateral disc protrusion L2-3 encroaches upon the exiting left L2 root without alicia compression      < end of copied text >    < from: MR Knee No Cont, Left (06.06.20 @ 21:58) >  EXAM:  MR KNEE LT        PROCEDURE DATE:  Jun 6 2020         INTERPRETATION:  Clinical Information: New onset pain.    Comparison: Left knee radiographs from 6/4/2020.    Technique:   MRI of the left knee.  Intravenous Contrast: None.    Findings:    LIGAMENTS: The cruciate and collateral ligaments are intact.  MEDIAL COMPARTMENT: Medial meniscus is intact. The articular cartilage is preserved.   LATERAL COMPARTMENT: Lateral meniscus is intact. There is a superficial chondral fissure at the posterior lateral tibial plateau.  PATELLOFEMORAL COMPARTMENT: There is focal full-thickness cartilage loss at the lateral aspect of the median ridge with mild to moderate subchondral signal abnormality. There is focal full-thickness cartilage loss at the mid medial trochlea with mild subchondral signal abnormality.  EXTENSOR MECHANISM: Quadriceps and patellar tendons are intact.  SYNOVIUM: There is no knee joint effusion. There is no popliteal cyst.   BONES: There is no acute fracture or osteonecrosis.  SOFT TISSUES: Normal.    IMPRESSION:   Focal cartilage loss at the median ridge and medial trochlea with subchondral signal abnormality. No ligament or meniscal tear.    < end of copied text >    < from: MR Hip No Cont, Left (06.06.20 @ 21:40) >    EXAM:  MR HIP LT        PROCEDURE DATE:  Jun 6 2020         INTERPRETATION:  Clinical Information: New onset pain left hip pain.    Comparison: Pelvic and left hip radiographs and CT scan of the abdomen and pelvis from 5/18/2019.    Technique:   MRI of the left hip.  Intravenous Contrast: None.    Findings:     Several sequences are mild to moderately degraded by motion artifact. There is no marrow edema or linear signal abnormality to suggest an acute fracture. There is no hip joint effusion.There is no signal abnormality at the left femoral head to suggest avascular necrosis. There is no detached labral tear.    There is minimal left gluteus minimus insertional tendinosis. There is minimal edema adjacent to the left gluteus minimus insertion and mild soft tissue edema laterally adjacent to this region and the left greater trochanter. The left iliopsoas insertion is intact. The muscle signal is normal.    A catheter is noted within the urinary bladder. Pessary is noted. There is a right nabothian cyst.    Impression:   Minimal left gluteus minimus insertional tendinosis with mild edema laterally adjacent to the left greater trochanter.    < end of copied text >      CBC Full  -  ( 09 Jun 2020 06:00 )  WBC Count : 7.10 K/uL  RBC Count : 4.09 M/uL  Hemoglobin : 9.2 g/dL  Hematocrit : 30.7 %  Platelet Count - Automated : 323 K/uL  Mean Cell Volume : 75.1 fL  Mean Cell Hemoglobin : 22.5 pg  Mean Cell Hemoglobin Concentration : 30.0 %  Auto Neutrophil # : 6.40 K/uL  Auto Lymphocyte # : 0.50 K/uL  Auto Monocyte # : 0.14 K/uL  Auto Eosinophil # : 0.00 K/uL  Auto Basophil # : 0.02 K/uL  Auto Neutrophil % : 90.1 %  Auto Lymphocyte % : 7.0 %  Auto Monocyte % : 2.0 %  Auto Eosinophil % : 0.0 %  Auto Basophil % : 0.3 %    06-09    141  |  103  |  19  ----------------------------<  113<H>  4.2   |  24  |  0.64    Ca    10.1      09 Jun 2020 10:45  Phos  3.7     06-09  Mg     2.1     06-09          VITALS  T(C): 36.9 (06-10-20 @ 06:15), Max: 36.9 (06-09-20 @ 12:47)  HR: 71 (06-10-20 @ 06:15) (71 - 109)  BP: 121/77 (06-10-20 @ 06:15) (111/68 - 138/66)  RR: 18 (06-10-20 @ 06:15) (14 - 18)  SpO2: 100% (06-10-20 @ 06:15) (100% - 100%)  Wt(kg): --    ALLERGIES  aspirin (Anaphylaxis)  ibuprofen (Unknown)  Levaquin (Anaphylaxis)  mirtazapine (Angioedema; Rash)  penicillin (Unknown)      MEDICATIONS   benzocaine 15 mG/menthol 3.6 mG (Sugar-Free) Lozenge 1 Lozenge Oral once  bisacodyl 5 milliGRAM(s) Oral every 12 hours PRN  dexAMETHasone  IVPB 8 milliGRAM(s) IV Intermittent every 6 hours  diphenhydrAMINE 25 milliGRAM(s) Oral every 6 hours PRN  enoxaparin Injectable 40 milliGRAM(s) SubCutaneous daily  gabapentin 200 milliGRAM(s) Oral every 8 hours  HYDROmorphone   Tablet 4 milliGRAM(s) Oral every 4 hours PRN  HYDROmorphone   Tablet 6 milliGRAM(s) Oral every 4 hours PRN  hydroxychloroquine 200 milliGRAM(s) Oral two times a day  lamoTRIgine 150 milliGRAM(s) Oral daily  lamoTRIgine 25 milliGRAM(s) Oral daily  levothyroxine 75 MICROGram(s) Oral daily  lidocaine   Patch 2 Patch Transdermal daily  LORazepam     Tablet 2 milliGRAM(s) Oral two times a day PRN  melatonin 3 milliGRAM(s) Oral at bedtime  polyethylene glycol 3350 17 Gram(s) Oral daily  senna 2 Tablet(s) Oral at bedtime  tiZANidine 2 milliGRAM(s) Oral every 6 hours PRN  ursodiol Capsule 300 milliGRAM(s) Oral two times a day      ----------------------------------------------------------------------------------------  PHYSICAL EXAM-   Constitutional - NAD  HEENT - NCAT, EOMI  Neck - Supple, No limited ROM  Chest - no respiratory distress  Cardiovascular - RRR  Abdomen - soft  Extremities -   No calf tenderness + left knee medial anterior and lateral tenderness to light palpation, no erythema noted, no warmth. mild edema,  + ttp at left greater trochanteric bursa (improved since last exam).    Neurologic Exam -                    Cognitive - Awake, Alert, AAO to self, place, date, year, situation     Communication - Fluent, No dysarthria     Cranial Nerves - CN 2-12 intact     Motor - +SLRT on the left                    LEFT    UE - ShAB 5/5, EF 5/5, EE 5/5, WE 5/5,  5/5                    RIGHT UE - ShAB 5/5, EF 5/5, EE 5/5, WE 5/5,  5/5                    LEFT    LE - HF 4/5, KE 3+/5, DF 4/5, PF 4/5  (limited by pain)                    RIGHT LE - HF 4/5, KE 4/5, DF 4/5, PF 4/5   (pain on left hip with moving RLE)     Sensory - Intact to LT, reports tingling in b/l hands and feet due to peripheral neuropathy     Coordination - FTN intact    ----------------------------------------------------------------------------------------  ASSESSMENT/PLAN  59 year old female pmh sjogrens presenting with left hip and knee pain, also with urinary retention (now resolved).  LS spine, L hip and L knee MRI completed.  with L glut medius tendinosis, L3 nerve compression from extruded L3L4 disc    Pain -gabapentin, lidocaine, dilaudid 6mg po q 4 prn, zanaflex  Dexamthasone IV (2 doses scheduled for today)  can try ice if tolerates  DVT PPX - lovenox  Diet: regular, kosher  continue PT/OT for bed mobility, transfers, ambulation with assistive device  pt planned to follow up with Dr. Cleveland (per patient for possible epidural steroid injection)    recommend inpatient rehab when medically cleared.    recommend subacute rehab   patient lives up 1 flight of stairs with a long driveway, she lives by herself and is concerned how she will take care of herself.

## 2020-06-10 NOTE — PROGRESS NOTE ADULT - SUBJECTIVE AND OBJECTIVE BOX
Patient is a 59y old  Female who presents with a chief complaint of left knee and hip pain (09 Jun 2020 17:29)      INTERVAL HPI/OVERNIGHT EVENTS: felt better with steroids.     MEDICATIONS  (STANDING):  benzocaine 15 mG/menthol 3.6 mG (Sugar-Free) Lozenge 1 Lozenge Oral once  dexAMETHasone  IVPB 8 milliGRAM(s) IV Intermittent every 6 hours  enoxaparin Injectable 40 milliGRAM(s) SubCutaneous daily  gabapentin 200 milliGRAM(s) Oral every 8 hours  hydroxychloroquine 200 milliGRAM(s) Oral two times a day  lamoTRIgine 150 milliGRAM(s) Oral daily  lamoTRIgine 25 milliGRAM(s) Oral daily  levothyroxine 75 MICROGram(s) Oral daily  lidocaine   Patch 2 Patch Transdermal daily  melatonin 3 milliGRAM(s) Oral at bedtime  polyethylene glycol 3350 17 Gram(s) Oral daily  senna 2 Tablet(s) Oral at bedtime  ursodiol Capsule 300 milliGRAM(s) Oral two times a day    MEDICATIONS  (PRN):  bisacodyl 5 milliGRAM(s) Oral every 12 hours PRN Constipation  diphenhydrAMINE 25 milliGRAM(s) Oral every 6 hours PRN Rash and/or Itching  HYDROmorphone   Tablet 4 milliGRAM(s) Oral every 4 hours PRN Moderate Pain (4 - 6)  HYDROmorphone   Tablet 6 milliGRAM(s) Oral every 4 hours PRN Severe Pain (7 - 10)  LORazepam     Tablet 2 milliGRAM(s) Oral two times a day PRN Anxiety  tiZANidine 2 milliGRAM(s) Oral every 6 hours PRN Muscle Spasm        Orders last 24 hours:  12 Lead ECG:   Provider's Contact #: [phone] (06-09-20 @ 17:55)  dexAMETHasone  IVPB: [Ordered as DECADRON IVPB]  8 milliGRAM(s) in dextrose 5% 50 milliLiter(s), IV Intermittent, every 6 hours, infuse over 30 Minute(s), Stop After 2 Doses  Indication: back pain  Special Instructions: first dose now, repeat after six hours.  Administration Instructions: This is a Look-alike/Sound-alike Medication  Provider's Contact #: [phone] (06-10-20 @ 09:17)      Allergies    aspirin (Anaphylaxis)  ibuprofen (Unknown)  Levaquin (Anaphylaxis)  mirtazapine (Angioedema; Rash)  penicillin (Unknown)    Intolerances        REVIEW OF SYSTEMS:  CARDIOVASCULAR: No chest pain, palpitations, dizziness, or leg swelling; no shortness of breath     RESPIRATORY: No cough, wheezing, chills or hemoptysis; No shortness of breath    GASTROINTESTINAL: No abdominal or epigastric pain. No nausea, vomiting, or hematemesis; No diarrhea or constipation. No melena or hematochezia.    NEUROLOGICAL: No headaches, memory loss, loss of strength, numbness      PHYSICAL EXAM:  Vital Signs Last 24 Hrs  T(C): 36.9 (10 Louis 2020 06:15), Max: 36.9 (09 Jun 2020 12:47)  T(F): 98.4 (10 Louis 2020 06:15), Max: 98.4 (09 Jun 2020 12:47)  HR: 71 (10 Louis 2020 06:15) (71 - 109)  BP: 121/77 (10 Louis 2020 06:15) (111/68 - 138/66)  BP(mean): --  RR: 18 (10 Louis 2020 06:15) (14 - 18)  SpO2: 100% (10 Louis 2020 06:15) (100% - 100%)    GENERAL: NAD, well-groomed, well-developed  HEAD:  Atraumatic, Normocephalic  EYES: EOMI, PERRLA, conjunctiva and sclera clear  NECK: Supple, No JVD, Normal thyroid  NERVOUS SYSTEM:  Alert & Oriented X3, Good concentration;  and symmetric  CHEST/LUNG: Clear to auscultation bilaterally; No rales, rhonchi, wheezing, or rubs  HEART: S1S2 regular, without murmur, rub nor gallop  ABDOMEN: Soft, Nontender, Nondistended; Bowel sounds present  EXTREMITIES:  2+ Peripheral Pulses, No clubbing, cyanosis, or edema      LABS:    09 Jun 2020 10:45    141    |  103    |  19     ----------------------------<  113    4.2     |  24     |  0.64     Ca    10.1       09 Jun 2020 10:45  Phos  3.7       09 Jun 2020 10:45  Mg     2.1       09 Jun 2020 10:45      PT/INR - ( 08 Jun 2020 09:40 )   PT: 11.1 SEC;   INR: 0.97          PTT - ( 08 Jun 2020 09:40 )  PTT:32.7 SEC  CAPILLARY BLOOD GLUCOSE        Assessment and Plan:   Problem/Plan - 1:  ·  Problem: Acute pain of left knee.  Plan: pain management  pt improved on steroids.  Will try additional two doses of steroids,        Problem/Plan - 2:  ·  Problem: Autoimmune skin disease.  rheum eval appreciated.  see consult.   .     Problem/Plan - 3:  ·  Problem: Hypothyroidism, unspecified type.  Plan: continue dose, pt claims compliance.  Can advance dose to 88 mcgms on d/c     Problem/Plan - 4:  ·  Problem: Sjogren's syndrome without extraglandular involvement.  Plan: Normal inflamatory markers.  Doubt cause of pain.     Problem/Plan - 5:  ·  Problem: Primary biliary cirrhosis.  Plan: Normal liver function  Mild pruritis.      D/C planning.  Pt can only ambulate to bathroom.  ? D/C to rehab.?   :

## 2020-06-11 ENCOUNTER — TRANSCRIPTION ENCOUNTER (OUTPATIENT)
Age: 60
End: 2020-06-11

## 2020-06-11 VITALS
RESPIRATION RATE: 18 BRPM | TEMPERATURE: 98 F | SYSTOLIC BLOOD PRESSURE: 132 MMHG | OXYGEN SATURATION: 98 % | HEART RATE: 89 BPM | DIASTOLIC BLOOD PRESSURE: 72 MMHG

## 2020-06-11 RX ORDER — LEVOTHYROXINE SODIUM 125 MCG
1 TABLET ORAL
Qty: 0 | Refills: 0 | DISCHARGE
Start: 2020-06-11

## 2020-06-11 RX ORDER — TIZANIDINE 4 MG/1
1 TABLET ORAL
Qty: 0 | Refills: 0 | DISCHARGE
Start: 2020-06-11

## 2020-06-11 RX ORDER — LEVOTHYROXINE SODIUM 125 MCG
1 TABLET ORAL
Qty: 0 | Refills: 0 | DISCHARGE

## 2020-06-11 RX ORDER — HYDROMORPHONE HYDROCHLORIDE 2 MG/ML
1 INJECTION INTRAMUSCULAR; INTRAVENOUS; SUBCUTANEOUS
Qty: 0 | Refills: 0 | DISCHARGE

## 2020-06-11 RX ORDER — GABAPENTIN 400 MG/1
2 CAPSULE ORAL
Qty: 0 | Refills: 0 | DISCHARGE
Start: 2020-06-11

## 2020-06-11 RX ORDER — HYDROMORPHONE HYDROCHLORIDE 2 MG/ML
1 INJECTION INTRAMUSCULAR; INTRAVENOUS; SUBCUTANEOUS
Qty: 0 | Refills: 0 | DISCHARGE
Start: 2020-06-11

## 2020-06-11 RX ORDER — LIDOCAINE 4 G/100G
1 CREAM TOPICAL
Qty: 0 | Refills: 0 | DISCHARGE
Start: 2020-06-11

## 2020-06-11 RX ORDER — HYDROMORPHONE HYDROCHLORIDE 2 MG/ML
3 INJECTION INTRAMUSCULAR; INTRAVENOUS; SUBCUTANEOUS
Qty: 0 | Refills: 0 | DISCHARGE
Start: 2020-06-11

## 2020-06-11 RX ORDER — DIPHENHYDRAMINE HCL 50 MG
1 CAPSULE ORAL
Qty: 0 | Refills: 0 | DISCHARGE
Start: 2020-06-11

## 2020-06-11 RX ORDER — LANOLIN ALCOHOL/MO/W.PET/CERES
1 CREAM (GRAM) TOPICAL
Qty: 0 | Refills: 0 | DISCHARGE
Start: 2020-06-11

## 2020-06-11 RX ORDER — POLYETHYLENE GLYCOL 3350 17 G/17G
17 POWDER, FOR SOLUTION ORAL
Qty: 0 | Refills: 0 | DISCHARGE
Start: 2020-06-11

## 2020-06-11 RX ORDER — LEVOTHYROXINE SODIUM 125 MCG
88 TABLET ORAL DAILY
Refills: 0 | Status: DISCONTINUED | OUTPATIENT
Start: 2020-06-12 | End: 2020-06-11

## 2020-06-11 RX ORDER — SENNA PLUS 8.6 MG/1
2 TABLET ORAL
Qty: 0 | Refills: 0 | DISCHARGE
Start: 2020-06-11

## 2020-06-11 RX ADMIN — URSODIOL 300 MILLIGRAM(S): 250 TABLET, FILM COATED ORAL at 05:59

## 2020-06-11 RX ADMIN — LIDOCAINE 2 PATCH: 4 CREAM TOPICAL at 12:27

## 2020-06-11 RX ADMIN — Medication 2 MILLIGRAM(S): at 00:28

## 2020-06-11 RX ADMIN — HYDROMORPHONE HYDROCHLORIDE 4 MILLIGRAM(S): 2 INJECTION INTRAMUSCULAR; INTRAVENOUS; SUBCUTANEOUS at 05:59

## 2020-06-11 RX ADMIN — Medication 200 MILLIGRAM(S): at 05:58

## 2020-06-11 RX ADMIN — ENOXAPARIN SODIUM 40 MILLIGRAM(S): 100 INJECTION SUBCUTANEOUS at 12:28

## 2020-06-11 RX ADMIN — GABAPENTIN 200 MILLIGRAM(S): 400 CAPSULE ORAL at 05:58

## 2020-06-11 RX ADMIN — HYDROMORPHONE HYDROCHLORIDE 4 MILLIGRAM(S): 2 INJECTION INTRAMUSCULAR; INTRAVENOUS; SUBCUTANEOUS at 00:26

## 2020-06-11 RX ADMIN — Medication 75 MICROGRAM(S): at 05:58

## 2020-06-11 RX ADMIN — HYDROMORPHONE HYDROCHLORIDE 4 MILLIGRAM(S): 2 INJECTION INTRAMUSCULAR; INTRAVENOUS; SUBCUTANEOUS at 12:28

## 2020-06-11 NOTE — PROGRESS NOTE ADULT - SUBJECTIVE AND OBJECTIVE BOX
Patient is a 59y old  Female who presents with a chief complaint of left knee and hip pain (10 Louis 2020 11:48)      INTERVAL HPI/OVERNIGHT EVENTS: none    MEDICATIONS  (STANDING):  benzocaine 15 mG/menthol 3.6 mG (Sugar-Free) Lozenge 1 Lozenge Oral once  enoxaparin Injectable 40 milliGRAM(s) SubCutaneous daily  gabapentin 200 milliGRAM(s) Oral every 8 hours  hydroxychloroquine 200 milliGRAM(s) Oral two times a day  lamoTRIgine 150 milliGRAM(s) Oral daily  lamoTRIgine 25 milliGRAM(s) Oral daily  levothyroxine 75 MICROGram(s) Oral daily  lidocaine   Patch 2 Patch Transdermal daily  melatonin 3 milliGRAM(s) Oral at bedtime  polyethylene glycol 3350 17 Gram(s) Oral daily  senna 2 Tablet(s) Oral at bedtime  ursodiol Capsule 300 milliGRAM(s) Oral two times a day    MEDICATIONS  (PRN):  bisacodyl 5 milliGRAM(s) Oral every 12 hours PRN Constipation  diphenhydrAMINE 25 milliGRAM(s) Oral every 6 hours PRN Rash and/or Itching  HYDROmorphone   Tablet 4 milliGRAM(s) Oral every 4 hours PRN Moderate Pain (4 - 6)  HYDROmorphone   Tablet 6 milliGRAM(s) Oral every 4 hours PRN Severe Pain (7 - 10)  LORazepam     Tablet 2 milliGRAM(s) Oral two times a day PRN Anxiety  tiZANidine 2 milliGRAM(s) Oral every 6 hours PRN Muscle Spasm        Orders last 24 hours:      Allergies    aspirin (Anaphylaxis)  ibuprofen (Unknown)  Levaquin (Anaphylaxis)  mirtazapine (Angioedema; Rash)  penicillin (Unknown)    Intolerances        REVIEW OF SYSTEMS:  CARDIOVASCULAR: No chest pain, palpitations, dizziness, or leg swelling; no shortness of breath     RESPIRATORY: No cough, wheezing, chills or hemoptysis; No shortness of breath    GASTROINTESTINAL: No abdominal or epigastric pain. No nausea, vomiting, or hematemesis; No diarrhea or constipation. No melena or hematochezia.    NEUROLOGICAL: No headaches, memory loss, loss of strength, numbness      PHYSICAL EXAM:  Vital Signs Last 24 Hrs  T(C): 36.7 (11 Jun 2020 05:39), Max: 37 (10 Louis 2020 22:21)  T(F): 98 (11 Jun 2020 05:39), Max: 98.6 (10 Louis 2020 22:21)  HR: 79 (11 Jun 2020 05:39) (70 - 98)  BP: 127/87 (11 Jun 2020 05:39) (103/60 - 127/87)  BP(mean): --  RR: 17 (11 Jun 2020 05:39) (16 - 18)  SpO2: 99% (11 Jun 2020 05:39) (95% - 99%)    GENERAL: NAD, well-groomed, well-developed  HEAD:  Atraumatic, Normocephalic  EYES: EOMI, PERRLA, conjunctiva and sclera clear  NECK: Supple, No JVD, Normal thyroid  NERVOUS SYSTEM:  Alert & Oriented X3, Good concentration;  and symmetric  CHEST/LUNG: Clear to auscultation bilaterally; No rales, rhonchi, wheezing, or rubs  HEART: S1S2 regular, without murmur, rub nor gallop  ABDOMEN: Soft, Nontender, Nondistended; Bowel sounds present  EXTREMITIES:  2+ Peripheral Pulses, No clubbing, cyanosis, or edema      LABS:      Ca    10.1       09 Jun 2020 10:45        CAPILLARY BLOOD GLUCOSE                 Assessment and Plan:   Problem/Plan - 1:  ·  Problem: Acute pain of left knee.  Plan: pain management  pt improved on steroids.    For short term rehab     Problem/Plan - 2:  ·  Problem: Autoimmune skin disease.  rheum eval appreciated.  see consult.   .     Problem/Plan - 3:  ·  Problem: Hypothyroidism, unspecified type.  Plan: continue dose, pt claims compliance.  Can advance dose to 88 mcgms on d/c     Problem/Plan - 4:  ·  Problem: Sjogren's syndrome without extraglandular involvement.  Plan: Normal inflamatory markers.  Doubt cause of pain.     Problem/Plan - 5:  ·  Problem: Primary biliary cirrhosis.  Plan: Normal liver function  Mild pruritis.      D/C planning.  Pt can only ambulate to bathroom.  ? D/C to rehab.?       See physiatry note.

## 2020-06-11 NOTE — PROGRESS NOTE ADULT - REASON FOR ADMISSION
left knee and hip pain

## 2020-06-11 NOTE — DIETITIAN INITIAL EVALUATION ADULT. - REASON INDICATOR FOR ASSESSMENT
Length Of Stay Nutrition Assessment   Unable to conduct in-person interview or nutrition-focused physical exam due to COVID19 contact precautions. Pt is covid negative

## 2020-06-11 NOTE — DIETITIAN INITIAL EVALUATION ADULT. - OTHER INFO
58 y/o F with PMH Sjogren's, biliary cirrhosis p/w L knee and hip pain. Collateral obtained from RN. Attempted to contact pt but unable to reach.    PO intake >75% meals. No GI distress. No chewing or swallowing difficulties at this time.     Wt history per HIE: 12/2019: 63.5 kg; 6/4/2020 71.5 kg. 8 kg wt gain x 6 months.

## 2020-06-11 NOTE — DIETITIAN INITIAL EVALUATION ADULT. - PERTINENT MEDS FT
MEDICATIONS  (STANDING):  benzocaine 15 mG/menthol 3.6 mG (Sugar-Free) Lozenge 1 Lozenge Oral once  enoxaparin Injectable 40 milliGRAM(s) SubCutaneous daily  gabapentin 200 milliGRAM(s) Oral every 8 hours  hydroxychloroquine 200 milliGRAM(s) Oral two times a day  lamoTRIgine 150 milliGRAM(s) Oral daily  lamoTRIgine 25 milliGRAM(s) Oral daily  levothyroxine 75 MICROGram(s) Oral daily  lidocaine   Patch 2 Patch Transdermal daily  melatonin 3 milliGRAM(s) Oral at bedtime  polyethylene glycol 3350 17 Gram(s) Oral daily  senna 2 Tablet(s) Oral at bedtime  ursodiol Capsule 300 milliGRAM(s) Oral two times a day

## 2020-06-11 NOTE — DISCHARGE NOTE NURSING/CASE MANAGEMENT/SOCIAL WORK - PATIENT PORTAL LINK FT
You can access the FollowMyHealth Patient Portal offered by [facility] by registering at the following website: [email or web address]. By joining [employer]’s FollowMyHealth portal, you will also be able to view your health information using other applications (apps) compatible with our system.

## 2020-06-16 LAB
B2 GLYCOPROT1 AB SER QL: POSITIVE — SIGNIFICANT CHANGE UP
CCP AB SER-ACNC: <8 — SIGNIFICANT CHANGE UP

## 2020-06-26 ENCOUNTER — APPOINTMENT (OUTPATIENT)
Dept: ORTHOPEDIC SURGERY | Facility: CLINIC | Age: 60
End: 2020-06-26
Payer: COMMERCIAL

## 2020-06-26 VITALS
HEIGHT: 59 IN | TEMPERATURE: 98 F | BODY MASS INDEX: 30.24 KG/M2 | HEART RATE: 106 BPM | SYSTOLIC BLOOD PRESSURE: 125 MMHG | DIASTOLIC BLOOD PRESSURE: 77 MMHG | WEIGHT: 150 LBS

## 2020-06-26 PROCEDURE — 99204 OFFICE O/P NEW MOD 45 MIN: CPT

## 2020-06-26 RX ORDER — METHYLPREDNISOLONE 4 MG/1
4 TABLET ORAL
Qty: 1 | Refills: 1 | Status: ACTIVE | COMMUNITY
Start: 2020-06-26 | End: 1900-01-01

## 2020-07-20 ENCOUNTER — APPOINTMENT (OUTPATIENT)
Dept: ORTHOPEDIC SURGERY | Facility: CLINIC | Age: 60
End: 2020-07-20
Payer: COMMERCIAL

## 2020-07-20 VITALS — BODY MASS INDEX: 26.21 KG/M2 | HEIGHT: 59 IN | WEIGHT: 130 LBS

## 2020-07-20 VITALS — TEMPERATURE: 97.2 F

## 2020-07-20 PROCEDURE — 99214 OFFICE O/P EST MOD 30 MIN: CPT

## 2020-07-24 RX ORDER — CYCLOBENZAPRINE HYDROCHLORIDE 10 MG/1
10 TABLET, FILM COATED ORAL 3 TIMES DAILY
Qty: 30 | Refills: 0 | Status: DISCONTINUED | COMMUNITY
Start: 2020-07-23 | End: 2020-07-24

## 2020-07-24 RX ORDER — METHYLPREDNISOLONE 4 MG/1
4 TABLET ORAL
Qty: 1 | Refills: 1 | Status: ACTIVE | COMMUNITY
Start: 2020-07-24 | End: 1900-01-01

## 2020-07-27 ENCOUNTER — APPOINTMENT (OUTPATIENT)
Dept: ORTHOPEDIC SURGERY | Facility: CLINIC | Age: 60
End: 2020-07-27
Payer: COMMERCIAL

## 2020-07-27 PROCEDURE — 99213 OFFICE O/P EST LOW 20 MIN: CPT | Mod: 95

## 2020-07-27 NOTE — REASON FOR VISIT
[Home] : at home, [unfilled] , at the time of the visit. [Medical Office: ([facility])___] : at the medical office located in  [Verbal consent obtained from patient] : the patient, [unfilled]

## 2020-07-27 NOTE — DISCUSSION/SUMMARY
[Surgical risks reviewed] : Surgical risks reviewed [de-identified] : Left L3 radiculopathy.\par Discussed all options.\par refusing surgery\par Risks of surgery include infection, dural tear, nerve root injury, reherniation, future back pain, future leg pain, retained fragment, hematoma, urinary retention, worsening leg symptoms, footdrop, anesthetic risks, blood transfusion risks, positioning pain, visceral and vascular injury, deep vein thrombosis, pulmonary embolus, and death. All risks were explained not exclusive to the ones mentioned alternatives were discussed and all questions were answered the patient agrees and understands the above and is in complete agreement with the plan. \par Going for injection.\par Will F/U after that. \par

## 2020-07-27 NOTE — HISTORY OF PRESENT ILLNESS
[Stable] : stable [de-identified] : Called to discuss treatment.\par Saw Dr. Wu-no injection.\par Took Medrol.\par On day 4 of Medrol.\par No fever chills sweats nausea vomiting no bowel or bladder dysfunction, no recent weight loss or gain no night pain. This history is in addition to the intake form that I personally reviewed.

## 2020-07-27 NOTE — PHYSICAL EXAM
[Antalgic] : antalgic [SLR] : negative straight leg raise [de-identified] : 5 out of 5 motor strength, sensation is intact and symmetrical full range of motion flexion extension and rotation, no palpatory tenderness full range of motion of hips knees shoulders and elbows (all four extremities), no atrophy, negative straight leg raise, no swelling, normal ambulation, no apparent distress skin is intact, no upper or lower extremity instability, alert and oriented x 3 and normal mood. Normal finger-to nose test.

## 2020-08-13 ENCOUNTER — APPOINTMENT (OUTPATIENT)
Dept: ORTHOPEDIC SURGERY | Facility: CLINIC | Age: 60
End: 2020-08-13
Payer: COMMERCIAL

## 2020-08-13 VITALS — TEMPERATURE: 98 F

## 2020-08-13 DIAGNOSIS — M54.16 RADICULOPATHY, LUMBAR REGION: ICD-10-CM

## 2020-08-13 DIAGNOSIS — M48.07 SPINAL STENOSIS, LUMBOSACRAL REGION: ICD-10-CM

## 2020-08-13 DIAGNOSIS — M51.26 OTHER INTERVERTEBRAL DISC DISPLACEMENT, LUMBAR REGION: ICD-10-CM

## 2020-08-13 PROCEDURE — 72100 X-RAY EXAM L-S SPINE 2/3 VWS: CPT

## 2020-08-13 PROCEDURE — 99214 OFFICE O/P EST MOD 30 MIN: CPT

## 2020-08-13 NOTE — DISCUSSION/SUMMARY
[Surgical risks reviewed] : Surgical risks reviewed [de-identified] : Left L3 radiculopathy.\par She's getting worse and is miserable.\par Decreased ambulation and using a cane.\par Failed all conservative management including therapy, injections and medications.\par She has weakness in her left quadriceps.\par She like to undergo surgery.\par L3 for decompression left-sided discectomy.\par Risks of surgery include infection, dural tear, nerve root injury, reherniation, future back pain, future leg pain, retained fragment, hematoma, urinary retention, worsening leg symptoms, footdrop, anesthetic risks, blood transfusion risks, positioning pain, visceral and vascular injury, deep vein thrombosis, pulmonary embolus, and death. Patient will need spinal orthosis pre and post operatively. All risks were explained not exclusive to the ones mentioned alternatives were discussed and all questions were answered the patient agrees and understands the above and is in complete agreement with the plan. \par Surgical website was provided.\par A new MRI with axial T2 images will be obtained as the last MRI did not have those images.\par Surgeon we'll tentatively be scheduled in the next several weeks pending medical clearance and insurance approval.\par Her brother agrees with the plan.

## 2020-08-13 NOTE — PHYSICAL EXAM
[Antalgic] : antalgic [Cane] : ambulates with cane [SLR] : positive straight leg raise [de-identified] : MRI lumbar 6/2020 in system-left L3-4 herniation-reviewed with patient and .\par AP.lat lumbar-decreased disc height-reviewed with the patient.\par \par  [de-identified] : 3 of 5 left quadriceps with a decreased patellar reflex on the left and decreased sensation left thigh otherwise, 5 out of 5 motor strength, sensation is intact and symmetrical full range of motion flexion extension and rotation, no palpatory tenderness full range of motion of hips knees shoulders and elbows (all four extremities), no atrophy, negative straight leg raise, no swelling, normal ambulation, no apparent distress skin is intact, no upper or lower extremity instability, alert and oriented x 3 and normal mood. Normal finger-to nose test.

## 2020-08-13 NOTE — HISTORY OF PRESENT ILLNESS
[Worsening] : worsening [de-identified] : Ms. MARINO ELDRIDGE  is a 59 year old female who presents to the office for a follow-up visit.   She did an injection without any relief.  In fact she feels worse.  She has left lumbar pain that radiates into her left thigh.  She is taking Lyrica about 2x a day and Dilaudid 4mg 2-4 x/day for the pain without any relief. \par Left thigh pain.\par Here with .\par She is worsening and ambulating with a cane.\par She has failed injections and therapy as well as medications.\par No fever chills sweats nausea vomiting no bowel or bladder dysfunction, no recent weight loss or gain no night pain. This history is in addition to the intake form that I personally reviewed.

## 2020-08-17 RX ORDER — CYCLOBENZAPRINE HYDROCHLORIDE 5 MG/1
5 TABLET, FILM COATED ORAL
Qty: 10 | Refills: 0 | Status: ACTIVE | COMMUNITY
Start: 2020-08-17 | End: 1900-01-01

## 2020-08-18 RX ORDER — CYCLOBENZAPRINE HYDROCHLORIDE 5 MG/1
5 TABLET, FILM COATED ORAL 3 TIMES DAILY
Qty: 60 | Refills: 0 | Status: ACTIVE | COMMUNITY
Start: 2020-08-18 | End: 1900-01-01

## 2020-08-21 ENCOUNTER — APPOINTMENT (OUTPATIENT)
Dept: ORTHOPEDIC SURGERY | Facility: CLINIC | Age: 60
End: 2020-08-21
Payer: COMMERCIAL

## 2020-08-21 PROCEDURE — ZZZZZ: CPT

## 2020-08-23 ENCOUNTER — APPOINTMENT (OUTPATIENT)
Dept: MRI IMAGING | Facility: CLINIC | Age: 60
End: 2020-08-23
Payer: COMMERCIAL

## 2020-08-23 ENCOUNTER — OUTPATIENT (OUTPATIENT)
Dept: OUTPATIENT SERVICES | Facility: HOSPITAL | Age: 60
LOS: 1 days | End: 2020-08-23
Payer: COMMERCIAL

## 2020-08-23 ENCOUNTER — RESULT REVIEW (OUTPATIENT)
Age: 60
End: 2020-08-23

## 2020-08-23 DIAGNOSIS — Z90.49 ACQUIRED ABSENCE OF OTHER SPECIFIED PARTS OF DIGESTIVE TRACT: Chronic | ICD-10-CM

## 2020-08-23 DIAGNOSIS — Z98.89 OTHER SPECIFIED POSTPROCEDURAL STATES: Chronic | ICD-10-CM

## 2020-08-23 DIAGNOSIS — Z90.89 ACQUIRED ABSENCE OF OTHER ORGANS: Chronic | ICD-10-CM

## 2020-08-23 DIAGNOSIS — M54.16 RADICULOPATHY, LUMBAR REGION: ICD-10-CM

## 2020-08-23 DIAGNOSIS — M51.26 OTHER INTERVERTEBRAL DISC DISPLACEMENT, LUMBAR REGION: ICD-10-CM

## 2020-08-23 PROCEDURE — 72148 MRI LUMBAR SPINE W/O DYE: CPT

## 2020-08-23 PROCEDURE — 72148 MRI LUMBAR SPINE W/O DYE: CPT | Mod: 26

## 2020-08-30 ENCOUNTER — APPOINTMENT (OUTPATIENT)
Dept: DISASTER EMERGENCY | Facility: CLINIC | Age: 60
End: 2020-08-30

## 2020-09-01 ENCOUNTER — APPOINTMENT (OUTPATIENT)
Dept: ORTHOPEDIC SURGERY | Facility: HOSPITAL | Age: 60
End: 2020-09-01

## 2020-09-03 ENCOUNTER — EMERGENCY (EMERGENCY)
Facility: HOSPITAL | Age: 60
LOS: 1 days | Discharge: LEFT BEFORE DISCHARGE | End: 2020-09-03
Attending: STUDENT IN AN ORGANIZED HEALTH CARE EDUCATION/TRAINING PROGRAM | Admitting: STUDENT IN AN ORGANIZED HEALTH CARE EDUCATION/TRAINING PROGRAM
Payer: COMMERCIAL

## 2020-09-03 VITALS
OXYGEN SATURATION: 100 % | SYSTOLIC BLOOD PRESSURE: 136 MMHG | RESPIRATION RATE: 18 BRPM | DIASTOLIC BLOOD PRESSURE: 86 MMHG | HEART RATE: 127 BPM | TEMPERATURE: 99 F

## 2020-09-03 DIAGNOSIS — Z98.89 OTHER SPECIFIED POSTPROCEDURAL STATES: Chronic | ICD-10-CM

## 2020-09-03 DIAGNOSIS — Z90.89 ACQUIRED ABSENCE OF OTHER ORGANS: Chronic | ICD-10-CM

## 2020-09-03 DIAGNOSIS — Z90.49 ACQUIRED ABSENCE OF OTHER SPECIFIED PARTS OF DIGESTIVE TRACT: Chronic | ICD-10-CM

## 2020-09-03 LAB
ALBUMIN SERPL ELPH-MCNC: 4.7 G/DL — SIGNIFICANT CHANGE UP (ref 3.3–5)
ALP SERPL-CCNC: 79 U/L — SIGNIFICANT CHANGE UP (ref 40–120)
ALT FLD-CCNC: 18 U/L — SIGNIFICANT CHANGE UP (ref 4–33)
ANION GAP SERPL CALC-SCNC: 15 MMO/L — HIGH (ref 7–14)
APTT BLD: 30.4 SEC — SIGNIFICANT CHANGE UP (ref 27–36.3)
AST SERPL-CCNC: 13 U/L — SIGNIFICANT CHANGE UP (ref 4–32)
BASOPHILS # BLD AUTO: 0.08 K/UL — SIGNIFICANT CHANGE UP (ref 0–0.2)
BASOPHILS NFR BLD AUTO: 0.8 % — SIGNIFICANT CHANGE UP (ref 0–2)
BILIRUB SERPL-MCNC: < 0.2 MG/DL — LOW (ref 0.2–1.2)
BUN SERPL-MCNC: 17 MG/DL — SIGNIFICANT CHANGE UP (ref 7–23)
CALCIUM SERPL-MCNC: 9.9 MG/DL — SIGNIFICANT CHANGE UP (ref 8.4–10.5)
CHLORIDE SERPL-SCNC: 100 MMOL/L — SIGNIFICANT CHANGE UP (ref 98–107)
CO2 SERPL-SCNC: 26 MMOL/L — SIGNIFICANT CHANGE UP (ref 22–31)
CREAT SERPL-MCNC: 0.84 MG/DL — SIGNIFICANT CHANGE UP (ref 0.5–1.3)
CRP SERPL-MCNC: < 4 MG/L — SIGNIFICANT CHANGE UP
EOSINOPHIL # BLD AUTO: 0.08 K/UL — SIGNIFICANT CHANGE UP (ref 0–0.5)
EOSINOPHIL NFR BLD AUTO: 0.8 % — SIGNIFICANT CHANGE UP (ref 0–6)
ERYTHROCYTE [SEDIMENTATION RATE] IN BLOOD: 99 MM/HR — HIGH (ref 4–25)
GLUCOSE SERPL-MCNC: 124 MG/DL — HIGH (ref 70–99)
HCT VFR BLD CALC: 41.8 % — SIGNIFICANT CHANGE UP (ref 34.5–45)
HGB BLD-MCNC: 12.4 G/DL — SIGNIFICANT CHANGE UP (ref 11.5–15.5)
IMM GRANULOCYTES NFR BLD AUTO: 1.2 % — SIGNIFICANT CHANGE UP (ref 0–1.5)
INR BLD: 1.05 — SIGNIFICANT CHANGE UP (ref 0.88–1.16)
LYMPHOCYTES # BLD AUTO: 1.63 K/UL — SIGNIFICANT CHANGE UP (ref 1–3.3)
LYMPHOCYTES # BLD AUTO: 15.6 % — SIGNIFICANT CHANGE UP (ref 13–44)
MAGNESIUM SERPL-MCNC: 2.1 MG/DL — SIGNIFICANT CHANGE UP (ref 1.6–2.6)
MCHC RBC-ENTMCNC: 24.4 PG — LOW (ref 27–34)
MCHC RBC-ENTMCNC: 29.7 % — LOW (ref 32–36)
MCV RBC AUTO: 82.1 FL — SIGNIFICANT CHANGE UP (ref 80–100)
MONOCYTES # BLD AUTO: 0.96 K/UL — HIGH (ref 0–0.9)
MONOCYTES NFR BLD AUTO: 9.2 % — SIGNIFICANT CHANGE UP (ref 2–14)
NEUTROPHILS # BLD AUTO: 7.59 K/UL — HIGH (ref 1.8–7.4)
NEUTROPHILS NFR BLD AUTO: 72.4 % — SIGNIFICANT CHANGE UP (ref 43–77)
NRBC # FLD: 0 K/UL — SIGNIFICANT CHANGE UP (ref 0–0)
PHOSPHATE SERPL-MCNC: 3.7 MG/DL — SIGNIFICANT CHANGE UP (ref 2.5–4.5)
PLATELET # BLD AUTO: 442 K/UL — HIGH (ref 150–400)
PMV BLD: 9.7 FL — SIGNIFICANT CHANGE UP (ref 7–13)
POTASSIUM SERPL-MCNC: 3.5 MMOL/L — SIGNIFICANT CHANGE UP (ref 3.5–5.3)
POTASSIUM SERPL-SCNC: 3.5 MMOL/L — SIGNIFICANT CHANGE UP (ref 3.5–5.3)
PROT SERPL-MCNC: 7.3 G/DL — SIGNIFICANT CHANGE UP (ref 6–8.3)
PROTHROM AB SERPL-ACNC: 12 SEC — SIGNIFICANT CHANGE UP (ref 10.6–13.6)
RBC # BLD: 5.09 M/UL — SIGNIFICANT CHANGE UP (ref 3.8–5.2)
RBC # FLD: 20 % — HIGH (ref 10.3–14.5)
SODIUM SERPL-SCNC: 141 MMOL/L — SIGNIFICANT CHANGE UP (ref 135–145)
WBC # BLD: 10.47 K/UL — SIGNIFICANT CHANGE UP (ref 3.8–10.5)
WBC # FLD AUTO: 10.47 K/UL — SIGNIFICANT CHANGE UP (ref 3.8–10.5)

## 2020-09-03 PROCEDURE — 93010 ELECTROCARDIOGRAM REPORT: CPT

## 2020-09-03 PROCEDURE — 71046 X-RAY EXAM CHEST 2 VIEWS: CPT | Mod: 26

## 2020-09-03 PROCEDURE — 99284 EMERGENCY DEPT VISIT MOD MDM: CPT | Mod: 25

## 2020-09-03 PROCEDURE — 74177 CT ABD & PELVIS W/CONTRAST: CPT | Mod: 26

## 2020-09-03 RX ORDER — METOCLOPRAMIDE HCL 10 MG
10 TABLET ORAL ONCE
Refills: 0 | Status: COMPLETED | OUTPATIENT
Start: 2020-09-03 | End: 2020-09-03

## 2020-09-03 RX ORDER — SODIUM CHLORIDE 9 MG/ML
1000 INJECTION INTRAMUSCULAR; INTRAVENOUS; SUBCUTANEOUS ONCE
Refills: 0 | Status: COMPLETED | OUTPATIENT
Start: 2020-09-03 | End: 2020-09-03

## 2020-09-03 RX ADMIN — SODIUM CHLORIDE 1000 MILLILITER(S): 9 INJECTION INTRAMUSCULAR; INTRAVENOUS; SUBCUTANEOUS at 11:52

## 2020-09-03 RX ADMIN — Medication 10 MILLIGRAM(S): at 11:52

## 2020-09-03 NOTE — ED PROVIDER NOTE - INTERPRETATION
normal sinus rhythm, Normal axis, Normal [place] interval and QRS complex. There are no acute ischemic ST or T-wave changes./sinus tach

## 2020-09-03 NOTE — ED PROVIDER NOTE - CLINICAL SUMMARY MEDICAL DECISION MAKING FREE TEXT BOX
59F with extensive pmhx presenting with CC of abd pain PE: tachy, afebrile, +RUQ pain without rigidity, clear lungs Ddx: Concerning for biliary stent occlusion, vs other intraabdominal pathology Plan: Labs, ct, urine, cxr, reassess

## 2020-09-03 NOTE — ED PROVIDER NOTE - OBJECTIVE STATEMENT
59F pmhx of sjorgens, primary biliary cirrhosis, HLD presents with abdominal pain x 1.5/2 days. Worst in the RUQ, associated with nausea, no vomiting. Hx of biliary stents placed in [place] approx 2 years ago. No problem with the stents since. Associated symptoms include headache, b/l eye pain, cough, decreased urinary frequency. took home pain meds without relief.

## 2020-09-03 NOTE — ED ADULT TRIAGE NOTE - CHIEF COMPLAINT QUOTE
Pt states that after taking Tylenol 25 minutes ago for headache--pt c/o rt upper quadrant pain and nausea. Pt c/o light flashing in rt eye  and pain and nausea with headache--pt states same thing happened yesterday after taking tylenol and lasted 6  hours. Pt c/o feeling very itchy/hot  Pt very taccycardic Pt states that after taking Tylenol 25 minutes ago for headache--pt c/o rt upper quadrant pain and nausea. Pt c/o light flashing in rt eye  and pain and nausea with headache--pt states same thing happened yesterday after taking tylenol and lasted 6  hours. Pt c/o feeling very itchy/hot--pt c/o throat itchness and pain  Pt very taccycardic

## 2020-09-03 NOTE — ED PROVIDER NOTE - PHYSICAL EXAMINATION
Const: Well-nourished, Well-developed, appearing stated age.  Eyes: no conjunctival injection, and symmetrical lids. EOMI.   HEENT: Head NCAT, no lesions. Atraumatic external nose and ears. +Dry MM.  Neck: Symmetric, trachea midline.   CVS: +S1/S2, Peripheral pulses 2+ and equal in all extremities.  RESP: Unlabored respiratory effort. Clear to auscultation bilaterally, no crackles.   GI: +RUQ tenderness to deep palpation, rest of abd is soft, nontender/nondistended, + CVA tenderness b/l.   MSK: Normocephalic/Atraumatic, Lower Extremities w/o calf tenderness or edema b/l.   Skin: Warm, dry and intact.   Neuro: CNs II-XII grossly intact. Motor & Sensation grossly intact.  Psych: Awake, Alert, & Oriented (AAO) x3. Appropriate mood and affect.

## 2020-09-03 NOTE — ED PROVIDER NOTE - ATTENDING CONTRIBUTION TO CARE
59F h/o Sjorgens, primary biliary cirrhosis, HLD p/w abd pain and headache. Symptoms began last night. States she developed generalized abd pain with nausea and diarrhea, no vomiting. Reports 10 episodes of loose, nonblody BMs. Headache also generalized though worst behind R eye, + photophobia. Reports bright light intermittently in R eye but denies loss of vision or floaters. No fever/chills. No recent travels/sick contacts. No joint pain. No cp, sob, urinary symptoms.  Gen: nad  HEENT: EOMI, no temporal tenderness b/l, PERRL, visual fields intact  CV: tachy, regular  Pulm: clear lungs  Abd: generalized tenderness without rebound/guarding, normal bowel sounds, nondistended  Ext: no edema/swelling  Ext: rash to dorsum of L foot, c/w previous rashes due to "autoimmune condition"  MDM: 59F h/o Sjorgens, primary biliary cirrhosis, HLD p/w abd pain, n/d and HA x2 days - given previous surgical history w/ diffuse tenderness and nausea will get Ct ab/pel to r.o intra-abd pathology, check basic labs + ESR/CRP to assess possible Temporal Arteritis as cause of HA with autoimmune history, though given lack of true temporal tenderness less likely. IVF and reglan for symptom relief

## 2020-09-03 NOTE — ED PROVIDER NOTE - NS ED ROS FT
CONST: no fevers, no chills, no trauma  EYES: + pain, no blurry vision   ENT: no sore throat, no epistaxis, no rhinorrhea  CV: no chest pain, no palpitations, no orthopnea, no extremity pain or swelling  RESP: no shortness of breath, no cough, no sputum, no pleurisy, no wheezing  ABD: + abdominal pain, no nausea, no vomiting, no diarrhea, no black or bloody stool  : no dysuria, no hematuria, +decreased frequency, no urgency  MSK: no back pain, no neck pain, no extremity pain  NEURO: + headache, no sensory disturbances, no focal weakness, no dizziness  HEME: no easy bleeding or bruising  SKIN: no diaphoresis, no rash

## 2020-09-03 NOTE — ED ADULT NURSE NOTE - CHIEF COMPLAINT QUOTE
Pt states that after taking Tylenol 25 minutes ago for headache--pt c/o rt upper quadrant pain and nausea. Pt c/o light flashing in rt eye  and pain and nausea with headache--pt states same thing happened yesterday after taking tylenol and lasted 6  hours. Pt c/o feeling very itchy/hot--pt c/o throat itchness and pain  Pt very taccycardic

## 2020-09-04 ENCOUNTER — APPOINTMENT (OUTPATIENT)
Dept: ORTHOPEDIC SURGERY | Facility: CLINIC | Age: 60
End: 2020-09-04

## 2020-09-13 DIAGNOSIS — Z01.818 ENCOUNTER FOR OTHER PREPROCEDURAL EXAMINATION: ICD-10-CM

## 2020-09-14 ENCOUNTER — APPOINTMENT (OUTPATIENT)
Dept: DISASTER EMERGENCY | Facility: CLINIC | Age: 60
End: 2020-09-14

## 2020-09-14 ENCOUNTER — APPOINTMENT (OUTPATIENT)
Dept: ORTHOPEDIC SURGERY | Facility: CLINIC | Age: 60
End: 2020-09-14

## 2020-09-17 ENCOUNTER — APPOINTMENT (OUTPATIENT)
Dept: ORTHOPEDIC SURGERY | Facility: HOSPITAL | Age: 60
End: 2020-09-17

## 2020-09-30 ENCOUNTER — APPOINTMENT (OUTPATIENT)
Dept: ORTHOPEDIC SURGERY | Facility: CLINIC | Age: 60
End: 2020-09-30

## 2020-10-05 ENCOUNTER — APPOINTMENT (OUTPATIENT)
Dept: OTOLARYNGOLOGY | Facility: CLINIC | Age: 60
End: 2020-10-05

## 2020-10-05 NOTE — H&P ADULT - PROBLEM SELECTOR PROBLEM 4
Please notify pt that prescription refills sent for 6 months. Please schedule for annual   Other chronic pain

## 2020-10-30 ENCOUNTER — INPATIENT (INPATIENT)
Facility: HOSPITAL | Age: 60
LOS: 0 days | Discharge: HOME | End: 2020-10-31
Attending: INTERNAL MEDICINE | Admitting: INTERNAL MEDICINE
Payer: COMMERCIAL

## 2020-10-30 VITALS
SYSTOLIC BLOOD PRESSURE: 116 MMHG | OXYGEN SATURATION: 96 % | HEIGHT: 59 IN | HEART RATE: 73 BPM | RESPIRATION RATE: 18 BRPM | TEMPERATURE: 98 F | DIASTOLIC BLOOD PRESSURE: 60 MMHG

## 2020-10-30 DIAGNOSIS — K74.3 PRIMARY BILIARY CIRRHOSIS: ICD-10-CM

## 2020-10-30 DIAGNOSIS — B37.81 CANDIDAL ESOPHAGITIS: ICD-10-CM

## 2020-10-30 DIAGNOSIS — R29.898 OTHER SYMPTOMS AND SIGNS INVOLVING THE MUSCULOSKELETAL SYSTEM: ICD-10-CM

## 2020-10-30 DIAGNOSIS — I10 ESSENTIAL (PRIMARY) HYPERTENSION: ICD-10-CM

## 2020-10-30 DIAGNOSIS — Z90.89 ACQUIRED ABSENCE OF OTHER ORGANS: Chronic | ICD-10-CM

## 2020-10-30 DIAGNOSIS — M35.00 SJOGREN SYNDROME, UNSPECIFIED: ICD-10-CM

## 2020-10-30 DIAGNOSIS — Z98.89 OTHER SPECIFIED POSTPROCEDURAL STATES: Chronic | ICD-10-CM

## 2020-10-30 DIAGNOSIS — Z90.49 ACQUIRED ABSENCE OF OTHER SPECIFIED PARTS OF DIGESTIVE TRACT: Chronic | ICD-10-CM

## 2020-10-30 DIAGNOSIS — E03.9 HYPOTHYROIDISM, UNSPECIFIED: ICD-10-CM

## 2020-10-30 DIAGNOSIS — Z29.9 ENCOUNTER FOR PROPHYLACTIC MEASURES, UNSPECIFIED: ICD-10-CM
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
## 2020-10-30 DIAGNOSIS — E87.6 HYPOKALEMIA: ICD-10-CM

## 2020-10-30 DIAGNOSIS — M51.16 INTERVERTEBRAL DISC DISORDERS WITH RADICULOPATHY, LUMBAR REGION: ICD-10-CM

## 2020-10-30 DIAGNOSIS — M06.9 RHEUMATOID ARTHRITIS, UNSPECIFIED: ICD-10-CM

## 2020-10-30 LAB
ALBUMIN SERPL ELPH-MCNC: 4.5 G/DL — SIGNIFICANT CHANGE UP (ref 3.3–5)
ALP SERPL-CCNC: 76 U/L — SIGNIFICANT CHANGE UP (ref 40–120)
ALT FLD-CCNC: 26 U/L — SIGNIFICANT CHANGE UP (ref 4–33)
AMPHET UR-MCNC: POSITIVE — SIGNIFICANT CHANGE UP
ANION GAP SERPL CALC-SCNC: 11 MMO/L — SIGNIFICANT CHANGE UP (ref 7–14)
APAP SERPL-MCNC: < 15 UG/ML — LOW (ref 15–25)
APPEARANCE UR: SIGNIFICANT CHANGE UP
APTT BLD: 31 SEC — SIGNIFICANT CHANGE UP (ref 27–36.3)
AST SERPL-CCNC: 23 U/L — SIGNIFICANT CHANGE UP (ref 4–32)
BACTERIA # UR AUTO: HIGH
BARBITURATES UR SCN-MCNC: NEGATIVE — SIGNIFICANT CHANGE UP
BASOPHILS # BLD AUTO: 0.05 K/UL — SIGNIFICANT CHANGE UP (ref 0–0.2)
BASOPHILS NFR BLD AUTO: 0.6 % — SIGNIFICANT CHANGE UP (ref 0–2)
BENZODIAZ UR-MCNC: POSITIVE — SIGNIFICANT CHANGE UP
BILIRUB SERPL-MCNC: < 0.2 MG/DL — LOW (ref 0.2–1.2)
BILIRUB UR-MCNC: NEGATIVE — SIGNIFICANT CHANGE UP
BLD GP AB SCN SERPL QL: NEGATIVE — SIGNIFICANT CHANGE UP
BLOOD UR QL VISUAL: SIGNIFICANT CHANGE UP
BUN SERPL-MCNC: 17 MG/DL — SIGNIFICANT CHANGE UP (ref 7–23)
CALCIUM SERPL-MCNC: 9.7 MG/DL — SIGNIFICANT CHANGE UP (ref 8.4–10.5)
CANNABINOIDS UR-MCNC: NEGATIVE — SIGNIFICANT CHANGE UP
CHLORIDE SERPL-SCNC: 102 MMOL/L — SIGNIFICANT CHANGE UP (ref 98–107)
CK MB BLD-MCNC: < 1 NG/ML — LOW (ref 1–4.7)
CK MB BLD-MCNC: SIGNIFICANT CHANGE UP (ref 0–2.5)
CK SERPL-CCNC: 29 U/L — SIGNIFICANT CHANGE UP (ref 25–170)
CO2 SERPL-SCNC: 30 MMOL/L — SIGNIFICANT CHANGE UP (ref 22–31)
COCAINE METAB.OTHER UR-MCNC: NEGATIVE — SIGNIFICANT CHANGE UP
COLOR SPEC: YELLOW — SIGNIFICANT CHANGE UP
CREAT SERPL-MCNC: 0.77 MG/DL — SIGNIFICANT CHANGE UP (ref 0.5–1.3)
EOSINOPHIL # BLD AUTO: 0.3 K/UL — SIGNIFICANT CHANGE UP (ref 0–0.5)
EOSINOPHIL NFR BLD AUTO: 3.8 % — SIGNIFICANT CHANGE UP (ref 0–6)
EPI CELLS # UR: SIGNIFICANT CHANGE UP
ETHANOL BLD-MCNC: < 10 MG/DL — SIGNIFICANT CHANGE UP
GLUCOSE SERPL-MCNC: 114 MG/DL — HIGH (ref 70–99)
GLUCOSE UR-MCNC: NEGATIVE — SIGNIFICANT CHANGE UP
HCT VFR BLD CALC: 37.2 % — SIGNIFICANT CHANGE UP (ref 34.5–45)
HGB BLD-MCNC: 10.9 G/DL — LOW (ref 11.5–15.5)
IMM GRANULOCYTES NFR BLD AUTO: 0.5 % — SIGNIFICANT CHANGE UP (ref 0–1.5)
INR BLD: 0.96 — SIGNIFICANT CHANGE UP (ref 0.88–1.16)
KETONES UR-MCNC: NEGATIVE — SIGNIFICANT CHANGE UP
LEUKOCYTE ESTERASE UR-ACNC: SIGNIFICANT CHANGE UP
LYMPHOCYTES # BLD AUTO: 1.39 K/UL — SIGNIFICANT CHANGE UP (ref 1–3.3)
LYMPHOCYTES # BLD AUTO: 17.6 % — SIGNIFICANT CHANGE UP (ref 13–44)
MAGNESIUM SERPL-MCNC: 2.2 MG/DL — SIGNIFICANT CHANGE UP (ref 1.6–2.6)
MCHC RBC-ENTMCNC: 25.1 PG — LOW (ref 27–34)
MCHC RBC-ENTMCNC: 29.3 % — LOW (ref 32–36)
MCV RBC AUTO: 85.5 FL — SIGNIFICANT CHANGE UP (ref 80–100)
METHADONE UR-MCNC: NEGATIVE — SIGNIFICANT CHANGE UP
MONOCYTES # BLD AUTO: 0.84 K/UL — SIGNIFICANT CHANGE UP (ref 0–0.9)
MONOCYTES NFR BLD AUTO: 10.6 % — SIGNIFICANT CHANGE UP (ref 2–14)
NEUTROPHILS # BLD AUTO: 5.29 K/UL — SIGNIFICANT CHANGE UP (ref 1.8–7.4)
NEUTROPHILS NFR BLD AUTO: 66.9 % — SIGNIFICANT CHANGE UP (ref 43–77)
NITRITE UR-MCNC: NEGATIVE — SIGNIFICANT CHANGE UP
NRBC # FLD: 0 K/UL — SIGNIFICANT CHANGE UP (ref 0–0)
OPIATES UR-MCNC: POSITIVE — SIGNIFICANT CHANGE UP
OXYCODONE UR-MCNC: SIGNIFICANT CHANGE UP
PCP UR-MCNC: POSITIVE — SIGNIFICANT CHANGE UP
PH UR: 6.5 — SIGNIFICANT CHANGE UP (ref 5–8)
PLATELET # BLD AUTO: 336 K/UL — SIGNIFICANT CHANGE UP (ref 150–400)
PMV BLD: 10.3 FL — SIGNIFICANT CHANGE UP (ref 7–13)
POTASSIUM SERPL-MCNC: 3.1 MMOL/L — LOW (ref 3.5–5.3)
POTASSIUM SERPL-SCNC: 3.1 MMOL/L — LOW (ref 3.5–5.3)
PROT SERPL-MCNC: 6.8 G/DL — SIGNIFICANT CHANGE UP (ref 6–8.3)
PROT UR-MCNC: 70 — SIGNIFICANT CHANGE UP
PROTHROM AB SERPL-ACNC: 11.1 SEC — SIGNIFICANT CHANGE UP (ref 10.6–13.6)
RBC # BLD: 4.35 M/UL — SIGNIFICANT CHANGE UP (ref 3.8–5.2)
RBC # FLD: 14.4 % — SIGNIFICANT CHANGE UP (ref 10.3–14.5)
RBC CASTS # UR COMP ASSIST: SIGNIFICANT CHANGE UP (ref 0–?)
RH IG SCN BLD-IMP: POSITIVE — SIGNIFICANT CHANGE UP
SALICYLATES SERPL-MCNC: < 5 MG/DL — LOW (ref 15–30)
SARS-COV-2 RNA SPEC QL NAA+PROBE: SIGNIFICANT CHANGE UP
SODIUM SERPL-SCNC: 143 MMOL/L — SIGNIFICANT CHANGE UP (ref 135–145)
SP GR SPEC: 1.02 — SIGNIFICANT CHANGE UP (ref 1–1.04)
TSH SERPL-MCNC: 1.37 UIU/ML — SIGNIFICANT CHANGE UP (ref 0.27–4.2)
UROBILINOGEN FLD QL: NORMAL — SIGNIFICANT CHANGE UP
WBC # BLD: 7.91 K/UL — SIGNIFICANT CHANGE UP (ref 3.8–10.5)
WBC # FLD AUTO: 7.91 K/UL — SIGNIFICANT CHANGE UP (ref 3.8–10.5)
WBC UR QL: >50 — HIGH (ref 0–?)

## 2020-10-30 PROCEDURE — 99223 1ST HOSP IP/OBS HIGH 75: CPT

## 2020-10-30 PROCEDURE — 72148 MRI LUMBAR SPINE W/O DYE: CPT | Mod: 26

## 2020-10-30 PROCEDURE — 72146 MRI CHEST SPINE W/O DYE: CPT | Mod: 26

## 2020-10-30 PROCEDURE — 72125 CT NECK SPINE W/O DYE: CPT | Mod: 26

## 2020-10-30 PROCEDURE — 70450 CT HEAD/BRAIN W/O DYE: CPT | Mod: 26

## 2020-10-30 PROCEDURE — 93010 ELECTROCARDIOGRAM REPORT: CPT

## 2020-10-30 PROCEDURE — 71045 X-RAY EXAM CHEST 1 VIEW: CPT | Mod: 26

## 2020-10-30 PROCEDURE — 99285 EMERGENCY DEPT VISIT HI MDM: CPT

## 2020-10-30 RX ORDER — DILTIAZEM HCL 120 MG
180 CAPSULE, EXT RELEASE 24 HR ORAL DAILY
Refills: 0 | Status: DISCONTINUED | OUTPATIENT
Start: 2020-10-30 | End: 2020-10-30

## 2020-10-30 RX ORDER — LEVOTHYROXINE SODIUM 125 MCG
75 TABLET ORAL DAILY
Refills: 0 | Status: DISCONTINUED | OUTPATIENT
Start: 2020-10-30 | End: 2020-10-31

## 2020-10-30 RX ORDER — HYDROXYCHLOROQUINE SULFATE 200 MG
200 TABLET ORAL
Refills: 0 | Status: DISCONTINUED | OUTPATIENT
Start: 2020-10-30 | End: 2020-10-31

## 2020-10-30 RX ORDER — HYDROXYCHLOROQUINE SULFATE 200 MG
1 TABLET ORAL
Qty: 0 | Refills: 0 | DISCHARGE

## 2020-10-30 RX ORDER — LAMOTRIGINE 25 MG/1
1 TABLET, ORALLY DISINTEGRATING ORAL
Qty: 0 | Refills: 0 | DISCHARGE

## 2020-10-30 RX ORDER — MORPHINE SULFATE 50 MG/1
4 CAPSULE, EXTENDED RELEASE ORAL ONCE
Refills: 0 | Status: DISCONTINUED | OUTPATIENT
Start: 2020-10-30 | End: 2020-10-30

## 2020-10-30 RX ORDER — POTASSIUM CHLORIDE 20 MEQ
40 PACKET (EA) ORAL ONCE
Refills: 0 | Status: COMPLETED | OUTPATIENT
Start: 2020-10-30 | End: 2020-10-30

## 2020-10-30 RX ORDER — MECLIZINE HCL 12.5 MG
25 TABLET ORAL ONCE
Refills: 0 | Status: COMPLETED | OUTPATIENT
Start: 2020-10-30 | End: 2020-10-30

## 2020-10-30 RX ORDER — LEVOTHYROXINE SODIUM 125 MCG
1 TABLET ORAL
Qty: 0 | Refills: 0 | DISCHARGE

## 2020-10-30 RX ORDER — DILTIAZEM HCL 120 MG
1 CAPSULE, EXT RELEASE 24 HR ORAL
Qty: 0 | Refills: 0 | DISCHARGE

## 2020-10-30 RX ORDER — ENOXAPARIN SODIUM 100 MG/ML
40 INJECTION SUBCUTANEOUS DAILY
Refills: 0 | Status: DISCONTINUED | OUTPATIENT
Start: 2020-10-30 | End: 2020-10-31

## 2020-10-30 RX ORDER — POTASSIUM CHLORIDE 20 MEQ
40 PACKET (EA) ORAL EVERY 4 HOURS
Refills: 0 | Status: DISCONTINUED | OUTPATIENT
Start: 2020-10-30 | End: 2020-10-30

## 2020-10-30 RX ORDER — ACETAMINOPHEN 500 MG
650 TABLET ORAL ONCE
Refills: 0 | Status: COMPLETED | OUTPATIENT
Start: 2020-10-30 | End: 2020-10-30

## 2020-10-30 RX ORDER — CEFEPIME 1 G/1
1000 INJECTION, POWDER, FOR SOLUTION INTRAMUSCULAR; INTRAVENOUS ONCE
Refills: 0 | Status: COMPLETED | OUTPATIENT
Start: 2020-10-30 | End: 2020-10-30

## 2020-10-30 RX ORDER — URSODIOL 250 MG/1
300 TABLET, FILM COATED ORAL
Refills: 0 | Status: DISCONTINUED | OUTPATIENT
Start: 2020-10-30 | End: 2020-10-31

## 2020-10-30 RX ORDER — LAMOTRIGINE 25 MG/1
225 TABLET, ORALLY DISINTEGRATING ORAL DAILY
Refills: 0 | Status: DISCONTINUED | OUTPATIENT
Start: 2020-10-30 | End: 2020-10-31

## 2020-10-30 RX ORDER — SODIUM CHLORIDE 9 MG/ML
1000 INJECTION, SOLUTION INTRAVENOUS ONCE
Refills: 0 | Status: COMPLETED | OUTPATIENT
Start: 2020-10-30 | End: 2020-10-30

## 2020-10-30 RX ORDER — URSODIOL 250 MG/1
1 TABLET, FILM COATED ORAL
Qty: 0 | Refills: 0 | DISCHARGE

## 2020-10-30 RX ORDER — SODIUM CHLORIDE 9 MG/ML
500 INJECTION INTRAMUSCULAR; INTRAVENOUS; SUBCUTANEOUS ONCE
Refills: 0 | Status: COMPLETED | OUTPATIENT
Start: 2020-10-30 | End: 2020-10-30

## 2020-10-30 RX ADMIN — Medication 650 MILLIGRAM(S): at 15:36

## 2020-10-30 RX ADMIN — Medication 650 MILLIGRAM(S): at 19:11

## 2020-10-30 RX ADMIN — SODIUM CHLORIDE 1000 MILLILITER(S): 9 INJECTION, SOLUTION INTRAVENOUS at 15:36

## 2020-10-30 RX ADMIN — Medication 25 MILLIGRAM(S): at 18:18

## 2020-10-30 RX ADMIN — Medication 40 MILLIEQUIVALENT(S): at 10:11

## 2020-10-30 RX ADMIN — CEFEPIME 100 MILLIGRAM(S): 1 INJECTION, POWDER, FOR SOLUTION INTRAMUSCULAR; INTRAVENOUS at 18:18

## 2020-10-30 RX ADMIN — SODIUM CHLORIDE 250 MILLILITER(S): 9 INJECTION, SOLUTION INTRAVENOUS at 07:29

## 2020-10-30 RX ADMIN — SODIUM CHLORIDE 1000 MILLILITER(S): 9 INJECTION INTRAMUSCULAR; INTRAVENOUS; SUBCUTANEOUS at 22:12

## 2020-10-30 RX ADMIN — Medication 40 MILLIEQUIVALENT(S): at 20:08

## 2020-10-30 NOTE — ED PROVIDER NOTE - CLINICAL SUMMARY MEDICAL DECISION MAKING FREE TEXT BOX
Patient presents w/BLE weakness and decreased sensation a/w constipation and difficulty urinating in the context of recent back surgery. Concern for cord compression. Also endorsing mechanical fall today, denies anticoagulation. Labs, CT head/cspine, MRI T/L pending. Dispo per findings.

## 2020-10-30 NOTE — H&P ADULT - NSHPLABSRESULTS_GEN_ALL_CORE
10.9   7.91  )-----------( 336      ( 30 Oct 2020 07:23 )             37.2     10    143  |  102  |  17  ----------------------------<  114<H>  3.1<L>   |  30  |  0.77    Ca    9.7      30 Oct 2020 07:23    TPro  6.8  /  Alb  4.5  /  TBili  < 0.2<L>  /  DBili  x   /  AST  23  /  ALT  26  /  AlkPhos  76  1030    PT/INR - ( 30 Oct 2020 07:23 )   PT: 11.1 SEC;   INR: 0.96          PTT - ( 30 Oct 2020 07:23 )  PTT:31.0 SEC  Urinalysis Basic - ( 30 Oct 2020 15:15 )    Color: YELLOW / Appearance: TURBID / S.019 / pH: 6.5  Gluc: NEGATIVE / Ketone: NEGATIVE  / Bili: NEGATIVE / Urobili: NORMAL   Blood: SMALL / Protein: 70 / Nitrite: NEGATIVE   Leuk Esterase: LARGE / RBC: 3-5 / WBC >50   Sq Epi: x / Non Sq Epi: MANY / Bacteria: MANY        Urine tox - positive for BZD, amphetamine, opiate, PCP.      CT images were reviewed:   Cervical spine CT: No acute fractures or dislocations.    Mild multilevel cervical spondylosis.    1.9 cm hypodense left-sided thyroid nodule. Recommend ultrasound correlation.    Head CT: No acute intracranial hemorrhage, mass effect, or shift of the midline structures.        < from: MR Lumbar Spine No Cont (10.30.20 @ 13:01) >    IMPRESSION: Interval left-sided partial discectomy at the level of L3-L4 with expected post surgical changes. Please see discussion in the body of the report.    Similar-appearing degenerative disc disease at the L3-L4 level with asymmetric right-sided disc space height loss.    No conus medullaris or cauda equina compression.    Mild degenerative disc disease at the T7-T8 level. Otherwise, unremarkable thoracic spine MRI examination.    < end of copied text >          EKG- SR. Artifact noted. There was TWI at the precordial leads. Unable to pull old 2020 EKG from EMR for review.

## 2020-10-30 NOTE — H&P ADULT - NSHPPHYSICALEXAM_GEN_ALL_CORE
Vital Signs Last 24 Hrs  T(C): 36.9 (30 Oct 2020 19:04), Max: 36.9 (30 Oct 2020 06:23)  T(F): 98.4 (30 Oct 2020 19:04), Max: 98.5 (30 Oct 2020 07:37)  HR: 59 (30 Oct 2020 19:04) (59 - 80)  BP: 99/48 (30 Oct 2020 19:04) (96/51 - 132/66)  BP(mean): --  RR: 17 (30 Oct 2020 19:04) (17 - 20)  SpO2: 97% (30 Oct 2020 19:04) (96% - 100%)    Gen- In bed, comfortable, NAD  Eyes- EOMI, PERRLA, nonicteric.  EMNT- Fair dentition. Dry MM. No tonsilar exudates. No posterior pharynx erythema.  Neck- Supple. No masses. No tracheal deviation.  Resp- CTAB, good effort. No r/r/w. No accessory muscle use.  CVS- RRR, S1S2, no g/r/m. No LE edema.  GI- Soft abd, NT, ND, +BSx4. No HSM.  MSK- No C/C. ROM intact. No crepitus.  Neuro- CN II-XII intact. Speech fluent/face symmetric. Sensation intact. Strength 5/5 throughout.  Skin- No rashes/ulcers. Warm/moist.  Psych- AAOx3. Appropriate mood/affect.

## 2020-10-30 NOTE — H&P ADULT - PROBLEM SELECTOR PLAN 1
-No sx of dizziness, weakness. Still c/o of mild tingling on left leg, but minute. Has hx of neuropathy. Etiology of fall unclear. No neuro findings on exam or imaging. She does have hypokalemia which can contribute to weakness.  -Falls precaution.  -Optimize lytes.  -Will check CK and Mag. Added on to labs.  -Re: urine tox - pt adamantly denied drug use. iSTOP checked - [id number]. Confirmed Rx for dilaudid/ativan. Will repeat urine tox, possible lab error?

## 2020-10-30 NOTE — ED PROVIDER NOTE - ATTENDING CONTRIBUTION TO CARE
HPI: 60 yo F with sjogrens, PBCirrhosis, Hypothyroidism and chronic back pain that is s/p L3/4 back surgery 4 wks ago that presents with bilateral leg weakness that led to a fall this AM when trying to walk. Pt reports started having diff urinating (retention) last few days, then had BLWE weakness yday but this morning tried to get out of bed, fell due to weakness, hit right front head on dresser, unsure LOC, now with nausea without vomiting, denies vision/hearing changes. No chest pain, palpitations but has some room spinning dizziness which is reminiscent of her usual vertigo but never had BLE weakness associated with this condition. Denies recent fever, chills, cough, Covid symptoms. no dysuria or hematuria. Is on chronic steroids for her back surgery and dilaudid as well.   EXAM: Head tenderness to palpation right forehead no open wounds, eyes EOMI/PERRL, neck supple but tenderness to palpation C7 without palpable stepoffs, No T/L spine tenderness to palpation but well healing scar left L4 region paraspinal, no signs of infection such as surrounding erythema, no pus drainage no fluctuance or crepitus, CN 2-12 intact. Chest wall stable, abd soft nontender, pelvis stable, passive ROM intact but 2/5 LLE strength, 3/5 RLE strength, sensation decreased to light touch L>R, dorsi/plantarflexion weak, DECREASED RECTAL TONE and L>R perianal sensation decreased to light touch.   MDM: pt with multiple medical problems that presents with BLE weakness and vertigo like symptoms. possible related but more likely BLE weakness from recent L3/4 surgery by [facility] 3-4 wks ago leading to head trauma leading to vertigo. Pt has decreased sensation and rectal tone and reporting gradually increasing urinary retention. Will require emergent MRI (rads called for approval), Will also CT head and neck as well as obtain preop labs and EKG. Will most likely admit.

## 2020-10-30 NOTE — ED ADULT TRIAGE NOTE - CHIEF COMPLAINT QUOTE
Pt st" I got up this morning around to get glass of water and my legs gave out on me.....I have vertigo which in past would get bad and I would have to hold on to walls to walk but never did my legs give out on me. I am just alittle dizzy now ....I hit head on dresser, I have tingling in both calfs but no numbness, I resently had surgery for hernia repair 4 weeks ago" As per EMT, " She was able to get herself up onto bed unable to walk." Denies blood thinners, denies LOC, speech is clear no facial droop, hand grasp = strength, no drift, lower ext =strength, no drift, denies numbness,  HX of Chobans, thyroid dx , high cholesterol Pt st" I got up this morning around to get glass of water and my legs gave out on me.....I have vertigo which in past would get bad and I would have to hold on to walls to walk but never did my legs give out on me. I am just alittle dizzy now ....I hit head on dresser, I have tingling in both calfs but no numbness, I resently had surgery for hernia repair 4 weeks ago" As per EMT, " She was able to get herself up onto bed unable to walk." Denies blood thinners, denies LOC, speech is clear no facial droop, hand grasp = strength, no drift, lower ext =strength, no drift,  Presently denies Parathesia& numbness, I have peripheral neuropathy right now " just tingling in both my calfs." + pedial pulses, HX of CHilblains ?, thyroid dx , high cholesterol Pt st" I got up this morning  to get glass of water and my legs gave out on me.....I have vertigo which in past would get bad and I would have to hold on to walls to walk but never did my legs give out on me. I am just alittle dizzy now ....I hit head on dresser, I have tingling in both calfs but no numbness, I resently had surgery for hernia repair 4 weeks ago" As per EMT, " She was able to get herself up onto bed unable to walk." Denies blood thinners, denies LOC, speech is clear no facial droop, hand grasp = strength, no drift, lower ext =strength, no drift,  Presently denies Parathesia& numbness, I have peripheral neuropathy right now " just tingling in both my calfs." + pedial pulses, HX of CHilblains ?, thyroid dx , high cholesterol Pt st" I got up this morning  to get glass of water and my legs gave out on me.....I have vertigo which in past would get bad and I would have to hold on to walls to walk but never did my legs give out on me. I am just alittle dizzy now ....I hit head on dresser, I have tingling in both calfs but no numbness, I recently had  spinal surgery for herniated disc  4 weeks ago" As per EMT, " She was able to get herself up onto bed unable to walk." Denies blood thinners, denies LOC, speech is clear no facial droop, hand grasp = strength, no drift, lower ext =strength, no drift,  Presently denies Parathesia& numbness, I have peripheral neuropathy right now " just tingling in both my calfs." + pedial pulses, HX of CHilblains ?, thyroid dx , high cholesterol

## 2020-10-30 NOTE — CONSULT NOTE ADULT - SUBJECTIVE AND OBJECTIVE BOX
Patient is a 59y Female who underwent lateral discectomy at L3-4 3 weeks ago by Dr. Dubon, presents c/o difficulty w/ ambulation. Denies any falls/HS/LOC. Denies pain/injury elsewhere. Denies numbness/tingling/paresthesias/weakness. Denies bowel/bladder incontinence. Denies fevers/chills. No other complaints at this time.    Patient states that she feels lightheaded / dizzy when she stands up, making it difficult for her to ambulate.  She denies any weakness/ numbness/ tingling in her LEs.     Was recently seen by Dr. Dubon in clinic, who noted she was progressing well postop.     HEALTH ISSUES - PROBLEM Dx:    MEDICATIONS  (STANDING):    Allergies    aspirin (Anaphylaxis)  ibuprofen (Unknown)  Levaquin (Anaphylaxis)  mirtazapine (Angioedema; Rash)  penicillin (Unknown)    Intolerances      PAST MEDICAL & SURGICAL HISTORY:  Autoimmune skin disease    Neuropathy    Hypothyroid    Sjogren&#x27;s syndrome    Lupus    Primary biliary cirrhosis    Asthma    S/P appendectomy    S/P knee surgery    S/P tonsillectomy    S/P cholecystectomy                              10.9   7.91  )-----------( 336      ( 30 Oct 2020 07:23 )             37.2       30 Oct 2020 07:23    143    |  102    |  17     ----------------------------<  114    3.1     |  30     |  0.77     Ca    9.7        30 Oct 2020 07:23    TPro  6.8    /  Alb  4.5    /  TBili  < 0.2  /  DBili  x      /  AST  23     /  ALT  26     /  AlkPhos  76     30 Oct 2020 07:23      PT/INR - ( 30 Oct 2020 07:23 )   PT: 11.1 SEC;   INR: 0.96          PTT - ( 30 Oct 2020 07:23 )  PTT:31.0 SEC    Urinalysis Basic - ( 30 Oct 2020 15:15 )    Color: YELLOW / Appearance: TURBID / S.019 / pH: 6.5  Gluc: NEGATIVE / Ketone: NEGATIVE  / Bili: NEGATIVE / Urobili: NORMAL   Blood: SMALL / Protein: 70 / Nitrite: NEGATIVE   Leuk Esterase: LARGE / RBC: 3-5 / WBC >50   Sq Epi: x / Non Sq Epi: MANY / Bacteria: MANY        Vital Signs Last 24 Hrs  T(C): 36.8 (10-30-20 @ 15:37), Max: 36.9 (10-30-20 @ 06:23)  T(F): 98.3 (10-30-20 @ 15:37), Max: 98.5 (10-30-20 @ 07:37)  HR: 63 (10-30-20 @ 15:37) (61 - 80)  BP: 99/46 (10-30-20 @ 15:37) (96/51 - 132/66)  BP(mean): --  RR: 17 (10-30-20 @ 15:37) (17 - 20)  SpO2: 97% (10-30-20 @ 15:37) (96% - 100%)    Gen: NAD    Spine PE:  Incision well healing  No gross deformity  No midline TTP C/T/L/S spine  No bony step offs  No paraspinal muscle ttp/hypertonicity   Negative clonus  Negative babinski  Negative nieves  No saddle anesthesia    Motor:                   C5                C6              C7               C8           T1   R            5/5                5/5            5/5             5/5          5/5  L             5/5               5/5             5/5             5/5          5/5                L2             L3             L4               L5            S1  R         5/5           5/5          5/5             5/5           5/5  L          5/5          5/5           5/5             5/5           5/5    Sensory:            C5         C6         C7      C8       T1        (0=absent, 1=impaired, 2=normal, NT=not testable)  R         2            2           2        2         2  L          2            2           2        2         2               L2          L3         L4      L5       S1         (0=absent, 1=impaired, 2=normal, NT=not testable)  R         2            2            2        2        2  L          2            2           2        2         2    Imaging:   MRI read as no cord compression    A/P: 59y Female recent Lat discectomy at L3/4, progressing well postop  Pain control  WBAT with assistive devices as needed  SCDs  Case discussed w/ Dr. Dubon, who is in agreement that patient is unlikely to have cord compression  Patient can follow up w/ Dr. Dubon as outpatient   Ortho to s/o  Please reconsult w/ any new orthopaedic concerns

## 2020-10-30 NOTE — H&P ADULT - NSHPREVIEWOFSYSTEMS_GEN_ALL_CORE
ROS:    Constitutional: [ ] fevers [ ] chills [ ] weight loss [ ] weight gain  HEENT: [ ] dry eyes [ ] eye irritation [ ] postnasal drip [ ] nasal congestion  CV: [ ] chest pain [ ] orthopnea [ ] palpitations [ ] murmur  Resp: [ ] cough [ ] shortness of breath [ ] dyspnea [ ] wheezing [ ] sputum [ ] hemoptysis  GI: [ ] nausea [ ] vomiting [ ] diarrhea [ ] constipation [ ] abd pain [ ] dysphagia   : [ ] dysuria [ ] nocturia [ ] hematuria [ ] increased urinary frequency  Musculoskeletal: [ ] back pain [ ] myalgias [ ] arthralgias [ ] fracture  Skin: [ ] rash [ ] itch  Neurological: [ ] headache [x ] dizziness [ ] syncope [x ] weakness [ x] numbness L>R at LE.  Psychiatric: [ ] anxiety [ ] depression  Endocrine: [ ] diabetes [ ] thyroid problem  Hematologic/Lymphatic: [ ] anemia [ ] bleeding problem  Allergic/Immunologic: [ ] itchy eyes [ ] nasal discharge [ ] hives [ ] angioedema  [ x] All other systems negative  [ ] Unable to assess ROS because ________

## 2020-10-30 NOTE — H&P ADULT - HISTORY OF PRESENT ILLNESS
This is a 59F with PMH of RA, asthma, Sjogren's syndrome, primary biliary cirrhosis, Chilblain's, hypothyroidism who presents s/p fall at home. Pt states that she woke up this morning and was getting out of bed when her legs suddenly gave out. She fell down and hit her head against night stand. Pt denied LOC. She denied any prodromal symptoms of blurry vision or dizziness prior to fall. She has been reporting having weakness over the past month, but have not fallen until this AM. She reported 1 episode of urinary difficulty prior to arrival, but has since resolved. She voided w/o issue in the ED. She denied any significant numbness/tingling. She thinks her left leg sensations is minimally diminished compared to the right. She denied any issues with BM, last episode was last night. She denied any saddle anesthesia. Of note, she was admitted to [facility] this past month and was reportedly being treated for "fungal infection" of the esophagus. Otherwise, no recent travels, sick contacts, fevers,chills, NV, SOB, CP, abd pain.    ED Course: Got LR 2L, cefepime, Kcl 40 x1. Ortho c/s. Had MRI done.

## 2020-10-30 NOTE — ED PROVIDER NOTE - OBJECTIVE STATEMENT
60 yo F p/w BLE weakness s/p mechanical fall this morning. Patient states she had discectomy ~3 weeks ago with . Patient endorses constipation, difficulty urinating, and decreased sensation (left > right) of the lower extremities. Denies fever/chills, saddle anesthesia, n/v/d, cp, sob, abdominal pain, or further symptoms.

## 2020-10-30 NOTE — ED PROVIDER NOTE - PMH
Asthma    Autoimmune skin disease    Hypothyroid    Neuropathy    Primary biliary cirrhosis    Sjogren's syndrome     English

## 2020-10-30 NOTE — H&P ADULT - ASSESSMENT
iSTOP ref [id number]  Latest Rx from 10/21/2020- Di This is a 59F with PMH of RA, asthma, Sjogren's syndrome, primary biliary cirrhosis, Chilblain's, hypothyroidism who presents s/p fall at home. Initially there was concern for cord compression. Fortunately, MRI of T and L spine showed no acute pathologies to suggest cord comp. Pt was seen by ortho as well, exam was benign. No further interventions per recs. The patient was admitted for further evaluation s/p fall at home.

## 2020-10-30 NOTE — H&P ADULT - PROBLEM SELECTOR PLAN 5
Cont usual home meds. -Hold home cardizem for now. BP on softer side compared to baseline.  -Check orthostatics.   -Resume BP meds as appropriate.

## 2020-10-30 NOTE — H&P ADULT - PROBLEM SELECTOR PLAN 3
-Received Kcl 40 already. Will give additional KCl 40 meq. F/u Mag as well.  --Had some TWI, which could be related to low K. Repeat EKG in AM. She has no CP or other acute symptoms.

## 2020-10-30 NOTE — ED PROVIDER NOTE - PROGRESS NOTE DETAILS
Attempted to ambulate patient, unable to take even a few steps without significant assistance. Contacted Dr. Dubon and conveyed physical exam and results, he requested Orthopedic team to evaluate. Contacted orthopedics who saw patient at bedside, nothing further to add. Discussed case with Dr. Melara, including lab results he requested earlier. Agreed to admit for inability to ambulate.

## 2020-10-30 NOTE — H&P ADULT - NSHPSOCIALHISTORY_GEN_ALL_CORE
Denied any tobacco, ETOH, or drug use.  Lives alone.   Has privately paid aide. Usually ambulatory w/o assistive device.

## 2020-10-30 NOTE — ED ADULT NURSE NOTE - ALCOHOL PRE SCREEN (AUDIT - C)
What Type Of Note Output Would You Prefer (Optional)?: Standard Output Hpi Title: Evaluation of Skin Lesions How Severe Are Your Spot(S)?: mild Have Your Spot(S) Been Treated In The Past?: has not been treated Statement Selected

## 2020-10-30 NOTE — ED ADULT NURSE NOTE - OBJECTIVE STATEMENT
Pt received in rom 18. Pt A&Ox4, stating she fell this morning when she got out of bed. Pt states her legs became weak after getting out of bed and she fell and hit her head. Pt denies any blood thinner use, LOC and states she has a mild headache (1/10). Pt states she has had difficulty urinating starting yesterday and constipation.  Pt denies any chest pain, SOB, N/V/D, Abd pain. Pt's abdomen soft, nontender, nondistended, respirations even an unlabored. Pt in normal sinus rhythm on cardiac monitor. Will continue to monitor. Pt received in rom 18. Pt A&Ox4, stating she fell this morning when she got out of bed. Pt states her legs became weak after getting out of bed and she fell and hit her head. Pt denies any blood thinner use, LOC and states she has a mild headache (1/10). Pt states she has had difficulty urinating starting yesterday and constipation. Pt states she had decreased sensation in lower extremities on left side. Pt states she had spinal surgery 3 weeks ago, pt currently has a healing incision on lower back, no redness or discharge observed. Pt denies any chest pain, SOB, N/V/D, Abd pain. No facial droop or arm drift noted, speech is clear, no arm drift. Pt's abdomen soft, nontender, nondistended, respirations even an unlabored. Pt in normal sinus rhythm on cardiac monitor. Will continue to monitor. Pt received in rom 18. Pt A&Ox4, stating she fell this morning when she got out of bed. Pt states her legs became weak after getting out of bed and she fell and hit her head. Pt denies any blood thinner use, LOC and states she has a mild headache (1/10). Pt states she has had difficulty urinating starting yesterday and constipation. Pt states she had decreased sensation in lower extremities on left side and currently feels like the room is spinning at times. Pt states she had spinal surgery 3 weeks ago, pt currently has a healing incision on lower back, no redness or discharge observed. Pt denies any chest pain, SOB, N/V/D, Abd pain. No facial droop or arm drift noted, speech is clear. Pt's abdomen soft, nontender, nondistended, respirations even an unlabored. Pt in normal sinus rhythm on cardiac monitor. Will continue to monitor.

## 2020-10-30 NOTE — H&P ADULT - PROBLEM SELECTOR PLAN 2
-Had recent lateral discectomy at L3-4 (3 wks ago). Seen by ortho. No interventions. MRI showed expected post-op changes. Exam benign as well.  -PT eval

## 2020-10-30 NOTE — ED ADULT NURSE NOTE - CHIEF COMPLAINT QUOTE
Pt st" I got up this morning  to get glass of water and my legs gave out on me.....I have vertigo which in past would get bad and I would have to hold on to walls to walk but never did my legs give out on me. I am just alittle dizzy now ....I hit head on dresser, I have tingling in both calfs but no numbness, I recently had  spinal surgery for herniated disc  4 weeks ago" As per EMT, " She was able to get herself up onto bed unable to walk." Denies blood thinners, denies LOC, speech is clear no facial droop, hand grasp = strength, no drift, lower ext =strength, no drift,  Presently denies Parathesia& numbness, I have peripheral neuropathy right now " just tingling in both my calfs." + pedial pulses, HX of CHilblains ?, thyroid dx , high cholesterol

## 2020-10-31 ENCOUNTER — TRANSCRIPTION ENCOUNTER (OUTPATIENT)
Age: 60
End: 2020-10-31

## 2020-10-31 VITALS
HEART RATE: 83 BPM | SYSTOLIC BLOOD PRESSURE: 127 MMHG | RESPIRATION RATE: 18 BRPM | DIASTOLIC BLOOD PRESSURE: 71 MMHG | TEMPERATURE: 98 F | OXYGEN SATURATION: 99 %

## 2020-10-31 LAB
ALBUMIN SERPL ELPH-MCNC: 3.7 G/DL — SIGNIFICANT CHANGE UP (ref 3.3–5)
ALP SERPL-CCNC: 67 U/L — SIGNIFICANT CHANGE UP (ref 40–120)
ALT FLD-CCNC: 20 U/L — SIGNIFICANT CHANGE UP (ref 4–33)
AMPHET UR-MCNC: NEGATIVE — SIGNIFICANT CHANGE UP
ANION GAP SERPL CALC-SCNC: 8 MMO/L — SIGNIFICANT CHANGE UP (ref 7–14)
AST SERPL-CCNC: 17 U/L — SIGNIFICANT CHANGE UP (ref 4–32)
BARBITURATES UR SCN-MCNC: NEGATIVE — SIGNIFICANT CHANGE UP
BASOPHILS # BLD AUTO: 0.04 K/UL — SIGNIFICANT CHANGE UP (ref 0–0.2)
BASOPHILS NFR BLD AUTO: 0.7 % — SIGNIFICANT CHANGE UP (ref 0–2)
BENZODIAZ UR-MCNC: NEGATIVE — SIGNIFICANT CHANGE UP
BILIRUB SERPL-MCNC: < 0.2 MG/DL — LOW (ref 0.2–1.2)
BUN SERPL-MCNC: 8 MG/DL — SIGNIFICANT CHANGE UP (ref 7–23)
CALCIUM SERPL-MCNC: 8.8 MG/DL — SIGNIFICANT CHANGE UP (ref 8.4–10.5)
CANNABINOIDS UR-MCNC: NEGATIVE — SIGNIFICANT CHANGE UP
CHLORIDE SERPL-SCNC: 105 MMOL/L — SIGNIFICANT CHANGE UP (ref 98–107)
CO2 SERPL-SCNC: 25 MMOL/L — SIGNIFICANT CHANGE UP (ref 22–31)
COCAINE METAB.OTHER UR-MCNC: NEGATIVE — SIGNIFICANT CHANGE UP
CREAT SERPL-MCNC: 0.66 MG/DL — SIGNIFICANT CHANGE UP (ref 0.5–1.3)
EOSINOPHIL # BLD AUTO: 0.3 K/UL — SIGNIFICANT CHANGE UP (ref 0–0.5)
EOSINOPHIL NFR BLD AUTO: 5 % — SIGNIFICANT CHANGE UP (ref 0–6)
GLUCOSE SERPL-MCNC: 93 MG/DL — SIGNIFICANT CHANGE UP (ref 70–99)
HCT VFR BLD CALC: 32.3 % — LOW (ref 34.5–45)
HGB BLD-MCNC: 10 G/DL — LOW (ref 11.5–15.5)
IMM GRANULOCYTES NFR BLD AUTO: 0.5 % — SIGNIFICANT CHANGE UP (ref 0–1.5)
LYMPHOCYTES # BLD AUTO: 1.13 K/UL — SIGNIFICANT CHANGE UP (ref 1–3.3)
LYMPHOCYTES # BLD AUTO: 18.7 % — SIGNIFICANT CHANGE UP (ref 13–44)
MCHC RBC-ENTMCNC: 25.3 PG — LOW (ref 27–34)
MCHC RBC-ENTMCNC: 31 % — LOW (ref 32–36)
MCV RBC AUTO: 81.8 FL — SIGNIFICANT CHANGE UP (ref 80–100)
METHADONE UR-MCNC: NEGATIVE — SIGNIFICANT CHANGE UP
MONOCYTES # BLD AUTO: 0.53 K/UL — SIGNIFICANT CHANGE UP (ref 0–0.9)
MONOCYTES NFR BLD AUTO: 8.8 % — SIGNIFICANT CHANGE UP (ref 2–14)
NEUTROPHILS # BLD AUTO: 4 K/UL — SIGNIFICANT CHANGE UP (ref 1.8–7.4)
NEUTROPHILS NFR BLD AUTO: 66.3 % — SIGNIFICANT CHANGE UP (ref 43–77)
NRBC # FLD: 0 K/UL — SIGNIFICANT CHANGE UP (ref 0–0)
OPIATES UR-MCNC: NEGATIVE — SIGNIFICANT CHANGE UP
OXYCODONE UR-MCNC: NEGATIVE — SIGNIFICANT CHANGE UP
PCP UR-MCNC: NEGATIVE — SIGNIFICANT CHANGE UP
PLATELET # BLD AUTO: 271 K/UL — SIGNIFICANT CHANGE UP (ref 150–400)
PMV BLD: 10.3 FL — SIGNIFICANT CHANGE UP (ref 7–13)
POTASSIUM SERPL-MCNC: 3.9 MMOL/L — SIGNIFICANT CHANGE UP (ref 3.5–5.3)
POTASSIUM SERPL-SCNC: 3.9 MMOL/L — SIGNIFICANT CHANGE UP (ref 3.5–5.3)
PROT SERPL-MCNC: 5.8 G/DL — LOW (ref 6–8.3)
RBC # BLD: 3.95 M/UL — SIGNIFICANT CHANGE UP (ref 3.8–5.2)
RBC # FLD: 14.4 % — SIGNIFICANT CHANGE UP (ref 10.3–14.5)
SODIUM SERPL-SCNC: 138 MMOL/L — SIGNIFICANT CHANGE UP (ref 135–145)
WBC # BLD: 6.03 K/UL — SIGNIFICANT CHANGE UP (ref 3.8–10.5)
WBC # FLD AUTO: 6.03 K/UL — SIGNIFICANT CHANGE UP (ref 3.8–10.5)

## 2020-10-31 RX ORDER — LAMOTRIGINE 25 MG/1
250 TABLET, ORALLY DISINTEGRATING ORAL
Qty: 0 | Refills: 0 | DISCHARGE

## 2020-10-31 RX ORDER — HYDROMORPHONE HYDROCHLORIDE 2 MG/ML
4 INJECTION INTRAMUSCULAR; INTRAVENOUS; SUBCUTANEOUS ONCE
Refills: 0 | Status: DISCONTINUED | OUTPATIENT
Start: 2020-10-31 | End: 2020-10-31

## 2020-10-31 RX ORDER — LAMOTRIGINE 25 MG/1
225 TABLET, ORALLY DISINTEGRATING ORAL
Qty: 0 | Refills: 0 | DISCHARGE

## 2020-10-31 RX ADMIN — Medication 1 TABLET(S): at 12:52

## 2020-10-31 RX ADMIN — HYDROMORPHONE HYDROCHLORIDE 4 MILLIGRAM(S): 2 INJECTION INTRAMUSCULAR; INTRAVENOUS; SUBCUTANEOUS at 08:01

## 2020-10-31 RX ADMIN — URSODIOL 300 MILLIGRAM(S): 250 TABLET, FILM COATED ORAL at 05:49

## 2020-10-31 RX ADMIN — Medication 75 MICROGRAM(S): at 05:49

## 2020-10-31 RX ADMIN — Medication 200 MILLIGRAM(S): at 05:49

## 2020-10-31 RX ADMIN — HYDROMORPHONE HYDROCHLORIDE 4 MILLIGRAM(S): 2 INJECTION INTRAMUSCULAR; INTRAVENOUS; SUBCUTANEOUS at 08:34

## 2020-10-31 NOTE — DISCHARGE NOTE PROVIDER - NSDCMRMEDTOKEN_GEN_ALL_CORE_FT
Cardizem  mg/24 hours oral tablet, extended release: 1 tab(s) orally once a day  HYDROmorphone 4 mg oral tablet: 1 tab(s) orally every 4 hours, As needed, Moderate Pain (4 - 6)  LaMICtal: 250 milligram(s) orally once a day  levothyroxine 75 mcg (0.075 mg) oral tablet: 1 tab(s) orally once a day  Plaquenil 200 mg oral tablet: 1 tab(s) orally 2 times a day  ursodiol 300 mg oral capsule: 1 cap(s) orally 2 times a day   Cardizem  mg/24 hours oral tablet, extended release: 1 tab(s) orally once a day  HYDROmorphone 4 mg oral tablet: 1 tab(s) orally every 4 hours, As needed, Moderate Pain (4 - 6)  LaMICtal 200 mg oral tablet: 225 milligram(s) orally once a day  levothyroxine 75 mcg (0.075 mg) oral tablet: 1 tab(s) orally once a day  Plaquenil 200 mg oral tablet: 1 tab(s) orally 2 times a day  sulfamethoxazole-trimethoprim 800 mg-160 mg oral tablet: 1 tab(s) orally 2 times a day  ursodiol 300 mg oral capsule: 1 cap(s) orally 2 times a day

## 2020-10-31 NOTE — DISCHARGE NOTE PROVIDER - HOSPITAL COURSE
59 F PMHx RA, Sjogren's, asthma, PBC, Chiblain's, hypothyroidism presents S/P fall.  Pt states that she woke up this morning and was getting out of bed when her legs suddenly gave out. She fell down and hit her head against night stand. Denies prodromal symptoms. Reports weakness over past month.     CTH/ C-spine negative for acute changes. MRI head negative. TSH WNL. CK 29. Orthostatics negative.     Spoke with attending, Dr. Gomez, pt is medically stable for discharge to ____________________________ 59 F PMHx RA, Sjogren's, asthma, PBC, Chiblain's, hypothyroidism presents S/P fall.  Pt states that she woke up this morning and was getting out of bed when her legs suddenly gave out. She fell down and hit her head against night stand. Denies prodromal symptoms. Reports weakness over past month.     CTH/ C-spine negative for acute changes. MRI head negative. TSH WNL. CK 29. Orthostatics negative.     Spoke with attending, Dr. Gomez 59 F PMHx RA, Sjogren's, asthma, PBC, Chiblain's, hypothyroidism presents S/P fall.  Pt states that she woke up this morning and was getting out of bed when her legs suddenly gave out. She fell down and hit her head against night stand. Denies prodromal symptoms. Reports weakness over past month.     CTH/ C-spine negative for acute changes. MRI head negative. TSH WNL. CK 29. Orthostatics negative.     UA positive. Continue Bactrim x5 days    Spoke with attending, Dr. Gomez, pt is medically stable for discharge to home

## 2020-10-31 NOTE — DISCHARGE NOTE PROVIDER - CARE PROVIDER_API CALL
Dr. Thom Sheets,   Phone: (   )    -  Fax: (   )    -  Follow Up Time:     James Dubon  ORTHOPAEDIC SURGERY  [address] [place] [address], [address]  [place] [address]  Phone: [phone]  Fax: [phone]  Follow Up Time:

## 2020-10-31 NOTE — PHYSICAL THERAPY INITIAL EVALUATION ADULT - PLANNED THERAPY INTERVENTIONS, PT EVAL
Patient left supine in bed in NAD, call bell in reach, all lines intact. JAGDEEP Villavicencio aware

## 2020-10-31 NOTE — PROGRESS NOTE ADULT - SUBJECTIVE AND OBJECTIVE BOX
Patient is a 59y old  Female who presents with a chief complaint of Fall (31 Oct 2020 08:08)      INTERVAL HPI/OVERNIGHT EVENTS:  see h and p    MEDICATIONS  (STANDING):  enoxaparin Injectable 40 milliGRAM(s) SubCutaneous daily  hydroxychloroquine 200 milliGRAM(s) Oral two times a day  lamoTRIgine 250 milliGRAM(s) Oral daily  levothyroxine 75 MICROGram(s) Oral daily  ursodiol Capsule 300 milliGRAM(s) Oral two times a day    MEDICATIONS  (PRN):        Orders last 24 hours:  Add On Test-Specimen in Lab: STAT, - HST  Specimen In Lab. Enter name of test required in Special Instructions.  Cancel Reason: PLEASE REORDER CORRECT ADD-ON TEST. NO TEST KNOWN AS "HST".  This is for informational purposes only and will not receive results. Laboratory staff will enter new orders for the requested add on test and that order will receive results. (10-31-20 @ 09:28)  HYDROmorphone   Tablet: [Ordered as DILAUDID]  4 milliGRAM(s), Oral, once, Stop After 1 Doses  Administration Instructions: This is a Look-alike/Sound-alike Medication  Provider's Contact #: [phone] (10-31-20 @ 07:45)  Toxicology Screen, Drugs of Abuse, Urine: STAT (10-31-20 @ 06:04)  acetaminophen   Tablet ..: [Ordered as TYLENOL..]  650 milliGRAM(s), Oral, once, Stop After 1 Doses  Administration Instructions: MAX DAILY DOSE:  ADULT = 4,000 mG/Day  This is a Look-alike/Sound-alike Medication  Provider's Contact #: [phone] (10-30-20 @ 21:36)  sodium chloride 0.9% Bolus:   500 milliLiter(s), IV Bolus, once, infuse over 30 Minute(s), Stop After 1 Doses  Provider's Contact #: [phone] (10-30-20 @ 21:36)  CK Total and CKMB (10-30-20 @ 20:11)  ([facility]):   Qty Removed: 2 each (10-30-20 @ 20:07)  12 Lead ECG (10-30-20 @ 20:02)  potassium chloride    Tablet ER:   40 milliEquivalent(s), Oral, once, Stop After 1 Doses  Administration Instructions: Swallow whole * don't crush/chew (10-30-20 @ 19:40)  Magnesium, Serum (10-30-20 @ 19:37)  Creatine Kinase, Serum (10-30-20 @ 19:37)  Add On Test-Specimen in Lab: STAT, - magnesium, CPK  Specimen In Lab. Enter name of test required in Special Instructions.  This is for informational purposes only and will not receive results. Laboratory staff will enter new orders for the requested add on test and that order will receive results. (10-30-20 @ 19:35)  potassium chloride    Tablet ER:   40 milliEquivalent(s), Oral, every 4 hours, Stop After 2 Doses  Administration Instructions: Swallow whole * don't crush/chew (10-30-20 @ 19:34)  Toxicology Screen, Drugs of Abuse, Urine: Routine (10-30-20 @ 19:34)  levothyroxine: [Known as SYNTHROID]  75 MICROGram(s), Oral, daily  Special Instructions: Administer on an empty stomach at least 1 hour before food (10-30-20 @ 19:33)  ursodiol Capsule: [Known as ACTIGALL]  300 milliGRAM(s), Oral, two times a day  Indication: PBC (10-30-20 @ 19:33)  hydroxychloroquine: [Known as PLAQUENIL]  200 milliGRAM(s), Oral, two times a day  Indication: RA (10-30-20 @ 19:33)  lamoTRIgine: [Ordered as LaMICtal]  250 milliGRAM(s), Oral, daily  Administration Instructions: This is a Look-alike/Sound-alike Medication (10-30-20 @ 19:33)  diltiazem   CD: [Known as carDIZEM CD]  180 milliGRAM(s), Oral, daily  Special Instructions: hold if SBP<90, HR<60  Administration Instructions: Swallow whole * don't crush/chew  This is a Look-alike/Sound-alike Medication (10-30-20 @ 19:33)  enoxaparin Injectable: [Ordered as LOVENOX]  40 milliGRAM(s), SubCutaneous, daily (10-30-20 @ 19:29)  Complete Blood Count + Automated Diff: AM Sched. Collection: 31-Oct-2020 06:00 (10-30-20 @ 19:29)  Comprehensive Metabolic Panel: AM Sched. Collection: 31-Oct-2020 06:00 (10-30-20 @ 19:29)  Diet, Regular (10-30-20 @ 19:29)  COVID-19  Antibody - for prior infection screening: Routine (10-30-20 @ 18:52)  ([facility]):   Qty Removed: 1 each (10-30-20 @ 18:12)  ([facility]):   Qty Removed: 1 each (10-30-20 @ 18:11)  meclizine: [Known as ANTIVERT]  25 milliGRAM(s), Oral, once, Stop After 1 Doses (10-30-20 @ 17:26)  cefepime   IVPB: [Known as MAXIPIME]  1000 milliGRAM(s) in dextrose 5% 50 milliLiter(s), IV Intermittent, once, infuse over 30 Minute(s), Stop After 1 Doses  Indication: uti (10-30-20 @ 17:26)  ([facility]):   Qty Removed: 1 each (10-30-20 @ 15:30)  ([facility]):   Qty Removed: 2 each (10-30-20 @ 15:30)  lactated ringers Bolus:   1000 milliLiter(s), IV Bolus, once, infuse over 60 Minute(s), Stop After 1 Doses  Provider's Contact #: [phone] (10-30-20 @ 15:09)  morphine  - Injectable:   4 milliGRAM(s), IV Push, Once, Stop After 1 Doses  Administration Instructions: This is a Look-alike/Sound-alike Medication  Provider's Contact #: [phone] (10-30-20 @ 14:57)  acetaminophen   Tablet ..: [Known as TYLENOL..]  650 milliGRAM(s), Oral, once, Stop After 1 Doses  Administration Instructions: MAX DAILY DOSE:  ADULT = 4,000 mG/Day  This is a Look-alike/Sound-alike Medication (10-30-20 @ 14:57)      Allergies    aspirin (Anaphylaxis)  ibuprofen (Unknown)  Levaquin (Anaphylaxis)  mirtazapine (Angioedema; Rash)  penicillin (Unknown)    Intolerances    Neurontin (Other)      REVIEW OF SYSTEMS:  CARDIOVASCULAR: No chest pain, palpitations, dizziness, or leg swelling; no shortness of breath     RESPIRATORY: No cough, wheezing, chills or hemoptysis; No shortness of breath    GASTROINTESTINAL: No abdominal or epigastric pain. No nausea, vomiting, or hematemesis; No diarrhea or constipation. No melena or hematochezia.    NEUROLOGICAL: No headaches, memory loss, loss of strength, numbness      PHYSICAL EXAM:  Vital Signs Last 24 Hrs  T(C): 36.4 (31 Oct 2020 05:46), Max: 36.9 (30 Oct 2020 12:28)  T(F): 97.5 (31 Oct 2020 05:46), Max: 98.4 (30 Oct 2020 12:28)  HR: 60 (31 Oct 2020 05:46) (59 - 63)  BP: 107/58 (31 Oct 2020 05:46) (96/51 - 107/58)  BP(mean): --  RR: 19 (31 Oct 2020 05:46) (17 - 19)  SpO2: 98% (31 Oct 2020 05:46) (96% - 98%)    GENERAL: NAD, well-groomed, well-developed  HEAD:  Atraumatic, Normocephalic  EYES: EOMI, PERRLA, conjunctiva and sclera clear  NECK: Supple, No JVD, Normal thyroid  NERVOUS SYSTEM:  Alert & Oriented X3, Good concentration;  and symmetric  CHEST/LUNG: Clear to auscultation bilaterally; No rales, rhonchi, wheezing, or rubs  HEART: S1S2 regular, without murmur, rub nor gallop  ABDOMEN: Soft, Nontender, Nondistended; Bowel sounds present  EXTREMITIES:  2+ Peripheral Pulses, No clubbing, cyanosis, or edema  normal gait      LABS:                        10.0   6.03  )-----------( 271      ( 31 Oct 2020 06:37 )             32.3     31 Oct 2020 06:37    138    |  105    |  8      ----------------------------<  93     3.9     |  25     |  0.66     Ca    8.8        31 Oct 2020 06:37    TPro  5.8    /  Alb  3.7    /  TBili  < 0.2  /  DBili  x      /  AST  17     /  ALT  20     /  AlkPhos  67     31 Oct 2020 06:37    PT/INR - ( 30 Oct 2020 07:23 )   PT: 11.1 SEC;   INR: 0.96          PTT - ( 30 Oct 2020 07:23 )  PTT:31.0 SEC  CAPILLARY BLOOD GLUCOSE        Assessment and Plan:   Assessment:  · Assessment      This is a 59F with PMH of RA, asthma, Sjogren's syndrome, primary biliary cirrhosis, Chilblain's, hypothyroidism who presents s/p fall at home. Initially there was concern for cord compression. Fortunately, MRI of T and L spine showed no acute pathologies to suggest cord comp. Pt was seen by ortho as well, exam was benign. No further interventions per recs. The patient was admitted for further evaluation s/p fall at home.         Problem/Plan - 1:  ·  Problem: Fall.  Plan: -No sx of dizziness, weakness. Still c/o of mild tingling on left leg, but minute. Has hx of neuropathy. Etiology of fall unclear. No neuro findings on exam or imaging. She had hypokalemia as well as bacturea and positive urine toxilogy screen .    -Re: urine tox - pt adamantly denied drug use. iSTOP checked - [id number]. Confirmed Rx for dilaudid/ativan. Discussed at length with patient, psychiatrist and health care proxy.   No further prescriptions to be written; pt advised to seek inpatient detox.  Inpatient psychiatry consultation requested, but they felt not needed as patient is not actively suicidal.     Problem/Plan - 2:  ·  Problem: Lumbar and other intervertebral disc disorders with radiculopathy.  Plan: -Had recent lateral discectomy at L3-4 (3 wks ago). Seen by ortho. No interventions. MRI showed expected post-op changes. Exam benign as well.  - Seen by and ortho.     Problem/Plan - 3:  ·  Problem: Hypokalemia.  Plan: -Received Kcl 40 already. Will give additional KCl 40 meq. F/u Mag as well.  --Had some TWI, which could be related to low K. Repeat EKG in AM. She has no CP or other acute symptoms.     Problem/Plan - 4:  ·  Problem: Esophageal candidiasis.  resume at home; presumably due to steroids taken for back pain.    Problem/Plan - 5:  ·  Problem: HTN (hypertension). Plan: -Hold home cardizem for now. -Check orthostatics.   -Resume BP meds as appropriate.    Problem/Plan - 6:  Problem: Sjogren's syndrome.Plan: Cont usual home meds.    Problem/Plan - 7:  ·  Problem: Primary biliary cirrhosis. Plan: Cont ursodiol.    Problem/Plan - 8:  ·  Problem: Hypothyroid. Plan: Cont LT4. TSH WNL.    Problem/Plan - 9:  ·  Problem: Rheumatoid arthritis. Plan: Plaquenil.            Care Discussed with Consultants/Other Providers: Dr. Oviedo, her psychiatrist, and her brother, who is her health care proxy.

## 2020-10-31 NOTE — CHART NOTE - NSCHARTNOTEFT_GEN_A_CORE
Psych consulted for capacity to leave AMA. Psych spoke with attending Dr. Melara regarding medical recommendations for this patient. Dr. Melara stated medical recommendation was inpatient detox. Psych informed Dr. Melara patients cannot be involuntarily admitted to inpatient detox. Discussed with Dr. Melara substance use is chronic risk but is not acute risk warranting involuntary admission. Dr. Melara confirmed pt denied SI. Consult canceled.

## 2020-10-31 NOTE — PHYSICAL THERAPY INITIAL EVALUATION ADULT - PERTINENT HX OF CURRENT PROBLEM, REHAB EVAL
59 year old female with PMH of RA, asthma, Sjogren's syndrome, primary biliary cirrhosis, Chilblain's, hypothyroidism who presents s/p fall at home. Pt states that she woke up this morning and was getting out of bed when her legs suddenly gave out. She fell down and hit her head against night stand. Pt denied LOC.  CT head no acute findings.

## 2020-10-31 NOTE — DISCHARGE NOTE PROVIDER - NSDCCPCAREPLAN_GEN_ALL_CORE_FT
PRINCIPAL DISCHARGE DIAGNOSIS  Diagnosis: Lower extremity weakness  Assessment and Plan of Treatment: MRI spine done showed no acute pathology       PRINCIPAL DISCHARGE DIAGNOSIS  Diagnosis: Lower extremity weakness  Assessment and Plan of Treatment: MRI spine and CT head showed no acute pathology. Possibly related to opioid use. Please follow up with pain management regarding tapering of opioids. Please follow up at [facility] at [facility] [phone] if you are in need of assistance with substance abuse and abstinence. Call to make an appointment.        SECONDARY DISCHARGE DIAGNOSES  Diagnosis: Hypothyroid  Assessment and Plan of Treatment: Continue your thyroid medications as recommended and follow-up with your outpatient provider for continual thyroid function testing and further medication management.      Diagnosis: Rheumatoid arthritis  Assessment and Plan of Treatment: Continue with Plaquenil as per home regimen. Follow up outpatient PCP in 1-2 weeks for further management.     PRINCIPAL DISCHARGE DIAGNOSIS  Diagnosis: Lower extremity weakness  Assessment and Plan of Treatment: MRI spine and CT head showed no acute pathology. Possibly related to opioid use. Please follow up with pain management regarding tapering of opioids. Please follow up at [facility] at [facility] [phone] if you are in need of assistance with substance abuse and abstinence. Call to make an appointment.        SECONDARY DISCHARGE DIAGNOSES  Diagnosis: Urinary tract infection  Assessment and Plan of Treatment: Continue antibiotics as directed and monitor for signs/symptoms of infection, such as, fever/chills, burning/pain with urination, urinary frequency/hesitancy, cloudy urine, or blood in urine.      Diagnosis: Hypothyroid  Assessment and Plan of Treatment: Continue your thyroid medications as recommended and follow-up with your outpatient provider for continual thyroid function testing and further medication management.      Diagnosis: Rheumatoid arthritis  Assessment and Plan of Treatment: Continue with Plaquenil as per home regimen. Follow up outpatient PCP in 1-2 weeks for further management.

## 2020-10-31 NOTE — DISCHARGE NOTE NURSING/CASE MANAGEMENT/SOCIAL WORK - PATIENT PORTAL LINK FT
You can access the FollowMyHealth Patient Portal offered by [facility] by registering at the following website: [email or web address]. By joining [employer]’s FollowMyHealth portal, you will also be able to view your health information using other applications (apps) compatible with our system.

## 2020-11-01 LAB
CULTURE RESULTS: SIGNIFICANT CHANGE UP
SPECIMEN SOURCE: SIGNIFICANT CHANGE UP

## 2020-12-19 ENCOUNTER — EMERGENCY (EMERGENCY)
Facility: HOSPITAL | Age: 60
LOS: 1 days | Discharge: HOME | End: 2020-12-19
Attending: EMERGENCY MEDICINE
Payer: COMMERCIAL

## 2020-12-19 VITALS
DIASTOLIC BLOOD PRESSURE: 87 MMHG | SYSTOLIC BLOOD PRESSURE: 120 MMHG | OXYGEN SATURATION: 100 % | TEMPERATURE: 99 F | HEART RATE: 103 BPM | RESPIRATION RATE: 20 BRPM | HEIGHT: 59 IN | WEIGHT: 134.92 LBS

## 2020-12-19 VITALS
SYSTOLIC BLOOD PRESSURE: 134 MMHG | OXYGEN SATURATION: 98 % | TEMPERATURE: 99 F | HEART RATE: 95 BPM | DIASTOLIC BLOOD PRESSURE: 89 MMHG | RESPIRATION RATE: 20 BRPM

## 2020-12-19 DIAGNOSIS — Z98.89 OTHER SPECIFIED POSTPROCEDURAL STATES: Chronic | ICD-10-CM

## 2020-12-19 DIAGNOSIS — Z90.49 ACQUIRED ABSENCE OF OTHER SPECIFIED PARTS OF DIGESTIVE TRACT: Chronic | ICD-10-CM

## 2020-12-19 DIAGNOSIS — Z90.89 ACQUIRED ABSENCE OF OTHER ORGANS: Chronic | ICD-10-CM

## 2020-12-19 LAB
ALBUMIN SERPL ELPH-MCNC: 4.6 G/DL — SIGNIFICANT CHANGE UP (ref 3.3–5)
ALP SERPL-CCNC: 87 U/L — SIGNIFICANT CHANGE UP (ref 40–120)
ALT FLD-CCNC: 22 U/L — SIGNIFICANT CHANGE UP (ref 10–45)
ANION GAP SERPL CALC-SCNC: 19 MMOL/L — HIGH (ref 5–17)
AST SERPL-CCNC: 18 U/L — SIGNIFICANT CHANGE UP (ref 10–40)
BASE EXCESS BLDV CALC-SCNC: 3.8 MMOL/L — HIGH (ref -2–2)
BASOPHILS # BLD AUTO: 0.09 K/UL — SIGNIFICANT CHANGE UP (ref 0–0.2)
BASOPHILS NFR BLD AUTO: 0.6 % — SIGNIFICANT CHANGE UP (ref 0–2)
BILIRUB SERPL-MCNC: 0.3 MG/DL — SIGNIFICANT CHANGE UP (ref 0.2–1.2)
BUN SERPL-MCNC: 17 MG/DL — SIGNIFICANT CHANGE UP (ref 7–23)
CA-I SERPL-SCNC: 1.14 MMOL/L — SIGNIFICANT CHANGE UP (ref 1.12–1.3)
CALCIUM SERPL-MCNC: 9.8 MG/DL — SIGNIFICANT CHANGE UP (ref 8.4–10.5)
CHLORIDE BLDV-SCNC: 105 MMOL/L — SIGNIFICANT CHANGE UP (ref 96–108)
CHLORIDE SERPL-SCNC: 98 MMOL/L — SIGNIFICANT CHANGE UP (ref 96–108)
CK SERPL-CCNC: 102 U/L — SIGNIFICANT CHANGE UP (ref 25–170)
CO2 BLDV-SCNC: 28 MMOL/L — SIGNIFICANT CHANGE UP (ref 22–30)
CO2 SERPL-SCNC: 22 MMOL/L — SIGNIFICANT CHANGE UP (ref 22–31)
CREAT SERPL-MCNC: 0.7 MG/DL — SIGNIFICANT CHANGE UP (ref 0.5–1.3)
EOSINOPHIL # BLD AUTO: 0.15 K/UL — SIGNIFICANT CHANGE UP (ref 0–0.5)
EOSINOPHIL NFR BLD AUTO: 1 % — SIGNIFICANT CHANGE UP (ref 0–6)
GAS PNL BLDV: 137 MMOL/L — SIGNIFICANT CHANGE UP (ref 135–145)
GAS PNL BLDV: SIGNIFICANT CHANGE UP
GAS PNL BLDV: SIGNIFICANT CHANGE UP
GLUCOSE BLDV-MCNC: 111 MG/DL — HIGH (ref 70–99)
GLUCOSE SERPL-MCNC: 110 MG/DL — HIGH (ref 70–99)
HCO3 BLDV-SCNC: 27 MMOL/L — SIGNIFICANT CHANGE UP (ref 21–29)
HCT VFR BLD CALC: 42 % — SIGNIFICANT CHANGE UP (ref 34.5–45)
HCT VFR BLDA CALC: 42 % — SIGNIFICANT CHANGE UP (ref 39–50)
HGB BLD CALC-MCNC: 13.5 G/DL — SIGNIFICANT CHANGE UP (ref 11.5–15.5)
HGB BLD-MCNC: 13 G/DL — SIGNIFICANT CHANGE UP (ref 11.5–15.5)
HOROWITZ INDEX BLDV+IHG-RTO: SIGNIFICANT CHANGE UP
IMM GRANULOCYTES NFR BLD AUTO: 1.1 % — SIGNIFICANT CHANGE UP (ref 0–1.5)
LACTATE BLDV-MCNC: 2.1 MMOL/L — HIGH (ref 0.7–2)
LYMPHOCYTES # BLD AUTO: 1.44 K/UL — SIGNIFICANT CHANGE UP (ref 1–3.3)
LYMPHOCYTES # BLD AUTO: 9.6 % — LOW (ref 13–44)
MCHC RBC-ENTMCNC: 24.7 PG — LOW (ref 27–34)
MCHC RBC-ENTMCNC: 31 GM/DL — LOW (ref 32–36)
MCV RBC AUTO: 79.8 FL — LOW (ref 80–100)
MONOCYTES # BLD AUTO: 0.91 K/UL — HIGH (ref 0–0.9)
MONOCYTES NFR BLD AUTO: 6.1 % — SIGNIFICANT CHANGE UP (ref 2–14)
NEUTROPHILS # BLD AUTO: 12.22 K/UL — HIGH (ref 1.8–7.4)
NEUTROPHILS NFR BLD AUTO: 81.6 % — HIGH (ref 43–77)
NRBC # BLD: 0 /100 WBCS — SIGNIFICANT CHANGE UP (ref 0–0)
PCO2 BLDV: 36 MMHG — SIGNIFICANT CHANGE UP (ref 35–50)
PH BLDV: 7.48 — HIGH (ref 7.35–7.45)
PLATELET # BLD AUTO: 421 K/UL — HIGH (ref 150–400)
PO2 BLDV: 34 MMHG — SIGNIFICANT CHANGE UP (ref 25–45)
POTASSIUM BLDV-SCNC: 3.8 MMOL/L — SIGNIFICANT CHANGE UP (ref 3.5–5.3)
POTASSIUM SERPL-MCNC: 4.1 MMOL/L — SIGNIFICANT CHANGE UP (ref 3.5–5.3)
POTASSIUM SERPL-SCNC: 4.1 MMOL/L — SIGNIFICANT CHANGE UP (ref 3.5–5.3)
PROT SERPL-MCNC: 6.7 G/DL — SIGNIFICANT CHANGE UP (ref 6–8.3)
RBC # BLD: 5.26 M/UL — HIGH (ref 3.8–5.2)
RBC # FLD: 14.7 % — HIGH (ref 10.3–14.5)
SAO2 % BLDV: 65 % — LOW (ref 67–88)
SARS-COV-2 RNA SPEC QL NAA+PROBE: SIGNIFICANT CHANGE UP
SODIUM SERPL-SCNC: 139 MMOL/L — SIGNIFICANT CHANGE UP (ref 135–145)
WBC # BLD: 14.97 K/UL — HIGH (ref 3.8–10.5)
WBC # FLD AUTO: 14.97 K/UL — HIGH (ref 3.8–10.5)

## 2020-12-19 PROCEDURE — 99284 EMERGENCY DEPT VISIT MOD MDM: CPT | Mod: 25

## 2020-12-19 PROCEDURE — 82803 BLOOD GASES ANY COMBINATION: CPT

## 2020-12-19 PROCEDURE — 72146 MRI CHEST SPINE W/O DYE: CPT

## 2020-12-19 PROCEDURE — 82330 ASSAY OF CALCIUM: CPT

## 2020-12-19 PROCEDURE — 93010 ELECTROCARDIOGRAM REPORT: CPT

## 2020-12-19 PROCEDURE — 80053 COMPREHEN METABOLIC PANEL: CPT

## 2020-12-19 PROCEDURE — 82435 ASSAY OF BLOOD CHLORIDE: CPT

## 2020-12-19 PROCEDURE — 72141 MRI NECK SPINE W/O DYE: CPT

## 2020-12-19 PROCEDURE — 84132 ASSAY OF SERUM POTASSIUM: CPT

## 2020-12-19 PROCEDURE — 72131 CT LUMBAR SPINE W/O DYE: CPT | Mod: 26

## 2020-12-19 PROCEDURE — 82550 ASSAY OF CK (CPK): CPT

## 2020-12-19 PROCEDURE — 76775 US EXAM ABDO BACK WALL LIM: CPT

## 2020-12-19 PROCEDURE — 83605 ASSAY OF LACTIC ACID: CPT

## 2020-12-19 PROCEDURE — 84295 ASSAY OF SERUM SODIUM: CPT

## 2020-12-19 PROCEDURE — 86140 C-REACTIVE PROTEIN: CPT

## 2020-12-19 PROCEDURE — 72141 MRI NECK SPINE W/O DYE: CPT | Mod: 26

## 2020-12-19 PROCEDURE — 76775 US EXAM ABDO BACK WALL LIM: CPT | Mod: 26

## 2020-12-19 PROCEDURE — 85025 COMPLETE CBC W/AUTO DIFF WBC: CPT

## 2020-12-19 PROCEDURE — 99285 EMERGENCY DEPT VISIT HI MDM: CPT

## 2020-12-19 PROCEDURE — 96374 THER/PROPH/DIAG INJ IV PUSH: CPT | Mod: XU

## 2020-12-19 PROCEDURE — U0003: CPT

## 2020-12-19 PROCEDURE — 85014 HEMATOCRIT: CPT

## 2020-12-19 PROCEDURE — 72146 MRI CHEST SPINE W/O DYE: CPT | Mod: 26

## 2020-12-19 PROCEDURE — 93005 ELECTROCARDIOGRAM TRACING: CPT

## 2020-12-19 PROCEDURE — 82947 ASSAY GLUCOSE BLOOD QUANT: CPT

## 2020-12-19 PROCEDURE — 72148 MRI LUMBAR SPINE W/O DYE: CPT | Mod: 26

## 2020-12-19 PROCEDURE — 72131 CT LUMBAR SPINE W/O DYE: CPT

## 2020-12-19 PROCEDURE — 82565 ASSAY OF CREATININE: CPT

## 2020-12-19 PROCEDURE — 72148 MRI LUMBAR SPINE W/O DYE: CPT

## 2020-12-19 PROCEDURE — 85652 RBC SED RATE AUTOMATED: CPT

## 2020-12-19 PROCEDURE — 85018 HEMOGLOBIN: CPT

## 2020-12-19 RX ORDER — METHOCARBAMOL 500 MG/1
750 TABLET, FILM COATED ORAL ONCE
Refills: 0 | Status: COMPLETED | OUTPATIENT
Start: 2020-12-19 | End: 2020-12-19

## 2020-12-19 RX ORDER — LIDOCAINE 4 G/100G
1 CREAM TOPICAL ONCE
Refills: 0 | Status: COMPLETED | OUTPATIENT
Start: 2020-12-19 | End: 2020-12-19

## 2020-12-19 RX ORDER — METHOCARBAMOL 500 MG/1
1 TABLET, FILM COATED ORAL
Qty: 5 | Refills: 0
Start: 2020-12-19 | End: 2020-12-23

## 2020-12-19 RX ORDER — SODIUM CHLORIDE 9 MG/ML
1000 INJECTION INTRAMUSCULAR; INTRAVENOUS; SUBCUTANEOUS ONCE
Refills: 0 | Status: COMPLETED | OUTPATIENT
Start: 2020-12-19 | End: 2020-12-19

## 2020-12-19 RX ORDER — DIAZEPAM 5 MG
5 TABLET ORAL ONCE
Refills: 0 | Status: DISCONTINUED | OUTPATIENT
Start: 2020-12-19 | End: 2020-12-19

## 2020-12-19 RX ORDER — ACETAMINOPHEN 500 MG
1000 TABLET ORAL ONCE
Refills: 0 | Status: COMPLETED | OUTPATIENT
Start: 2020-12-19 | End: 2020-12-19

## 2020-12-19 RX ADMIN — Medication 400 MILLIGRAM(S): at 09:53

## 2020-12-19 RX ADMIN — METHOCARBAMOL 750 MILLIGRAM(S): 500 TABLET, FILM COATED ORAL at 15:39

## 2020-12-19 RX ADMIN — Medication 1000 MILLIGRAM(S): at 11:00

## 2020-12-19 RX ADMIN — SODIUM CHLORIDE 1000 MILLILITER(S): 9 INJECTION INTRAMUSCULAR; INTRAVENOUS; SUBCUTANEOUS at 11:10

## 2020-12-19 RX ADMIN — Medication 5 MILLIGRAM(S): at 11:36

## 2020-12-19 RX ADMIN — LIDOCAINE 1 PATCH: 4 CREAM TOPICAL at 11:36

## 2020-12-19 NOTE — ED PROVIDER NOTE - PROGRESS NOTE DETAILS
Attending Carlie Tejada: d/w spine and radiology to facilitte MRI. bladder scan with approximately 200cc urine Attending Carlie Tejada: called MRI again to try to facilitate. Attending Carlie Tejada: MRI without evidence of cord injury. pt feeling better. ambulating.

## 2020-12-19 NOTE — ED ADULT NURSE REASSESSMENT NOTE - NS ED NURSE REASSESS COMMENT FT1
Pt states she feels much better with the valium, Pt MRI screening reviewed and signed with Pt. Pt awaiting MRI transport

## 2020-12-19 NOTE — ED ADULT NURSE NOTE - NSIMPLEMENTINTERV_GEN_ALL_ED
Implemented All Fall Risk Interventions:  [place] to call system. Call bell, personal items and telephone within reach. Instruct patient to call for assistance. Room bathroom lighting operational. Non-slip footwear when patient is off stretcher. Physically safe environment: no spills, clutter or unnecessary equipment. Stretcher in lowest position, wheels locked, appropriate side rails in place. Provide visual cue, wrist band, yellow gown, etc. Monitor gait and stability. Monitor for mental status changes and reorient to person, place, and time. Review medications for side effects contributing to fall risk. Reinforce activity limits and safety measures with patient and family.

## 2020-12-19 NOTE — ED ADULT NURSE NOTE - OBJECTIVE STATEMENT
60y Female A&OX3 came by EMS from home c/o mid/upper back pain. PMH of Asthma, Autoimmune skin disease, Hypothyroid, Neuropathy, Sjogren's syndrome. Pt states since Thursday, Pt started to have generalized body aches, Pt endorses slip and fall yesterday where Pt slipped and fell forward on knees, no trauma to head, LOC, nausea, dizziness. Pt today woke up with mid/upper back pain, came to ED by EMS. Pt presents with 10/10 midline mid/upper back pain that radiates down and sometimes radiates to L arm. Denies chest pain, sob, ha, n/v/d, abdominal pain, f/c, urinary symptoms, hematuria. Upon examination, full facial symmetry, no JVD or trach deviation, lung sounds clear and adequate chest rise, S1 and S2 heart sounds present, +2 pulses in all extremities with full ROM and equal strength, cap refill <2 seconds, ABD soft, non distended and non tender, pelvis intact. 60y Female A&OX3 came by EMS from home c/o mid/upper back pain. PMH of Asthma, Autoimmune skin disease, Hypothyroid, Neuropathy, Sjogren's syndrome. Pt states since Thursday, Pt started to have generalized body aches, Pt endorses slip and fall yesterday where Pt slipped and fell forward on knees, no trauma to head, LOC, nausea, dizziness. Pt today woke up with mid/upper back pain, Pt generally takes methadone, Pt took her normal dose of methadone and came to ED by EMS. Pt presents with 10/10 midline mid/upper back pain that radiates down and sometimes radiates to L arm. Denies chest pain, sob, ha, n/v/d, abdominal pain, f/c, urinary symptoms, hematuria. Upon examination, full facial symmetry, no JVD or trach deviation, lung sounds clear and adequate chest rise, S1 and S2 heart sounds present, +2 pulses in all extremities with full ROM and equal strength, cap refill <2 seconds, ABD soft, non distended and non tender, pelvis intact.

## 2020-12-19 NOTE — ED ADULT NURSE REASSESSMENT NOTE - NS ED NURSE REASSESS COMMENT FT1
Pt states Orfimev helped "keep the edge off", after coming back from CT, Pt states pain worsened. Pt given Lidocaine patch and Valium, Pt resting and sleeping now. VSS

## 2020-12-19 NOTE — ED PROVIDER NOTE - PHYSICAL EXAMINATION
Attending Carlie Tejada: Gen: NAD, heent: atrauamtic, eomi, perrla, mmm, op pink, uvula midline, neck; nttp, no nuchal rigidity, chest: nttp, no crepitus, cv: rrr, no murmurs, lungs: ctab, abd: soft, nontender, nondistended, no peritoneal signs, +BS, no guarding, ext: wwp,ttp lumbar thoracic spine skin: no rash, neuro: awake and alert, following commands, speech clear, sensation intact,pt refusing to move her lower extremities due to pain, upper extremity strength intact

## 2020-12-19 NOTE — ED PROVIDER NOTE - OBJECTIVE STATEMENT
This is a 59F with PMH of RA, asthma, Sjogren's syndrome, primary biliary cirrhosis, Chilblain's, hypothyroidism, L3-L4 disc herniation s/p discectomy (Oct 2020) on  methadone chronically presents with 1 day of  mid and low back pain 9.5/10 in severity. Pain is sharp & shoots down to the R knee. hx of R knee surgery for torn medial meniscus. Last night pt reports feeling unwell with myalgias, subjective fevers and chills, and diarrhea. Denies any falls or trauma. Unclear trigger, pain started when she tried to sit up this morning. She reports hx of urinary retention with previous episodes of back pain. Last urinated last night.     Pain management dr Thom Falk  Neurosurg Dr Dubon

## 2020-12-19 NOTE — ED PROVIDER NOTE - PATIENT PORTAL LINK FT
You can access the FollowMyHealth Patient Portal offered by [facility] by registering at the following website: [email or web address]. By joining [employer]’s FollowMyHealth portal, you will also be able to view your health information using other applications (apps) compatible with our system.

## 2020-12-19 NOTE — ED PROVIDER NOTE - CLINICAL SUMMARY MEDICAL DECISION MAKING FREE TEXT BOX
60y F with PMH of RA, asthma, Sjogren's syndrome, primary biliary cirrhosis, Chilblain's, hypothyroidism, L3-4L4 discectomy, on methadone presents with myalgias, diarrhea and low back pain. Plan- ecg, labs, bladder scan, pain control, U/A. 60y F with PMH of RA, asthma, Sjogren's syndrome, primary biliary cirrhosis, Chilblain's, hypothyroidism, L3-L4 discectomy, on methadone presents with myalgias, diarrhea and low back pain. Plan- ecg, labs, bladder scan, pain control, U/A. Radiology called to protocol urgent MR to r/o cord compression. Bladder scan with 200cc. 60y F with PMH of RA, asthma, Sjogren's syndrome, primary biliary cirrhosis, Chilblain's, hypothyroidism, L3-L4 discectomy, on methadone presents with myalgias, diarrhea and low back pain. Plan- ecg, labs, bladder scan, pain control, U/A. Radiology called to protocol urgent MR to r/o cord compression. Bladder scan with 200cc.  Attending Carlie Tejada: 61 y/o female with multiple medical issues including h/o ashtma, sjogrens, h/o lumbar disc disease with prior L3-L4 discectomy currently on methadone presenting with lower back pain. upon arrival pt complaining of lower back discomfort and neck pain. pt is on methadone. sensation intact and bladder scan without evidence of sig urinary retetnion. denies any trauma or falls. as limited exam and pt complaining of increased pain tingling, MRI obtained to further evaluate which were negative for caudal equina. pt's painimproved with robaxin and able to ambulate without difficulty. dw pt close return precautions for any changes or worsneing. instructed to follow up with spine doctor

## 2020-12-19 NOTE — ED PROVIDER NOTE - PLAN OF CARE
You came in for back pain which is likely due to your chronic degenerative changes and disc disease. Your CT scan and MRI were both consistent with your prior studies and did not show any new change. Please follow up with your primary care doctor, pain management and neurosurgeon regarding this issue. You may take Tylenol and Advil as needed for pain as well as Robaxin. Please return to the ED if you develop urinary incontinence, numbness of the groin/legs and are unable to walk.

## 2020-12-19 NOTE — CONSULT NOTE ADULT - SUBJECTIVE AND OBJECTIVE BOX
Orthopedic Spine Consult Note    Patient is a 60y Female PMH of chronic pain, RA, asthma, Sjogren's syndrome, primary biliary cirrhosis, Chilblain's, hypothyroidism, s/p L3-4 lumbar discectomy with Dr Dubon in October 2020 presents to the [facility] ED with severe back pain.  Reports that the pain began this morning when she tried to sit up, was unable to walk due to pain.  Denies recent trauma/injury, denies inciting events.  Denies numbness/tingling/paresthesias/weakness. Denies bowel/bladder incontinence. Denies fevers/chills. No other complaints at this time.    HEALTH ISSUES - PROBLEM Dx:      MEDICATIONS  (STANDING):      Allergies    aspirin (Anaphylaxis)  ibuprofen (Unknown)  Levaquin (Anaphylaxis)  mirtazapine (Angioedema; Rash)  penicillin (Unknown)    Intolerances    Neurontin (Other)      PAST MEDICAL & SURGICAL HISTORY:  Autoimmune skin disease  Neuropathy  Hypothyroid  Sjogren&#x27;s syndrome  Lupus  Primary biliary cirrhosis  Asthma  S/P appendectomy  S/P knee surgery  S/P tonsillectomy  S/P cholecystectomy                              13.0   14.97 )-----------( 421      ( 19 Dec 2020 09:50 )             42.0       19 Dec 2020 09:50    139    |  98     |  17     ----------------------------<  110    4.1     |  22     |  0.70     Ca    9.8        19 Dec 2020 09:50    TPro  6.7    /  Alb  4.6    /  TBili  0.3    /  DBili  x      /  AST  18     /  ALT  22     /  AlkPhos  87     19 Dec 2020 09:50              Vital Signs Last 24 Hrs  T(C): 37.1 (12-19-20 @ 09:03), Max: 37.1 (12-19-20 @ 09:03)  T(F): 98.8 (12-19-20 @ 09:03), Max: 98.8 (12-19-20 @ 09:03)  HR: 86 (12-19-20 @ 15:49) (86 - 103)  BP: 116/81 (12-19-20 @ 15:49) (116/81 - 124/86)  BP(mean): --  RR: 20 (12-19-20 @ 15:49) (20 - 20)  SpO2: 99% (12-19-20 @ 15:49) (98% - 100%)      Physical exam  Gen: NAD  Resp: no increased WOB on RA  Spine PE:  Skin intact  No gross deformity  No midline TTP C/T/L/S spine  No bony step offs  R paraspinal TTP  Negative clonus  Negative babinski    Motor:                 L2             L3             L4               L5            S1  R         5/5           5/5          5/5             5/5           5/5  L          5/5          5/5           5/5             5/5           5/5    Sensory:               L2          L3         L4      L5       S1         (0=absent, 1=impaired, 2=normal, NT=not testable)  R         2            2            2        2        2  L          2            2           2        2         2    Imaging:    < from: MR Lumbar Spine No Cont (12.19.20 @ 14:32) >  Comparison is made with the prior MRI of 10/30/2020 and the recent CT of 12/19/2020.        The cervical vertebrae are normal in height and signal intensity. There are no acute fractures or dislocations. There is no spinal stenosis. The cervical cord is normal in size, contour, and signal characteristics. There is a mild annular bulge at the C6-7 level.    The thoracic vertebral bodies are normal in height and signal intensity. No acute fractures or dislocations are identified. There is a small disc herniation at the T7-8 level which causes a mild ventral extradural defect without cord compression. This is slightly better seen on the current examination as the prior examination demonstrated motion artifact.    Evaluation of the lumbar spine demonstrates the lumbar vertebral bodies to be normal in height and signal intensity. Disc space narrowing is present at L3-4. There has been a partial facetectomy on the left at L3-4. Postoperative changes are noted. There is a small central disc herniation at the L3-4 level without significant thecal sac compression which is unchanged since the prior exam. There is no significant cauda equina compression.    The conus medullaris is normal in appearance and terminates at the L1-2 level      Paraspinal musculature is unremarkable.      IMPRESSION: No cord or cauda equina compression. Mild bulge C6-7. Small disc herniation T7-8 without cord compression.  Small disc herniation L3-4 unchanged since 10/30/2020 . Partial left facetectomy L3-4 involving the foraminal zone with postoperative changes unchanged since 10/30/2020.      < end of copied text >      A/P: 60y Female with chronic pain s/p L3-4 lumbar discectomy in 10/2020 presenting with severe low back pain, MRI showing no evidence of new pathology.      Pain control  WBAT with assistive devices as needed  FU with Dr Dubon in outpatient office in 1-2 weeks, call for appt: [phone]

## 2020-12-19 NOTE — ED PROVIDER NOTE - ATTENDING CONTRIBUTION TO CARE
Attending MD Carlie Tejada:  I personally have seen and examined this patient.  Resident note reviewed and agree on plan of care and except where noted.  See HPI, PE, and MDM for details.

## 2020-12-19 NOTE — ED ADULT NURSE REASSESSMENT NOTE - NS ED NURSE REASSESS COMMENT FT1
As per Gilma THOMAS, Pt cleared for discharge. Pt ambulatory with steady gait, discharged to waiting, Pt to get Uber home.

## 2020-12-19 NOTE — ED PROVIDER NOTE - CARE PLAN
Principal Discharge DX:	Chronic bilateral low back pain without sciatica  Assessment and plan of treatment:	You came in for back pain which is likely due to your chronic degenerative changes and disc disease. Your CT scan and MRI were both consistent with your prior studies and did not show any new change. Please follow up with your primary care doctor, pain management and neurosurgeon regarding this issue. You may take Tylenol and Advil as needed for pain as well as Robaxin. Please return to the ED if you develop urinary incontinence, numbness of the groin/legs and are unable to walk.

## 2021-01-19 ENCOUNTER — APPOINTMENT (OUTPATIENT)
Dept: HEPATOLOGY | Facility: CLINIC | Age: 61
End: 2021-01-19

## 2021-03-10 NOTE — ED PROVIDER NOTE - GASTROINTESTINAL [+], MLM
Neurology H&P    Ortega Loges Patient Status:  No patient class for patient encounter    10/9/1937 MRN [id number]   Location [address] [place] [address], [address] [place] [address], [address] [place] [address] Attending No att. providers found   Hosp Day # 0 PCP Philly Cortes differently: Take 60 mg by mouth 3 (three) times daily.  ) 120 tablet 0   • metoprolol Tartrate 25 MG Oral Tab Take 1 tablet (25 mg total) by mouth 2x Daily(Beta Blocker). (Patient taking differently: Take 50 mg by mouth 2x Daily(Beta Blocker).   ) 60 table for Cyst removal from throat per pt   • Renal disorder    • Scar tissue     of the Esophagus-    Has Gastrostomy tube   • Stroke [facility])     11/2020/   pt is receving PT and OT therapy- using walker but wheelchair if long distances       PSHx:  Past Surgical knowledge. CRANIAL NERVES II to XII: PERRLA, no ptosis or diplopia, EOM intact, facial sensation intact, strong eye closure, mild L NLF flattening, no dysarthria, tongue midline,  no tongue fasciculations or atrophy, strong shoulder shrug.     MOTOR EX (this was done as part of addition testing)    Assessment: This is an 83 y/o female with Afib and R MCA stroke s/op mechanical thrombectomy in 10/2020. She can continue Apixaban for secondary stroke prevention and Afib.  Is on Ezetimibe for cholesterol con DIARRHEA

## 2021-05-07 NOTE — H&P ADULT - NEGATIVE MUSCULOSKELETAL SYMPTOMS
no joint swelling/no arthralgia/no muscle weakness/no myalgia/no stiffness/no neck pain/no arthritis/no muscle cramps 1

## 2022-04-22 NOTE — DISCHARGE NOTE NURSING/CASE MANAGEMENT/SOCIAL WORK - NSDCFUADDAPPT_GEN_ALL_CORE_FT
Follow up with PCP within 1-2 weeks of discharge.   Follow up with neurology within 1-2 weeks of discharge. Consent (Ear)/Introductory Paragraph: The rationale for Mohs was explained to the patient and consent was obtained. The risks, benefits and alternatives to therapy were discussed in detail. Specifically, the risks of ear deformity, infection, scarring, bleeding, prolonged wound healing, incomplete removal, allergy to anesthesia, nerve injury and recurrence were addressed. Prior to the procedure, the treatment site was clearly identified and confirmed by the patient. All components of Universal Protocol/PAUSE Rule completed.

## 2022-05-20 NOTE — ED ADULT NURSE REASSESSMENT NOTE - NS ED NURSE REASSESS COMMENT FT1
report recd from daytime rn, patient resting comfortably and in nad, medicated as per orders, nsr per monitor. will ctm closely. You can access the FollowMyHealth Patient Portal offered by [facility] by registering at the following website: [email or web address]. By joining [facility] FollowMyHealth portal, you will also be able to view your health information using other applications (apps) compatible with our system.

## 2022-07-25 NOTE — PATIENT PROFILE ADULT - NSPROMEDSADMININFO_GEN_A_NUR
Called pt Mark,  No answer left voicemail to call the office back about lab results or check the pt portal.    no concerns

## 2022-08-30 NOTE — H&P ADULT - GIT ABD PE PAL DETAILS PC
tender Doxycycline Counseling:  Patient counseled regarding possible photosensitivity and increased risk for sunburn.  Patient instructed to avoid sunlight, if possible.  When exposed to sunlight, patients should wear protective clothing, sunglasses, and sunscreen.  The patient was instructed to call the office immediately if the following severe adverse effects occur:  hearing changes, easy bruising/bleeding, severe headache, or vision changes.  The patient verbalized understanding of the proper use and possible adverse effects of doxycycline.  All of the patient's questions and concerns were addressed.

## 2022-09-18 NOTE — PHYSICAL THERAPY INITIAL EVALUATION ADULT - LEVEL OF INDEPENDENCE: SIT/SUPINE, REHAB EVAL
8 year old male with history of SC trait, persistent mild-moderate neutropenia (followed by heme), and PFO (followed by cardiology) presents with pain in his chest, arms, legs, and SOB that began at 6:30AM.   Last ANC 1.35 on 9/9.    Patient is well appearing and not endorsing any pain or SOB in the ED. Patient with diminished breath sounds on initial PE possibly due to poor effort but given history will do chest xray and labs. minimum assist (75% patients effort)

## 2023-03-29 NOTE — OCCUPATIONAL THERAPY INITIAL EVALUATION ADULT - PHYSICAL ASSIST/NONPHYSICAL ASSIST: SUPINE/SIT, REHAB EVAL
[Joint Pain] : joint pain [Negative] : Heme/Lymph [Joint Stiffness] : no joint stiffness [Joint Swelling] : no joint swelling 1 person assist
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
## 2024-01-09 NOTE — ED PROVIDER NOTE - NS ED MD EM SELECTION
LD VALENCIA  61y Male  MRN:[id number]    Patient is a 61y old  Male who presents with a chief complaint of NSTEMI (01 Nov 2023 20:29)    HPI:  60yo M w/ hx HTN, CAD w/ 1 stent in 2009, ICH (2008) presenting with abn ekg. Patient presented to [facility] where he was found to have STEMI, recommended to get cath however patient did not want to get it there so it left and came here.  Patient initially had cough, congestion, fever, was placed on antibiotics on Sunday.  Started feeling nauseous and had a presyncopal event after which he presented to ED last night.  Had chest pain as well.  Chest pain is midsternal.  Not currently having chest pain.  Received 4 aspirin 30 min pta. (01 Nov 2023 15:11)      Patient seen and evaluated at bedside. interval events noted    Interval HPI:   no acute events o/n     PAST MEDICAL & SURGICAL HISTORY:  HTN (hypertension)      CAD (coronary artery disease)  2009; stent      Intracranial hemorrhage  2008      Respiratory arrest  december 1st      Myocardial infarction, unspecified MI type, unspecified artery      History of coronary artery stent placement          REVIEW OF SYSTEMS:  as per hpi     VITALS:   Vital Signs Last 24 Hrs  T(C): 36.3 (09 Jan 2024 08:48), Max: 36.8 (09 Jan 2024 06:10)  T(F): 97.4 (09 Jan 2024 08:48), Max: 98.2 (09 Jan 2024 06:10)  HR: 92 (09 Jan 2024 09:20) (88 - 98)  BP: 104/63 (09 Jan 2024 09:20) (95/60 - 131/70)  BP(mean): 77 (09 Jan 2024 06:10) (72 - 82)  RR: 18 (09 Jan 2024 09:20) (16 - 20)  SpO2: 96% (09 Jan 2024 09:20) (94% - 97%)    Parameters below as of 09 Jan 2024 09:20  Patient On (Oxygen Delivery Method): room air         PHYSICAL EXAM:  GENERAL: NAD, comfortable.    HEAD:  Atraumatic, Normocephalic  EYES: EOMI, PERRLA, conjunctiva and sclera clear  NECK:  No JVD.    CHEST/LUNG: Clear to auscultation bilaterally; No wheeze    HEART: Regular rate and rhythm; No murmurs, rubs, or gallops  ABDOMEN: Soft, Nontender, Nondistended; Bowel sounds present  EXTREMITIES:  2+ Peripheral Pulses, No clubbing, cyanosis, or edema  NEUROLOGY: a0x3          Consultant(s) Notes Reviewed:  [x ] YES  [ ] NO  Care Discussed with Consultants/Other Providers [ x] YES  [ ] NO    MEDS:   MEDICATIONS  (STANDING):  budesonide  80 MICROgram(s)/formoterol 4.5 MICROgram(s) Inhaler 1 Puff(s) Inhalation two times a day  chlorhexidine 4% Liquid 1 Application(s) Topical <User Schedule>  dextrose 5%. 1000 milliLiter(s) (100 mL/Hr) IV Continuous <Continuous>  dextrose 5%. 1000 milliLiter(s) (50 mL/Hr) IV Continuous <Continuous>  dextrose 50% Injectable 50 milliLiter(s) IV Push every 15 minutes  fluconAZOLE   Tablet 100 milliGRAM(s) Oral <User Schedule>  glucagon  Injectable 1 milliGRAM(s) IntraMuscular once  hydrALAZINE 25 milliGRAM(s) Oral every 8 hours  insulin glargine Injectable (LANTUS) 33 Unit(s) SubCutaneous at bedtime  insulin lispro (ADMELOG) corrective regimen sliding scale   SubCutaneous three times a day before meals  insulin lispro (ADMELOG) corrective regimen sliding scale   SubCutaneous <User Schedule>  insulin lispro Injectable (ADMELOG) 20 Unit(s) SubCutaneous before breakfast  insulin lispro Injectable (ADMELOG) 17 Unit(s) SubCutaneous before dinner  insulin lispro Injectable (ADMELOG) 17 Unit(s) SubCutaneous before lunch  insulin regular Infusion 3 Unit(s)/Hr (3 mL/Hr) IV Continuous <Continuous>  lidocaine   4% Patch 3 Patch Transdermal daily  magnesium oxide 400 milliGRAM(s) Oral three times a day with meals  melatonin 5 milliGRAM(s) Oral at bedtime  mycophenolate mofetil 1000 milliGRAM(s) Oral every 12 hours  NIFEdipine XL 60 milliGRAM(s) Oral every 12 hours  OLANZapine 5 milliGRAM(s) Oral at bedtime  pantoprazole    Tablet 40 milliGRAM(s) Oral every 24 hours  polyethylene glycol 3350 17 Gram(s) Oral daily  predniSONE   Tablet 15 milliGRAM(s) Oral every 12 hours  senna 2 Tablet(s) Oral at bedtime  tacrolimus 6 milliGRAM(s) Oral <User Schedule>  trimethoprim   80 mG/sulfamethoxazole 400 mG 1 Tablet(s) Oral daily  valGANciclovir 450 milliGRAM(s) Oral every 12 hours    MEDICATIONS  (PRN):  acetaminophen     Tablet .. 650 milliGRAM(s) Oral every 6 hours PRN Mild pain  dextrose Oral Gel 15 Gram(s) Oral once PRN Blood Glucose LESS THAN 70 milliGRAM(s)/deciliter        Allergies    penicillins (Unknown)    Intolerances        LABS:                                                                                   9.8    7.99  )-----------( 200      ( 09 Jan 2024 08:39 )             29.9   01-09    132<L>  |  94<L>  |  44<H>  ----------------------------<  313<H>  4.6   |  23  |  1.17    Ca    9.5      09 Jan 2024 08:39  Phos  2.1     01-09  Mg     2.1     01-09    TPro  5.9<L>  /  Alb  3.8  /  TBili  0.2  /  DBili  x   /  AST  42<H>  /  ALT  68<H>  /  AlkPhos  58  01-09      < from: CT Chest No Cont (01.05.24 @ 09:25) >  IMPRESSION:  Question mild stercoral colitis.  Expected postoperative changes in the chest. No fluid collection.        --- End of Report ---    < end of copied text >      < from: CT Abdomen and Pelvis No Cont (11.28.23 @ 03:38) >  IMPRESSION:  Mildly dilated colon and prominent but not overly dilated small bowel   with air-fluid levels. No discrete transition point. Findings are   suggestive of ileus.    Intra-aortic balloon pump with the inferior marker at the level of the   inferior mesenteric artery and the balloon overlying the origins of the   renal arteries, celiac artery, and superior mesenteric artery. Consider   repositioning. Concern for IABP positioning was discussed with LANETTE Hawthorne   on 11/28/2023 at 3:42 AM by Dr. Shepard.    < end of copied text >          < from: Colonoscopy (11.17.23 @ 10:35) >                                                                                                        Impression:          - The entire examined colon is normal on direct and retroflexion views.                       - No specimens collected.  Recommendation:      - Resume previous diet today.                       - No large polyps or masses detected, No objection from GI standpoint to                 proceed with heart transplantation/advanced heart therapies.                       - Please call back should any further questions or concerns arise.    < end of copied text >     < from: Xray Chest 1 View- PORTABLE-Urgent (11.01.23 @ 07:42) >    IMPRESSION:  Clear lungs.    ---End of Report ---        < end of copied text >  < from: TTE W or WO Ultrasound Enhancing Agent (11.01.23 @ 10:23) >  _____________________________     CONCLUSIONS:      1. Left ventricular cavity is moderately dilated. Left ventricular wall thickness is normal. Left ventricular systolic function is severely decreased with an ejection fraction of 32 % by Chinchilla's method of disks. Regional wall motion abnormalities present.   2. Multiple segmental abnormalities exist. See findings.   3. There is moderate (grade 2) left ventricular diastolic dysfunction, with indeterminant filling pressure.   4. Normal right ventricular cavity size, wall thickness, and systolic function.   5. No significant valvular disease.   6. No pericardial effusion seen.   7. Compared to the transthoracic echocardiogram performed on 1/25/2017 the areas of akinesis are unchanged but there has been a decline in LV systolic function with new areas of hypokinesis.    __________________________________________________________________    < end of copied text >  < from: TTE Limited W or WO Ultrasound Enhancing Agent (11.02.23 @ 07:41) >  __________________________     CONCLUSIONS:      1. After obtaining consent, Definity ultrasound enhancing agent was given for enhanced left ventricular opacification and improved delineation of the left ventricular endocardial borders. Left ventricular systolic function is severely decreased with a calculated ejection fraction of 22 % by the Chinchilla's biplane method of disks. There is a left ventricular thrombus.   2. Findings were discussed with Litzy BOSS on 11/2/2023 at 8.49am.   3. There is a left ventricular thrombus.    _________________________________________________________________    < end of copied text >   LD VALENCIA  61y Male  MRN:[id number]    Patient is a 61y old  Male who presents with a chief complaint of NSTEMI (01 Nov 2023 20:29)    HPI:  62yo M w/ hx HTN, CAD w/ 1 stent in 2009, ICH (2008) presenting with abn ekg. Patient presented to [facility] where he was found to have STEMI, recommended to get cath however patient did not want to get it there so it left and came here.  Patient initially had cough, congestion, fever, was placed on antibiotics on Sunday.  Started feeling nauseous and had a presyncopal event after which he presented to ED last night.  Had chest pain as well.  Chest pain is midsternal.  Not currently having chest pain.  Received 4 aspirin 30 min pta. (01 Nov 2023 15:11)      Patient seen and evaluated at bedside. interval events noted    Interval HPI:   no acute events o/n     PAST MEDICAL & SURGICAL HISTORY:  HTN (hypertension)      CAD (coronary artery disease)  2009; stent      Intracranial hemorrhage  2008      Respiratory arrest  december 1st      Myocardial infarction, unspecified MI type, unspecified artery      History of coronary artery stent placement          REVIEW OF SYSTEMS:  as per hpi     VITALS:   Vital Signs Last 24 Hrs  T(C): 36.3 (09 Jan 2024 08:48), Max: 36.8 (09 Jan 2024 06:10)  T(F): 97.4 (09 Jan 2024 08:48), Max: 98.2 (09 Jan 2024 06:10)  HR: 92 (09 Jan 2024 09:20) (88 - 98)  BP: 104/63 (09 Jan 2024 09:20) (95/60 - 131/70)  BP(mean): 77 (09 Jan 2024 06:10) (72 - 82)  RR: 18 (09 Jan 2024 09:20) (16 - 20)  SpO2: 96% (09 Jan 2024 09:20) (94% - 97%)    Parameters below as of 09 Jan 2024 09:20  Patient On (Oxygen Delivery Method): room air         PHYSICAL EXAM:  GENERAL: NAD, comfortable.    HEAD:  Atraumatic, Normocephalic  EYES: EOMI, PERRLA, conjunctiva and sclera clear  NECK:  No JVD.    CHEST/LUNG: Clear to auscultation bilaterally; No wheeze    HEART: Regular rate and rhythm; No murmurs, rubs, or gallops  ABDOMEN: Soft, Nontender, Nondistended; Bowel sounds present  EXTREMITIES:  2+ Peripheral Pulses, No clubbing, cyanosis, or edema  NEUROLOGY: a0x3          Consultant(s) Notes Reviewed:  [x ] YES  [ ] NO  Care Discussed with Consultants/Other Providers [ x] YES  [ ] NO    MEDS:   MEDICATIONS  (STANDING):  budesonide  80 MICROgram(s)/formoterol 4.5 MICROgram(s) Inhaler 1 Puff(s) Inhalation two times a day  chlorhexidine 4% Liquid 1 Application(s) Topical <User Schedule>  dextrose 5%. 1000 milliLiter(s) (100 mL/Hr) IV Continuous <Continuous>  dextrose 5%. 1000 milliLiter(s) (50 mL/Hr) IV Continuous <Continuous>  dextrose 50% Injectable 50 milliLiter(s) IV Push every 15 minutes  fluconAZOLE   Tablet 100 milliGRAM(s) Oral <User Schedule>  glucagon  Injectable 1 milliGRAM(s) IntraMuscular once  hydrALAZINE 25 milliGRAM(s) Oral every 8 hours  insulin glargine Injectable (LANTUS) 33 Unit(s) SubCutaneous at bedtime  insulin lispro (ADMELOG) corrective regimen sliding scale   SubCutaneous three times a day before meals  insulin lispro (ADMELOG) corrective regimen sliding scale   SubCutaneous <User Schedule>  insulin lispro Injectable (ADMELOG) 20 Unit(s) SubCutaneous before breakfast  insulin lispro Injectable (ADMELOG) 17 Unit(s) SubCutaneous before dinner  insulin lispro Injectable (ADMELOG) 17 Unit(s) SubCutaneous before lunch  insulin regular Infusion 3 Unit(s)/Hr (3 mL/Hr) IV Continuous <Continuous>  lidocaine   4% Patch 3 Patch Transdermal daily  magnesium oxide 400 milliGRAM(s) Oral three times a day with meals  melatonin 5 milliGRAM(s) Oral at bedtime  mycophenolate mofetil 1000 milliGRAM(s) Oral every 12 hours  NIFEdipine XL 60 milliGRAM(s) Oral every 12 hours  OLANZapine 5 milliGRAM(s) Oral at bedtime  pantoprazole    Tablet 40 milliGRAM(s) Oral every 24 hours  polyethylene glycol 3350 17 Gram(s) Oral daily  predniSONE   Tablet 15 milliGRAM(s) Oral every 12 hours  senna 2 Tablet(s) Oral at bedtime  tacrolimus 6 milliGRAM(s) Oral <User Schedule>  trimethoprim   80 mG/sulfamethoxazole 400 mG 1 Tablet(s) Oral daily  valGANciclovir 450 milliGRAM(s) Oral every 12 hours    MEDICATIONS  (PRN):  acetaminophen     Tablet .. 650 milliGRAM(s) Oral every 6 hours PRN Mild pain  dextrose Oral Gel 15 Gram(s) Oral once PRN Blood Glucose LESS THAN 70 milliGRAM(s)/deciliter        Allergies    penicillins (Unknown)    Intolerances        LABS:                                                                                   9.8    7.99  )-----------( 200      ( 09 Jan 2024 08:39 )             29.9   01-09    132<L>  |  94<L>  |  44<H>  ----------------------------<  313<H>  4.6   |  23  |  1.17    Ca    9.5      09 Jan 2024 08:39  Phos  2.1     01-09  Mg     2.1     01-09    TPro  5.9<L>  /  Alb  3.8  /  TBili  0.2  /  DBili  x   /  AST  42<H>  /  ALT  68<H>  /  AlkPhos  58  01-09      < from: CT Chest No Cont (01.05.24 @ 09:25) >  IMPRESSION:  Question mild stercoral colitis.  Expected postoperative changes in the chest. No fluid collection.        --- End of Report ---    < end of copied text >      < from: CT Abdomen and Pelvis No Cont (11.28.23 @ 03:38) >  IMPRESSION:  Mildly dilated colon and prominent but not overly dilated small bowel   with air-fluid levels. No discrete transition point. Findings are   suggestive of ileus.    Intra-aortic balloon pump with the inferior marker at the level of the   inferior mesenteric artery and the balloon overlying the origins of the   renal arteries, celiac artery, and superior mesenteric artery. Consider   repositioning. Concern for IABP positioning was discussed with LANETTE Hawthorne   on 11/28/2023 at 3:42 AM by Dr. Shepard.    < end of copied text >          < from: Colonoscopy (11.17.23 @ 10:35) >                                                                                                        Impression:          - The entire examined colon is normal on direct and retroflexion views.                       - No specimens collected.  Recommendation:      - Resume previous diet today.                       - No large polyps or masses detected, No objection from GI standpoint to                 proceed with heart transplantation/advanced heart therapies.                       - Please call back should any further questions or concerns arise.    < end of copied text >     < from: Xray Chest 1 View- PORTABLE-Urgent (11.01.23 @ 07:42) >    IMPRESSION:  Clear lungs.    ---End of Report ---        < end of copied text >  < from: TTE W or WO Ultrasound Enhancing Agent (11.01.23 @ 10:23) >  _____________________________     CONCLUSIONS:      1. Left ventricular cavity is moderately dilated. Left ventricular wall thickness is normal. Left ventricular systolic function is severely decreased with an ejection fraction of 32 % by Chinchilla's method of disks. Regional wall motion abnormalities present.   2. Multiple segmental abnormalities exist. See findings.   3. There is moderate (grade 2) left ventricular diastolic dysfunction, with indeterminant filling pressure.   4. Normal right ventricular cavity size, wall thickness, and systolic function.   5. No significant valvular disease.   6. No pericardial effusion seen.   7. Compared to the transthoracic echocardiogram performed on 1/25/2017 the areas of akinesis are unchanged but there has been a decline in LV systolic function with new areas of hypokinesis.    __________________________________________________________________    < end of copied text >  < from: TTE Limited W or WO Ultrasound Enhancing Agent (11.02.23 @ 07:41) >  __________________________     CONCLUSIONS:      1. After obtaining consent, Definity ultrasound enhancing agent was given for enhanced left ventricular opacification and improved delineation of the left ventricular endocardial borders. Left ventricular systolic function is severely decreased with a calculated ejection fraction of 22 % by the Chinchilla's biplane method of disks. There is a left ventricular thrombus.   2. Findings were discussed with Litzy BOSS on 11/2/2023 at 8.49am.   3. There is a left ventricular thrombus.    _________________________________________________________________    < end of copied text >   [id number] Comprehensive

## 2024-07-09 NOTE — ED ADULT NURSE NOTE - DISCHARGE DATE/TIME
Problem: Pain  Goal: Takes deep breaths with improved pain control throughout the shift  Outcome: Progressing  Goal: Turns in bed with improved pain control throughout the shift  Outcome: Progressing  Goal: Walks with improved pain control throughout the shift  Outcome: Progressing  Goal: Performs ADL's with improved pain control throughout shift  Outcome: Progressing  Goal: Participates in PT with improved pain control throughout the shift  Outcome: Progressing  Goal: Free from opioid side effects throughout the shift  Outcome: Progressing  Goal: Free from acute confusion related to pain meds throughout the shift  Outcome: Progressing       
 The clinical goals for the shift include pt will remain safe, and free of injury during my shift    Over the shift, the patient seemed to have better pain control.      Problem: Pain  Goal: Turns in bed with improved pain control throughout the shift  Outcome: Progressing     Problem: Pain  Goal: Free from opioid side effects throughout the shift  Outcome: Progressing     Problem: Pain  Goal: Free from acute confusion related to pain meds throughout the shift  Outcome: Progressing     Problem: Skin  Goal: Decreased wound size/increased tissue granulation at next dressing change  Outcome: Progressing  Flowsheets (Taken 7/7/2024 1616)  Decreased wound size/increased tissue granulation at next dressing change: Promote sleep for wound healing     Problem: Skin  Goal: Prevent/manage excess moisture  Outcome: Progressing  Flowsheets (Taken 7/7/2024 1616)  Prevent/manage excess moisture:   Moisturize dry skin   Monitor for/manage infection if present     Problem: Skin  Goal: Prevent/minimize sheer/friction injuries  Outcome: Progressing  Flowsheets (Taken 7/7/2024 1616)  Prevent/minimize sheer/friction injuries:   Increase activity/out of bed for meals   Use pull sheet     
The clinical goals for the shift include pt will have a BM during my shift      Problem: Pain  Goal: Takes deep breaths with improved pain control throughout the shift  Outcome: Progressing     Problem: Pain  Goal: Turns in bed with improved pain control throughout the shift  Outcome: Progressing     Problem: Pain  Goal: Free from opioid side effects throughout the shift  Outcome: Progressing     
The patient's goals for the shift include      The clinical goals for the shift include Patient will remain HDS throughout the shift 7/6/24 @ 0700      Problem: Pain  Goal: Takes deep breaths with improved pain control throughout the shift  Outcome: Progressing  Goal: Turns in bed with improved pain control throughout the shift  Outcome: Progressing  Goal: Walks with improved pain control throughout the shift  Outcome: Progressing  Goal: Performs ADL's with improved pain control throughout shift  Outcome: Progressing  Goal: Participates in PT with improved pain control throughout the shift  Outcome: Progressing  Goal: Free from opioid side effects throughout the shift  Outcome: Progressing  Goal: Free from acute confusion related to pain meds throughout the shift  Outcome: Progressing     Problem: Skin  Goal: Decreased wound size/increased tissue granulation at next dressing change  Outcome: Progressing  Flowsheets (Taken 7/6/2024 0452)  Decreased wound size/increased tissue granulation at next dressing change: Promote sleep for wound healing  Goal: Participates in plan/prevention/treatment measures  Outcome: Progressing  Flowsheets (Taken 7/6/2024 0452)  Participates in plan/prevention/treatment measures: Elevate heels  Goal: Prevent/manage excess moisture  Outcome: Progressing  Goal: Prevent/minimize sheer/friction injuries  Outcome: Progressing  Goal: Promote/optimize nutrition  Outcome: Progressing  Goal: Promote skin healing  Outcome: Progressing       
The patient's goals for the shift include      The clinical goals for the shift include Patient will report <7 pain level by the end of the shift 7/8/24 @ 0700      Problem: Pain  Goal: Takes deep breaths with improved pain control throughout the shift  Outcome: Progressing  Goal: Turns in bed with improved pain control throughout the shift  Outcome: Progressing  Goal: Walks with improved pain control throughout the shift  Outcome: Progressing  Goal: Performs ADL's with improved pain control throughout shift  Outcome: Progressing  Goal: Participates in PT with improved pain control throughout the shift  Outcome: Progressing  Goal: Free from opioid side effects throughout the shift  Outcome: Progressing  Goal: Free from acute confusion related to pain meds throughout the shift  Outcome: Progressing     Problem: Skin  Goal: Decreased wound size/increased tissue granulation at next dressing change  Outcome: Progressing  Flowsheets (Taken 7/8/2024 0155)  Decreased wound size/increased tissue granulation at next dressing change: Promote sleep for wound healing  Goal: Participates in plan/prevention/treatment measures  Outcome: Progressing  Flowsheets (Taken 7/8/2024 0155)  Participates in plan/prevention/treatment measures: Elevate heels  Goal: Prevent/manage excess moisture  Outcome: Progressing  Flowsheets (Taken 7/8/2024 0155)  Prevent/manage excess moisture: Moisturize dry skin  Goal: Prevent/minimize sheer/friction injuries  Outcome: Progressing  Flowsheets (Taken 7/8/2024 0155)  Prevent/minimize sheer/friction injuries: Use pull sheet  Goal: Promote/optimize nutrition  Outcome: Progressing  Goal: Promote skin healing  Outcome: Progressing       
The patient's goals for the shift include      The clinical goals for the shift include Patient will report decrease pain level <8 by the end of the shift 7/7/24 @ 0700      Problem: Pain  Goal: Takes deep breaths with improved pain control throughout the shift  Outcome: Progressing  Goal: Turns in bed with improved pain control throughout the shift  Outcome: Progressing  Goal: Walks with improved pain control throughout the shift  Outcome: Progressing  Goal: Performs ADL's with improved pain control throughout shift  Outcome: Progressing  Goal: Participates in PT with improved pain control throughout the shift  Outcome: Progressing  Goal: Free from opioid side effects throughout the shift  Outcome: Progressing  Goal: Free from acute confusion related to pain meds throughout the shift  Outcome: Progressing     Problem: Skin  Goal: Decreased wound size/increased tissue granulation at next dressing change  Outcome: Progressing  Flowsheets (Taken 7/7/2024 0244)  Decreased wound size/increased tissue granulation at next dressing change: Promote sleep for wound healing  Goal: Participates in plan/prevention/treatment measures  Outcome: Progressing  Goal: Prevent/manage excess moisture  Outcome: Progressing  Flowsheets (Taken 7/7/2024 0244)  Prevent/manage excess moisture: Moisturize dry skin  Goal: Prevent/minimize sheer/friction injuries  Outcome: Progressing  Goal: Promote/optimize nutrition  Outcome: Progressing  Goal: Promote skin healing  Outcome: Progressing       
18-May-2019 18:18

## 2025-02-26 NOTE — H&P ADULT - REASON FOR ADMISSION
Discharge Instructions for  [facility]  [address] [place] [address]  Telephone: [phone]   FAX [phone]    NAME:  James Wray  YOB: 1958  ACCOUNT NUMBER :  [id number]  DATE:  2/26/2025    Referral to [facility] [phone]  for Low Air loss bed electrical, and Erinn [phone] for Electric wheel chair evaluation and pressure mapping.      :MONSTER CASTANON RN     [facility]     Wound Cleansing:   Do not scrub or use excessive force.  Cleanse wound prior to applying a clean dressing with:      [x] Cleanse wound with: Vashe and then rinse off with Normal Saline (No wound cleanser)    [] May Shower at Discharge     [] Shower on days of dressing change, remove dressing first    [] Keep Wound Dry in Shower (may purchase a cast cover if needed)        Dressings:           Wound Location : Sacral Wound   Apply Primary Dressing:        [] MediHoney Gel    [] MediHoney Alginate  [] Alginate     [x] Alginate with Silver to small open area, once closed may stop using      [] Collagen   [] Collagen with Silver     [] Hydrocolloid  [] Hydrofera Blue Classic (moisten with saline)  [] Hydrofera Blue Ready  [x] Other: Desitin PRN on any wet areas     [] Pack wound loosely with      Cover and Secure with:    [x] Gauze   [] Sb   [] Kerlix  [] Ace Wrap     [] ABD  [] Medipore tape  [] tape  [x] Other: Sacral Foam no boarder   Avoid contact of tape with skin.    Change dressing:   [] Daily    [] Every Other Day   [x] Three times per week or as needed if wet or soiled   [] Once a week   [] Do Not Change Dressing     [] Other:    Dressings:           Wound Location : Right 3rd toe wound (Healed)        Pressure Relief:  [x] When sitting, shift position or do seat lifts every 15 minutes.  [x] Wheelchair cushion   [x] Specialty Bed/Mattress  [x] Turn every 2 hours when in bed.  Avoid position directing pressure on wound site.  Limit side  weakness

## 2025-05-02 NOTE — H&P ADULT - NSTOBACCOSCREENHP_GEN_A_NCS
Patient is in the office requesting a refill for effexor, pantoprazole, allegra, oxcarbazepine to be sent to [facility].  
No

## 2025-07-14 NOTE — ED ADULT TRIAGE NOTE - ESI TRIAGE ACUITY LEVEL, MLM
2 General Sunscreen Counseling: I recommended a broad spectrum sunscreen with a SPF of 30 or higher.  I explained that SPF 30 sunscreens block approximately 97 percent of the sun's harmful rays.  Sunscreens should be applied at least 15 minutes prior to expected sun exposure and then every 2 hours after that as long as sun exposure continues. If swimming or exercising sunscreen should be reapplied every 45 minutes to an hour after getting wet or sweating.  One ounce, or the equivalent of a shot glass full of sunscreen, is adequate to protect the skin not covered by a bathing suit. I also recommended a lip balm with a sunscreen as well. Sun protective clothing can be used in lieu of sunscreen but must be worn the entire time you are exposed to the sun's rays. Detail Level: Detailed